# Patient Record
Sex: FEMALE | Race: WHITE | NOT HISPANIC OR LATINO | Employment: FULL TIME | URBAN - METROPOLITAN AREA
[De-identification: names, ages, dates, MRNs, and addresses within clinical notes are randomized per-mention and may not be internally consistent; named-entity substitution may affect disease eponyms.]

---

## 2017-01-07 ENCOUNTER — TELEPHONE (OUTPATIENT)
Dept: NURSING | Facility: CLINIC | Age: 62
End: 2017-01-07

## 2017-01-07 ENCOUNTER — OFFICE VISIT (OUTPATIENT)
Dept: URGENT CARE | Facility: URGENT CARE | Age: 62
End: 2017-01-07
Payer: COMMERCIAL

## 2017-01-07 VITALS
WEIGHT: 120 LBS | TEMPERATURE: 97.6 F | SYSTOLIC BLOOD PRESSURE: 120 MMHG | RESPIRATION RATE: 18 BRPM | HEIGHT: 61 IN | HEART RATE: 76 BPM | DIASTOLIC BLOOD PRESSURE: 78 MMHG | BODY MASS INDEX: 22.66 KG/M2

## 2017-01-07 DIAGNOSIS — H01.003 BLEPHARITIS OF BOTH EYES, UNSPECIFIED EYELID, UNSPECIFIED TYPE: Primary | ICD-10-CM

## 2017-01-07 DIAGNOSIS — H01.006 BLEPHARITIS OF BOTH EYES, UNSPECIFIED EYELID, UNSPECIFIED TYPE: Primary | ICD-10-CM

## 2017-01-07 PROCEDURE — 99213 OFFICE O/P EST LOW 20 MIN: CPT | Performed by: FAMILY MEDICINE

## 2017-01-07 RX ORDER — TOBRAMYCIN AND DEXAMETHASONE 3; 1 MG/ML; MG/ML
SUSPENSION/ DROPS OPHTHALMIC
Qty: 1 BOTTLE | Refills: 0 | Status: SHIPPED | OUTPATIENT
Start: 2017-01-07 | End: 2017-01-11 | Stop reason: ALTCHOICE

## 2017-01-07 NOTE — NURSING NOTE
"Chief Complaint   Patient presents with     Urgent Care     Eye Problem       Initial /78 mmHg  Pulse 76  Temp(Src) 97.6  F (36.4  C) (Oral)  Resp 18  Ht 5' 1\" (1.549 m)  Wt 120 lb (54.432 kg)  BMI 22.69 kg/m2  LMP 06/03/2010 Estimated body mass index is 22.69 kg/(m^2) as calculated from the following:    Height as of this encounter: 5' 1\" (1.549 m).    Weight as of this encounter: 120 lb (54.432 kg).  BP completed using cuff size: regular    Magda Allen  CMA      "

## 2017-01-07 NOTE — PROGRESS NOTES
"  SUBJECTIVE:                                                    Edwina Zhou is a 61 year old female who presents to clinic today for the following health issues:      Eye(s) Problem      Duration: one month on and off today has been the worst    Description:  Location: both, right is more bothersome, eyelids are itchy  Pain: YES- to the touch  Redness: YES  Discharge: YES    Accompanying signs and symptoms:     History (Trauma, foreign body exposure,): None    Precipitating or alleviating factors (contact use): None    Therapies tried and outcome: None     OBJECTIVE:  /78 mmHg  Pulse 76  Temp(Src) 97.6  F (36.4  C) (Oral)  Resp 18  Ht 5' 1\" (1.549 m)  Wt 120 lb (54.432 kg)  BMI 22.69 kg/m2  LMP 06/03/2010  Exam; bbilateral upper eyelid swelling.  Right upper lid has eczematous appearance.   Conjunctivae are normal. Remainder of eye exam was also normal    Remainder of ENT exam was within normal limits; heart and lungs were clear  Gen.; no apparent distress    ASSESSMENT:  1. Blepharitis  2. Dermatitis    PLAN:  1. tobramycin-dexamethasone (TOBRADEX) 0.3-0.1 % ophthalmic susp      "

## 2017-01-07 NOTE — TELEPHONE ENCOUNTER
"Call Type: Triage Call    Presenting Problem: \"I woke up and my eyelids are funky\".  Caller is  reporting that both eye lids are swollen and red.  Triage Note:  Guideline Title: Eye: Infection or Irritation  Recommended Disposition: See Provider within 24 hours  Original Inclination: Wanted to speak with a nurse  Override Disposition:  Intended Action: Go to Urgent Care Center  Physician Contacted: No  Generalized swelling of eyelid(s) and unresponsive to self care ?  YES  Eye injury, possible foreign body, OR chemical splash ? NO  Known herpes zoster or undiagnosed blister-like rash on or near eye or on tip of  nose ? NO  Sudden or severe eye pain ? NO  Sudden decrease in visual acuity ? NO  New onset of eye redness, irritation/foreign body sensation or gritty feeling with  yellow/green drainage ? NO  New onset of eye redness, irritation/foreign body sensation or gritty feeling with  watery or sticky mucus drainage ? NO  Worsening redness, swelling AND tenderness of tissue around eyes associated with  restricted or painful eye movements, bulging eye, or decreased vision related to  swelling ? NO  Pressure/pain above or below eyes, near ears over sinuses with associated  tenderness, swelling or redness of soft tissue. ? NO  New onset of severe sensitivity to light or glare (photophobia) associated with  eye pain, reduced vision, tearing, or discharge. ? NO  Physician Instructions:  Care Advice: Another adult should drive if vision is impaired.  Avoid touching or rubbing the eyes  wash hands frequently and do not share towels, washcloths or other personal  care items with others.  Apply a warm compress to the eye(s) four times a day for 10 to 15 minutes.  Gently brush closed eyelid(s) with a cotton ball and diluted baby shampoo  and water (3-5 drops of shampoo in 4 oz. of water) three or four times a  day.  Don't use eye makeup or wear contact lenses until there have been no  symptoms for at least 24 hours.  Discard " mascara, eye liner, and eye shadow as they can be a source of  infection.  To avoid eye infections, discard these eye makeups every 3 to 4  months. Do not share eye cosmetics.

## 2017-01-11 ENCOUNTER — TELEPHONE (OUTPATIENT)
Dept: FAMILY MEDICINE | Facility: CLINIC | Age: 62
End: 2017-01-11

## 2017-01-11 ENCOUNTER — OFFICE VISIT (OUTPATIENT)
Dept: FAMILY MEDICINE | Facility: CLINIC | Age: 62
End: 2017-01-11
Payer: COMMERCIAL

## 2017-01-11 VITALS
WEIGHT: 125 LBS | DIASTOLIC BLOOD PRESSURE: 64 MMHG | BODY MASS INDEX: 23.6 KG/M2 | OXYGEN SATURATION: 99 % | HEART RATE: 81 BPM | TEMPERATURE: 98.3 F | SYSTOLIC BLOOD PRESSURE: 104 MMHG | HEIGHT: 61 IN

## 2017-01-11 DIAGNOSIS — H01.001 BLEPHARITIS OF RIGHT UPPER EYELID, UNSPECIFIED TYPE: Primary | ICD-10-CM

## 2017-01-11 DIAGNOSIS — H01.9 DERMATITIS OF EYELID OF RIGHT EYE: ICD-10-CM

## 2017-01-11 PROCEDURE — 99213 OFFICE O/P EST LOW 20 MIN: CPT | Performed by: FAMILY MEDICINE

## 2017-01-11 RX ORDER — HYDROCORTISONE 2.5 %
CREAM (GRAM) TOPICAL
Qty: 20 G | Refills: 3 | Status: SHIPPED | OUTPATIENT
Start: 2017-01-11 | End: 2021-06-11

## 2017-01-11 NOTE — MR AVS SNAPSHOT
After Visit Summary   1/11/2017    Edwina Zhou    MRN: [id number]           Patient Information     Date Of Birth          1955        Visit Information        Provider Department      1/11/2017 2:45 PM Phuong Story MD [facility]        Today's Diagnoses     Blepharitis of right upper eyelid, unspecified type    -  1       Care Instructions    Stop the tobradex drops.  Use the hydrocortisone cream very scantly across the affected lid - up to twice daily.   Please, call or return to clinic or go to the ER immediately if signs or symptoms worsen or fail to improve as anticipated.     Ok for slight smear vaseline during the day , if needed across the eyelids to help with moisture and healing of that skin split.     Ok for cold compresses.  Oral claritin 10mg daily for the itching.  Try not to rub.                 Thank you for choosing [facility]  for your Health Care. It was a pleasure seeing you at your visit today. Please contact us with any questions or concerns you may have.                   Phuong Story MD                                  To reach your [facility] care team after hours call:   [phone]    Our clinic hours are:     Monday- 7:30 am - 7:00 pm                             Tuesday through Friday- 7:30 am - 5:00 pm                                        Saturday- 8:00 am - 12:00 pm                  Phone:  [phone]    Our pharmacy hours are:     Monday  8:00 am to 7:00 pm      Tuesday through Friday 8:00am to 6:00pm                        Saturday - 9:00 am to 1:00 pm      Sunday : Closed.              Phone:  [phone]      There is also information available at our web site:  [email or web address]    If your provider ordered any lab tests and you do not receive the results within 10 business days, please call the clinic.    If you need a medication refill please contact your pharmacy.  Please allow 2  "business days for your refill to be completed.    Our clinic offers telephone visits and e visits.  Please ask one of your team members to explain more.      Use NovaSyst (secure email communication and access to your chart) to send your primary care provider a message or make an appointment. Ask someone on your Team how to sign up for MyChart.                     Follow-ups after your visit        Who to contact     If you have questions or need follow up information about today's clinic visit or your schedule please contact [facility] directly at [phone].  Normal or non-critical lab and imaging results will be communicated to you by MyChart, letter or phone within 4 business days after the clinic has received the results. If you do not hear from us within 7 days, please contact the clinic through FatTailhart or phone. If you have a critical or abnormal lab result, we will notify you by phone as soon as possible.  Submit refill requests through Socialbomb or call your pharmacy and they will forward the refill request to us. Please allow 3 business days for your refill to be completed.          Additional Information About Your Visit        FatTailhart Information     NovaSyst gives you secure access to your electronic health record. If you see a primary care provider, you can also send messages to your care team and make appointments. If you have questions, please call your primary care clinic.  If you do not have a primary care provider, please call [phone] and they will assist you.        Care EveryWhere ID     This is your Care EveryWhere ID. This could be used by other organizations to access your [place] medical records  [id number]        Your Vitals Were     Pulse Temperature Height BMI (Body Mass Index) Pulse Oximetry Last Period    81 98.3  F (36.8  C) (Oral) 5' 1\" (1.549 m) 23.63 kg/m2 99% 06/03/2010       Blood Pressure from Last 3 Encounters:   01/11/17 104/64   01/07/17 120/78 "   06/13/16 102/62    Weight from Last 3 Encounters:   01/11/17 125 lb (56.7 kg)   01/07/17 120 lb (54.432 kg)   06/13/16 120 lb 2 oz (54.488 kg)              Today, you had the following     No orders found for display         Today's Medication Changes          These changes are accurate as of: 1/11/17  3:21 PM.  If you have any questions, ask your nurse or doctor.               Start taking these medicines.        Dose/Directions    hydrocortisone 2.5 % cream   Used for:  Blepharitis of right upper eyelid, unspecified type   Started by:  Phuong Story MD        Apply very small amount to eyelids up to twice daily   Quantity:  20 g   Refills:  3            Where to get your medicines      These medications were sent to [place] [address] [place] [address] [address]    Hours:  Tech issues with their phone system Phone:  [phone]    - hydrocortisone 2.5 % cream             Primary Care Provider Office Phone # Fax #    Phuong Story [phone]       [facility] [address] [place] [id number]        Thank you!     Thank you for choosing [facility]  for your care. Our goal is always to provide you with excellent care. Hearing back from our patients is one way we can continue to improve our services. Please take a few minutes to complete the written survey that you may receive in the mail after your visit with us. Thank you!             Your Updated Medication List - Protect others around you: Learn how to safely use, store and throw away your medicines at www.disposemymeds.org.          This list is accurate as of: 1/11/17  3:21 PM.  Always use your most recent med list.                   Brand Name Dispense Instructions for use    calcium carb 1250 mg (500 mg [place])/vitamin D 200 units 500-200 MG-UNIT per tablet    OSCAL with D    100 tablet    Take 1 tablet by mouth 2 times daily  (with meals).       hydrocortisone 2.5 % cream     20 g    Apply very small amount to eyelids up to twice daily       loratadine 10 MG tablet    CLARITIN    3 MONTHS    ONE DAILY       multivitamin per tablet     100 tablet    Take 1 tablet by mouth daily.       omega 3 1000 MG Caps     90 Cap    once daily       tobramycin-dexamethasone 0.3-0.1 % ophthalmic susp    TOBRADEX    1 Bottle    1 drop in each eye tid x 7 days       vitamin B complex with vitamin C Tabs tablet    STRESS TAB    100 tablet    Take 1 tablet by mouth daily.

## 2017-01-11 NOTE — NURSING NOTE
"Chief Complaint   Patient presents with     Eye Problem     intermittent x 1 month      Initial /64 mmHg  Pulse 81  Temp(Src) 98.3  F (36.8  C) (Oral)  Ht 5' 1\" (1.549 m)  Wt 125 lb (56.7 kg)  BMI 23.63 kg/m2  SpO2 99%  LMP 06/03/2010 Estimated body mass index is 23.63 kg/(m^2) as calculated from the following:    Height as of this encounter: 5' 1\" (1.549 m).    Weight as of this encounter: 125 lb (56.7 kg).  BP completed using cuff size regular right Arm  Rylee Auuth CMA    "
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
## 2017-01-11 NOTE — PATIENT INSTRUCTIONS
Stop the tobradex drops.  Use the hydrocortisone cream very scantly across the affected lid - up to twice daily.   Please, call or return to clinic or go to the ER immediately if signs or symptoms worsen or fail to improve as anticipated.     Ok for slight smear vaseline during the day , if needed across the eyelids to help with moisture and healing of that skin split.     Ok for cold compresses.  Oral claritin 10mg daily for the itching.  Try not to rub.                 Thank you for choosing [facility]  for your Health Care. It was a pleasure seeing you at your visit today. Please contact us with any questions or concerns you may have.                   Phuong Story MD                                  To reach your [facility] care team after hours call:   [phone]    Our clinic hours are:     Monday- 7:30 am - 7:00 pm                             Tuesday through Friday- 7:30 am - 5:00 pm                                        Saturday- 8:00 am - 12:00 pm                  Phone:  [phone]    Our pharmacy hours are:     Monday  8:00 am to 7:00 pm      Tuesday through Friday 8:00am to 6:00pm                        Saturday - 9:00 am to 1:00 pm      Sunday : Closed.              Phone:  [phone]      There is also information available at our web site:  [email or web address]    If your provider ordered any lab tests and you do not receive the results within 10 business days, please call the clinic.    If you need a medication refill please contact your pharmacy.  Please allow 2 business days for your refill to be completed.    Our clinic offers telephone visits and e visits.  Please ask one of your team members to explain more.      Use Magic Wheel[employer] (secure email communication and access to your chart) to send your primary care provider a message or make an appointment. Ask someone on your Team how to sign up for [employer].

## 2017-01-11 NOTE — TELEPHONE ENCOUNTER
[facility] note 1/7/17    Eye(s) Problem        Duration: one month on and off today has been the worst    Description:  Location: both, right is more bothersome, eyelids are itchy  Pain: YES- to the touch  Redness: YES  Discharge: YES    Accompanying signs and symptoms:      History (Trauma, foreign body exposure,): None    Precipitating or alleviating factors (contact use): None    Therapies tried and outcome: None               Now there is a bump or dermatitis on eye lid on the rt.       Appt made for today.    The patient indicates understanding of these issues and agrees with the plan.    Gris Santos RN    [facility]

## 2017-07-15 ENCOUNTER — HEALTH MAINTENANCE LETTER (OUTPATIENT)
Age: 62
End: 2017-07-15

## 2017-09-18 ENCOUNTER — OFFICE VISIT (OUTPATIENT)
Dept: FAMILY MEDICINE | Facility: CLINIC | Age: 62
End: 2017-09-18
Payer: COMMERCIAL

## 2017-09-18 VITALS
TEMPERATURE: 97.9 F | BODY MASS INDEX: 22.96 KG/M2 | WEIGHT: 121.6 LBS | HEART RATE: 57 BPM | SYSTOLIC BLOOD PRESSURE: 102 MMHG | DIASTOLIC BLOOD PRESSURE: 70 MMHG | HEIGHT: 61 IN | OXYGEN SATURATION: 100 %

## 2017-09-18 DIAGNOSIS — Z12.31 VISIT FOR SCREENING MAMMOGRAM: ICD-10-CM

## 2017-09-18 DIAGNOSIS — Z12.4 SCREENING FOR MALIGNANT NEOPLASM OF CERVIX: ICD-10-CM

## 2017-09-18 DIAGNOSIS — Z12.11 SCREEN FOR COLON CANCER: ICD-10-CM

## 2017-09-18 DIAGNOSIS — Z23 NEED FOR PROPHYLACTIC VACCINATION AND INOCULATION AGAINST INFLUENZA: ICD-10-CM

## 2017-09-18 DIAGNOSIS — K64.4 EXTERNAL HEMORRHOIDS: ICD-10-CM

## 2017-09-18 DIAGNOSIS — Z00.01 ENCOUNTER FOR ROUTINE ADULT MEDICAL EXAM WITH ABNORMAL FINDINGS: Primary | ICD-10-CM

## 2017-09-18 DIAGNOSIS — M75.81 RIGHT ROTATOR CUFF TENDONITIS: ICD-10-CM

## 2017-09-18 DIAGNOSIS — E78.5 HYPERLIPIDEMIA WITH TARGET LDL LESS THAN 130: ICD-10-CM

## 2017-09-18 DIAGNOSIS — E55.9 VITAMIN D DEFICIENCY: ICD-10-CM

## 2017-09-18 DIAGNOSIS — K90.41 GLUTEN INTOLERANCE: ICD-10-CM

## 2017-09-18 DIAGNOSIS — Z78.0 POSTMENOPAUSAL: ICD-10-CM

## 2017-09-18 PROBLEM — Z86.39 HISTORY OF OBESITY: Status: ACTIVE | Noted: 2017-09-18

## 2017-09-18 LAB
ALBUMIN SERPL-MCNC: 3.7 G/DL (ref 3.4–5)
ALP SERPL-CCNC: 50 U/L (ref 40–150)
ALT SERPL W P-5'-P-CCNC: 45 U/L (ref 0–50)
ANION GAP SERPL CALCULATED.3IONS-SCNC: 7 MMOL/L (ref 3–14)
AST SERPL W P-5'-P-CCNC: 23 U/L (ref 0–45)
BILIRUB SERPL-MCNC: 0.4 MG/DL (ref 0.2–1.3)
BUN SERPL-MCNC: 16 MG/DL (ref 7–30)
CALCIUM SERPL-MCNC: 9 MG/DL (ref 8.5–10.1)
CHLORIDE SERPL-SCNC: 107 MMOL/L (ref 94–109)
CHOLEST SERPL-MCNC: 241 MG/DL
CO2 SERPL-SCNC: 27 MMOL/L (ref 20–32)
CREAT SERPL-MCNC: 0.67 MG/DL (ref 0.52–1.04)
ERYTHROCYTE [DISTWIDTH] IN BLOOD BY AUTOMATED COUNT: 12.1 % (ref 10–15)
GFR SERPL CREATININE-BSD FRML MDRD: 90 ML/MIN/1.7M2
GLUCOSE SERPL-MCNC: 90 MG/DL (ref 70–99)
HCT VFR BLD AUTO: 40.3 % (ref 35–47)
HDLC SERPL-MCNC: 54 MG/DL
HGB BLD-MCNC: 13.7 G/DL (ref 11.7–15.7)
LDLC SERPL CALC-MCNC: 169 MG/DL
MCH RBC QN AUTO: 31.9 PG (ref 26.5–33)
MCHC RBC AUTO-ENTMCNC: 34 G/DL (ref 31.5–36.5)
MCV RBC AUTO: 94 FL (ref 78–100)
NONHDLC SERPL-MCNC: 187 MG/DL
PLATELET # BLD AUTO: 276 10E9/L (ref 150–450)
POTASSIUM SERPL-SCNC: 4.3 MMOL/L (ref 3.4–5.3)
PROT SERPL-MCNC: 7.3 G/DL (ref 6.8–8.8)
RBC # BLD AUTO: 4.29 10E12/L (ref 3.8–5.2)
SODIUM SERPL-SCNC: 141 MMOL/L (ref 133–144)
TRIGL SERPL-MCNC: 88 MG/DL
TSH SERPL DL<=0.005 MIU/L-ACNC: 2.9 MU/L (ref 0.4–4)
WBC # BLD AUTO: 6.3 10E9/L (ref 4–11)

## 2017-09-18 PROCEDURE — 82306 VITAMIN D 25 HYDROXY: CPT | Mod: 59 | Performed by: FAMILY MEDICINE

## 2017-09-18 PROCEDURE — 87624 HPV HI-RISK TYP POOLED RSLT: CPT | Performed by: FAMILY MEDICINE

## 2017-09-18 PROCEDURE — 90686 IIV4 VACC NO PRSV 0.5 ML IM: CPT | Performed by: FAMILY MEDICINE

## 2017-09-18 PROCEDURE — 80061 LIPID PANEL: CPT | Performed by: FAMILY MEDICINE

## 2017-09-18 PROCEDURE — 80053 COMPREHEN METABOLIC PANEL: CPT | Performed by: FAMILY MEDICINE

## 2017-09-18 PROCEDURE — 82306 VITAMIN D 25 HYDROXY: CPT | Performed by: FAMILY MEDICINE

## 2017-09-18 PROCEDURE — 90471 IMMUNIZATION ADMIN: CPT | Performed by: FAMILY MEDICINE

## 2017-09-18 PROCEDURE — 36415 COLL VENOUS BLD VENIPUNCTURE: CPT | Performed by: FAMILY MEDICINE

## 2017-09-18 PROCEDURE — G0145 SCR C/V CYTO,THINLAYER,RESCR: HCPCS | Performed by: FAMILY MEDICINE

## 2017-09-18 PROCEDURE — 99396 PREV VISIT EST AGE 40-64: CPT | Mod: 25 | Performed by: FAMILY MEDICINE

## 2017-09-18 PROCEDURE — 85027 COMPLETE CBC AUTOMATED: CPT | Performed by: FAMILY MEDICINE

## 2017-09-18 PROCEDURE — 99213 OFFICE O/P EST LOW 20 MIN: CPT | Mod: 25 | Performed by: FAMILY MEDICINE

## 2017-09-18 PROCEDURE — 84443 ASSAY THYROID STIM HORMONE: CPT | Performed by: FAMILY MEDICINE

## 2017-09-18 RX ORDER — ACETAMINOPHEN 160 MG
TABLET,DISINTEGRATING ORAL
Refills: 0 | COMMUNITY
Start: 2017-09-18 | End: 2021-06-11

## 2017-09-18 ASSESSMENT — PATIENT HEALTH QUESTIONNAIRE - PHQ9
5. POOR APPETITE OR OVEREATING: NOT AT ALL
SUM OF ALL RESPONSES TO PHQ QUESTIONS 1-9: 0

## 2017-09-18 ASSESSMENT — ANXIETY QUESTIONNAIRES
7. FEELING AFRAID AS IF SOMETHING AWFUL MIGHT HAPPEN: NOT AT ALL
6. BECOMING EASILY ANNOYED OR IRRITABLE: NOT AT ALL
2. NOT BEING ABLE TO STOP OR CONTROL WORRYING: NOT AT ALL
3. WORRYING TOO MUCH ABOUT DIFFERENT THINGS: NOT AT ALL
5. BEING SO RESTLESS THAT IT IS HARD TO SIT STILL: NOT AT ALL
1. FEELING NERVOUS, ANXIOUS, OR ON EDGE: NOT AT ALL
GAD7 TOTAL SCORE: 0

## 2017-09-18 NOTE — PATIENT INSTRUCTIONS
DAILY FIBER THERAPY MIX: to help stools pass more easily. - gluten-free.     citrucel 1/2 cup, 1 cup of oat bran,  and 1 cup of flax seed meal - ground - = mix it together in a container or zip-loc bag and take 1 teaspoon in 1 cup of warm water daily, , follow with 1 -8 oz of water.      Preventive Health Recommendations  Female Ages 50 - 64    Yearly exam: See your health care provider every year in order to  o Review health changes.   o Discuss preventive care.    o Review your medicines if your doctor has prescribed any.      Get a Pap test every three years (unless you have an abnormal result and your provider advises testing more often).    If you get Pap tests with HPV test, you only need to test every 5 years, unless you have an abnormal result.     You do not need a Pap test if your uterus was removed (hysterectomy) and you have not had cancer.    You should be tested each year for STDs (sexually transmitted diseases) if you're at risk.     Have a mammogram every 1 to 2 years.    Have a colonoscopy at age 50, or have a yearly FIT test (stool test). These exams screen for colon cancer.      Have a cholesterol test every 5 years, or more often if advised.    Have a diabetes test (fasting glucose) every three years. If you are at risk for diabetes, you should have this test more often.     If you are at risk for osteoporosis (brittle bone disease), think about having a bone density scan (DEXA).    Shots: Get a flu shot each year. Get a tetanus shot every 10 years.    Nutrition:     Eat at least 5 servings of fruits and vegetables each day.    Eat whole-grain bread, whole-wheat pasta and brown rice instead of white grains and rice.    Talk to your provider about Calcium and Vitamin D.     Lifestyle    Exercise at least 150 minutes a week (30 minutes a day, 5 days a week). This will help you control your weight and prevent disease.    Limit alcohol to one drink per day.    No smoking.     Wear sunscreen to  prevent skin cancer.     See your dentist every six months for an exam and cleaning.    See your eye doctor every 1 to 2 years.                                       Rotator Cuff Injury   What is a rotator cuff injury?   A rotator cuff injury is a strain or tear in the group of tendons and muscles that hold your shoulder joint together and help move your shoulder.   How does it occur?   A rotator cuff injury may result from:     using your arm to break a fall     falling onto your arm     lifting a heavy object     use of your shoulder in sports with a repetitive overhead movement, such as swimming, baseball (mainly pitchers), football, and tennis, which gradually strains the tendon     manual labor such as painting, plastering, raking leaves, or housework   What are the symptoms?   The symptoms of a torn rotator cuff are:     arm and shoulder pain     shoulder weakness     shoulder tenderness     loss of shoulder movement, especially overhead   How is it diagnosed?   Your healthcare provider will examine you and check your shoulder for pain, tenderness, and loss of motion as you move your arm in all directions. Your provider will ask if your shoulder pain began suddenly or gradually. You may have an X-ray to make sure there are not any fractures or bone spurs.   Based on these results, you may have other tests or procedures right away or later, such as:     magnetic resonance imaging (MRI), which creates images of your shoulder and surrounding structures with sound waves     an arthrogram, which is an X-ray or MRI that is taken after a special dye has been injected into your shoulder joint to outline its soft structures     arthroscopy, a surgical procedure in which a small instrument is inserted into your shoulder joint so your provider can look directly at your rotator cuff   What is the treatment?   A tendon in your shoulder can be inflamed, partially torn, or completely torn. What is done about it depends on  how torn it is and how much it hurts.   If your tear is a minor one, it can be left to heal by itself if it does not interfere with your everyday activities. Your treatment plan should include:     proper sitting posture, in which your head and shoulders are balanced     rest for your shoulder, which means avoiding strenuous activity or any overhead motion that causes pain     ice packs at least once a day, and preferably 2 or 3 times a day     doing the exercises your healthcare provider gives you     anti-inflammatory drugs. Adults aged 65 years and older should not take non-steroidal anti-inflammatory medicine for more than 7 days without their healthcare provider's approval.     physical therapy to strengthen your shoulder as it heals   If you have a bad tear, you may need to have it repaired by arthroscopy. Arthroscopy can be used to perform surgery on a joint as well as to see inside the joint. The rough edges of a torn tendon can be trimmed and left to heal. Larger tears can be stitched back together. After surgery, your treatment plan will include physical therapy to strengthen your shoulder as it heals.   How long will the effects last?   Full recovery depends on what is torn and how it is treated.   When can I return to my normal activities?   Everyone recovers from an injury at a different rate. Return to your activities will be determined by how soon your shoulder recovers, not by how many days or weeks it has been since your injury has occurred. In general, the longer you have symptoms before you start treatment, the longer it will take to get better. The goal of rehabilitation is to return you to your normal activities as soon as is safely possible. If you return too soon you may worsen your injury.   You may safely return to your normal activities when:     Your injured shoulder has full range of motion without pain.     Your injured shoulder has regained normal strength compared to the uninjured  shoulder.   What can be done to help prevent this from recurring?   The best way to prevent a recurrence is to strengthen your shoulder muscles and keep them in peak condition with shoulder exercises.           Rotator Cuff Strain Rehabilitation Exercises                  You may do all of these exercises right away.   Isometric shoulder external rotation:  a doorway with your elbow bent 90 degrees and the back of the wrist on your injured side pressed against the door frame. Try to press your hand outward into the door frame. Hold for 5 seconds. Do 3 sets of 10.   Isometric shoulder internal rotation:  a doorway with your elbow bent 90 degrees and the front of the wrist on your injured side pressed against the door frame. Try to press your palm into the door frame. Hold for 5 seconds. Do 3 sets of 10.   Wand exercise: Flexion: Stand upright and hold a stick in both hands, palms down. Stretch your arms by lifting them over your head, keeping your arms straight. Hold for 5 seconds and return to the starting position. Repeat 10 times.   Wand exercise: Extension: Stand upright and hold a stick in both hands behind your back. Move the stick away from your back. Hold the end position for 5 seconds. Relax and return to the starting position. Repeat 10 times.   Wand exercise: External rotation: Lie on your back and hold a stick in both hands, palms up. Your upper arms should be resting on the floor with your elbows at your sides and bent 90 degrees. Use your uninjured arm to push your injured arm out away from your body. Keep the elbow of your injured arm at your side while it is being pushed. Hold the stretch for 5 seconds. Repeat 10 times.   Wand exercise: Shoulder abduction and adduction: Stand and hold a stick with both hands, palms facing away from your body. Rest the stick against the front of your thighs. Use your uninjured arm to push your injured arm out to the side and up as high as possible. Keep  your arms straight. Hold for 5 seconds. Repeat 10 times.   Resisted shoulder external rotation: Stand sideways next to a door with your injured arm farther from the door. Tie a knot in the end of the tubing and shut the knot in the door at waist level. Hold the other end of the tubing with the hand of your injured arm. Rest the hand of your injured arm across your stomach. Keeping your elbow in at your side, rotate your arm outward and away from your waist. Make sure you keep your elbow bent 90 degrees and your forearm parallel to the floor. Repeat 10 times. Build up to 3 sets of 10.   Resisted shoulder internal rotation: Stand sideways next to a door with your injured arm closest to the door. Tie a knot in the end of the tubing and shut the knot in the door at waist level. Hold the other end of the tubing with the hand of your injured arm. Bend the elbow of your injured arm 90 degrees. Keeping your elbow in at your side, rotate your forearm across your body and then back to the starting position. Make sure you keep your forearm parallel to the floor. Do 3 sets of 10.   Scaption: Stand with your arms at your sides and with your elbows straight. Slowly raise your arms to eye level. As you raise your arms, spread them apart so that they are only slightly in front of your body (at about a 30-degree angle to the front of your body). Point your thumbs toward the ceiling. Hold for 2 seconds and lower your arms slowly. Do 3 sets of 10. Progress to holding a soup can or light weight when you are doing the exercise and increase the weight as the exercise gets easier.   Side-lying external rotation: Lie on your uninjured side with your injured arm at your side and your elbow bent 90 degrees. Keeping your elbow against your side, raise your forearm toward the ceiling and hold for 2 seconds. Slowly lower your arm. Do 3 sets of 10. You can start doing this exercise holding a soup can or light weight and gradually increase the  "weight as long as there is no pain.   Horizontal abduction: Lie on your stomach on a table or the edge of a bed with the arm on your injured side hanging down over the edge. Raise your arm out to the side, with your thumb pointed toward the ceiling, until your arm is parallel to the floor. Hold for 2 seconds and then lower it slowly. Start this exercise with no weight. As you get stronger, add a light weight or hold a soup can. Do 3 sets of 10.   Push-up with a plus: Begin on the floor on your hands and knees. Keep your arms a shoulder width apart and lift your feet off the floor. Arch your back as high as possible and round your shoulders (this is the \"plus\" part or the exercise). Bend your elbows and lower your body to the floor. Return to the starting position and arch your back again. Do 3 sets of 10.   Published by [employer].  This content is reviewed periodically and is subject to change as new health information becomes available. The information is intended to inform and educate and is not a replacement for medical evaluation, advice, diagnosis or treatment by a healthcare professional.   Written by Consuelo Craft, MS, PT, and Madelin Valdez, PT, [facility], [facility], for [employer].   ? 2010 [facility] and/or its affiliates. All Rights Reserved.   Copyright   [employer] 2011  Adult Health Advisor                         Thank you for choosing [facility]  for your Health Care. It was a pleasure seeing you at your visit today. Please contact us with any questions or concerns you may have.                   Phuong Story MD                                  To reach your [facility] care team after hours call:   [phone]    Our clinic hours are:     Monday- 7:30 am - 7:00 pm                             Tuesday through Friday- 7:30 am - 5:00 pm                                        Saturday- 8:00 am - 12:00 pm                  Phone:  [phone]    Our " pharmacy hours are:     Monday  8:00 am to 7:00 pm      Tuesday through Friday 8:00am to 6:00pm                        Saturday - 9:00 am to 1:00 pm      Sunday : Closed.              Phone:  [phone]      There is also information available at our web site:  [email or web address]    If your provider ordered any lab tests and you do not receive the results within 10 business days, please call the clinic.    If you need a medication refill please contact your pharmacy.  Please allow 2 business days for your refill to be completed.    Our clinic offers telephone visits and e visits.  Please ask one of your team members to explain more.      Use [employer] (secure email communication and access to your chart) to send your primary care provider a message or make an appointment. Ask someone on your Team how to sign up for [employer].

## 2017-09-18 NOTE — NURSING NOTE
"Chief Complaint   Patient presents with     Physical       Initial /70 (BP Location: Left arm, Patient Position: Chair, Cuff Size: Adult Regular)  Pulse 57  Temp 97.9  F (36.6  C) (Oral)  Ht 5' 1.25\" (1.556 m)  Wt 121 lb 9.6 oz (55.2 kg)  LMP 06/03/2010  SpO2 100%  BMI 22.79 kg/m2 Estimated body mass index is 22.79 kg/(m^2) as calculated from the following:    Height as of this encounter: 5' 1.25\" (1.556 m).    Weight as of this encounter: 121 lb 9.6 oz (55.2 kg).  Medication Reconciliation: complete   Mayra Alvarez CMA  "

## 2017-09-18 NOTE — NURSING NOTE
Injectable Influenza Immunization Documentation    1.  Are you sick today? (Fever of 100.5 or higher on the day of the clinic)   No    2.  Have you ever had Guillain-[place] Syndrome within 6 weeks of an influenza vaccionation?  No    3. Do you have a life-threatening allergy to eggs?  No    4. Do you have a life-threatening allergy to a component of the vaccine? May include antibiotics, gelatin or latex.  No     5. Have you ever had a reaction to a dose of flu vaccine that needed immediate medical attention?  No     Form completed by Mayra Alvarez CMA
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
## 2017-09-18 NOTE — MR AVS SNAPSHOT
After Visit Summary   9/18/2017    Edwina Zhou    MRN: [id number]           Patient Information     Date Of Birth          1955        Visit Information        Provider Department      9/18/2017 9:00 AM Phuong Story MD [facility]        Today's Diagnoses     Encounter for routine adult medical exam with abnormal findings    -  1    Right rotator cuff tendonitis        External hemorrhoids        Vitamin D deficiency- mild         Postmenopausal        Hyperlipidemia with target LDL less than 130        Screen for colon cancer        Visit for screening mammogram        Screening for malignant neoplasm of cervix        Need for prophylactic vaccination and inoculation against influenza        Gluten intolerance- went gluten-free 3/2014           Care Instructions       DAILY FIBER THERAPY MIX: to help stools pass more easily. - gluten-free.     citrucel 1/2 cup, 1 cup of oat bran,  and 1 cup of flax seed meal - ground - = mix it together in a container or zip-loc bag and take 1 teaspoon in 1 cup of warm water daily, , follow with 1 -8 oz of water.      Preventive Health Recommendations  Female Ages 50 - 64    Yearly exam: See your health care provider every year in order to  o Review health changes.   o Discuss preventive care.    o Review your medicines if your doctor has prescribed any.      Get a Pap test every three years (unless you have an abnormal result and your provider advises testing more often).    If you get Pap tests with HPV test, you only need to test every 5 years, unless you have an abnormal result.     You do not need a Pap test if your uterus was removed (hysterectomy) and you have not had cancer.    You should be tested each year for STDs (sexually transmitted diseases) if you're at risk.     Have a mammogram every 1 to 2 years.    Have a colonoscopy at age 50, or have a yearly FIT test (stool test). These exams screen for colon cancer.      Have a  cholesterol test every 5 years, or more often if advised.    Have a diabetes test (fasting glucose) every three years. If you are at risk for diabetes, you should have this test more often.     If you are at risk for osteoporosis (brittle bone disease), think about having a bone density scan (DEXA).    Shots: Get a flu shot each year. Get a tetanus shot every 10 years.    Nutrition:     Eat at least 5 servings of fruits and vegetables each day.    Eat whole-grain bread, whole-wheat pasta and brown rice instead of white grains and rice.    Talk to your provider about Calcium and Vitamin D.     Lifestyle    Exercise at least 150 minutes a week (30 minutes a day, 5 days a week). This will help you control your weight and prevent disease.    Limit alcohol to one drink per day.    No smoking.     Wear sunscreen to prevent skin cancer.     See your dentist every six months for an exam and cleaning.    See your eye doctor every 1 to 2 years.                                       Rotator Cuff Injury   What is a rotator cuff injury?   A rotator cuff injury is a strain or tear in the group of tendons and muscles that hold your shoulder joint together and help move your shoulder.   How does it occur?   A rotator cuff injury may result from:     using your arm to break a fall     falling onto your arm     lifting a heavy object     use of your shoulder in sports with a repetitive overhead movement, such as swimming, baseball (mainly pitchers), football, and tennis, which gradually strains the tendon     manual labor such as painting, plastering, raking leaves, or housework   What are the symptoms?   The symptoms of a torn rotator cuff are:     arm and shoulder pain     shoulder weakness     shoulder tenderness     loss of shoulder movement, especially overhead   How is it diagnosed?   Your healthcare provider will examine you and check your shoulder for pain, tenderness, and loss of motion as you move your arm in all  directions. Your provider will ask if your shoulder pain began suddenly or gradually. You may have an X-ray to make sure there are not any fractures or bone spurs.   Based on these results, you may have other tests or procedures right away or later, such as:     magnetic resonance imaging (MRI), which creates images of your shoulder and surrounding structures with sound waves     an arthrogram, which is an X-ray or MRI that is taken after a special dye has been injected into your shoulder joint to outline its soft structures     arthroscopy, a surgical procedure in which a small instrument is inserted into your shoulder joint so your provider can look directly at your rotator cuff   What is the treatment?   A tendon in your shoulder can be inflamed, partially torn, or completely torn. What is done about it depends on how torn it is and how much it hurts.   If your tear is a minor one, it can be left to heal by itself if it does not interfere with your everyday activities. Your treatment plan should include:     proper sitting posture, in which your head and shoulders are balanced     rest for your shoulder, which means avoiding strenuous activity or any overhead motion that causes pain     ice packs at least once a day, and preferably 2 or 3 times a day     doing the exercises your healthcare provider gives you     anti-inflammatory drugs. Adults aged 65 years and older should not take non-steroidal anti-inflammatory medicine for more than 7 days without their healthcare provider's approval.     physical therapy to strengthen your shoulder as it heals   If you have a bad tear, you may need to have it repaired by arthroscopy. Arthroscopy can be used to perform surgery on a joint as well as to see inside the joint. The rough edges of a torn tendon can be trimmed and left to heal. Larger tears can be stitched back together. After surgery, your treatment plan will include physical therapy to strengthen your shoulder as  it heals.   How long will the effects last?   Full recovery depends on what is torn and how it is treated.   When can I return to my normal activities?   Everyone recovers from an injury at a different rate. Return to your activities will be determined by how soon your shoulder recovers, not by how many days or weeks it has been since your injury has occurred. In general, the longer you have symptoms before you start treatment, the longer it will take to get better. The goal of rehabilitation is to return you to your normal activities as soon as is safely possible. If you return too soon you may worsen your injury.   You may safely return to your normal activities when:     Your injured shoulder has full range of motion without pain.     Your injured shoulder has regained normal strength compared to the uninjured shoulder.   What can be done to help prevent this from recurring?   The best way to prevent a recurrence is to strengthen your shoulder muscles and keep them in peak condition with shoulder exercises.           Rotator Cuff Strain Rehabilitation Exercises                  You may do all of these exercises right away.   Isometric shoulder external rotation:  a doorway with your elbow bent 90 degrees and the back of the wrist on your injured side pressed against the door frame. Try to press your hand outward into the door frame. Hold for 5 seconds. Do 3 sets of 10.   Isometric shoulder internal rotation:  a doorway with your elbow bent 90 degrees and the front of the wrist on your injured side pressed against the door frame. Try to press your palm into the door frame. Hold for 5 seconds. Do 3 sets of 10.   Wand exercise: Flexion: Stand upright and hold a stick in both hands, palms down. Stretch your arms by lifting them over your head, keeping your arms straight. Hold for 5 seconds and return to the starting position. Repeat 10 times.   Wand exercise: Extension: Stand upright and hold a stick  in both hands behind your back. Move the stick away from your back. Hold the end position for 5 seconds. Relax and return to the starting position. Repeat 10 times.   Wand exercise: External rotation: Lie on your back and hold a stick in both hands, palms up. Your upper arms should be resting on the floor with your elbows at your sides and bent 90 degrees. Use your uninjured arm to push your injured arm out away from your body. Keep the elbow of your injured arm at your side while it is being pushed. Hold the stretch for 5 seconds. Repeat 10 times.   Wand exercise: Shoulder abduction and adduction: Stand and hold a stick with both hands, palms facing away from your body. Rest the stick against the front of your thighs. Use your uninjured arm to push your injured arm out to the side and up as high as possible. Keep your arms straight. Hold for 5 seconds. Repeat 10 times.   Resisted shoulder external rotation: Stand sideways next to a door with your injured arm farther from the door. Tie a knot in the end of the tubing and shut the knot in the door at waist level. Hold the other end of the tubing with the hand of your injured arm. Rest the hand of your injured arm across your stomach. Keeping your elbow in at your side, rotate your arm outward and away from your waist. Make sure you keep your elbow bent 90 degrees and your forearm parallel to the floor. Repeat 10 times. Build up to 3 sets of 10.   Resisted shoulder internal rotation: Stand sideways next to a door with your injured arm closest to the door. Tie a knot in the end of the tubing and shut the knot in the door at waist level. Hold the other end of the tubing with the hand of your injured arm. Bend the elbow of your injured arm 90 degrees. Keeping your elbow in at your side, rotate your forearm across your body and then back to the starting position. Make sure you keep your forearm parallel to the floor. Do 3 sets of 10.   Scaption: Stand with your arms at  "your sides and with your elbows straight. Slowly raise your arms to eye level. As you raise your arms, spread them apart so that they are only slightly in front of your body (at about a 30-degree angle to the front of your body). Point your thumbs toward the ceiling. Hold for 2 seconds and lower your arms slowly. Do 3 sets of 10. Progress to holding a soup can or light weight when you are doing the exercise and increase the weight as the exercise gets easier.   Side-lying external rotation: Lie on your uninjured side with your injured arm at your side and your elbow bent 90 degrees. Keeping your elbow against your side, raise your forearm toward the ceiling and hold for 2 seconds. Slowly lower your arm. Do 3 sets of 10. You can start doing this exercise holding a soup can or light weight and gradually increase the weight as long as there is no pain.   Horizontal abduction: Lie on your stomach on a table or the edge of a bed with the arm on your injured side hanging down over the edge. Raise your arm out to the side, with your thumb pointed toward the ceiling, until your arm is parallel to the floor. Hold for 2 seconds and then lower it slowly. Start this exercise with no weight. As you get stronger, add a light weight or hold a soup can. Do 3 sets of 10.   Push-up with a plus: Begin on the floor on your hands and knees. Keep your arms a shoulder width apart and lift your feet off the floor. Arch your back as high as possible and round your shoulders (this is the \"plus\" part or the exercise). Bend your elbows and lower your body to the floor. Return to the starting position and arch your back again. Do 3 sets of 10.   Published by [employer].  This content is reviewed periodically and is subject to change as new health information becomes available. The information is intended to inform and educate and is not a replacement for medical evaluation, advice, diagnosis or treatment by a healthcare professional.   Written " by Consuelo Craft, MS, PT, and Madelin Valdez, PT, [facility], OCS, for [facility].   ? 2010 [facility] and/or its affiliates. All Rights Reserved.   Copyright   [employer] 2011  Adult Health Advisor                         Thank you for choosing [facility]  for your Health Care. It was a pleasure seeing you at your visit today. Please contact us with any questions or concerns you may have.                   Phuong Story MD                                  To reach your [facility] care team after hours call:   [phone]    Our clinic hours are:     Monday- 7:30 am - 7:00 pm                             Tuesday through Friday- 7:30 am - 5:00 pm                                        Saturday- 8:00 am - 12:00 pm                  Phone:  [phone]    Our pharmacy hours are:     Monday  8:00 am to 7:00 pm      Tuesday through Friday 8:00am to 6:00pm                        Saturday - 9:00 am to 1:00 pm      Sunday : Closed.              Phone:  [phone]      There is also information available at our web site:  [email or web address]    If your provider ordered any lab tests and you do not receive the results within 10 business days, please call the clinic.    If you need a medication refill please contact your pharmacy.  Please allow 2 business days for your refill to be completed.    Our clinic offers telephone visits and e visits.  Please ask one of your team members to explain more.      Use [employer] (secure email communication and access to your chart) to send your primary care provider a message or make an appointment. Ask someone on your Team how to sign up for [employer].                         Follow-ups after your visit        Additional Services     COLORECTAL SURGERY REFERRAL       Your provider has referred you to: FMANDREA: [facility] [phone]   [email or web address]  [facility]: Colon and  Rectal [facility] [phone]   [email or web address]    Referral Reason(s): Hemorrhoids  Special Concerns: None  This referral is: Elective (week +)  It is OK to leave a message on patient's voicemail.    Please be aware that coverage of these services is subject to the terms and limitations of your health insurance plan.  Call member services at your health plan with any benefit or coverage questions.      Please bring the following with you to your appointment:    (1) Any X-Rays, CTs or MRIs which have been performed.  Contact the facility where they were done to arrange for  prior to your scheduled appointment.    (2) List of current medications  (3) This referral request   (4) Any documents/labs given to you for this referral            GASTROENTEROLOGY ADULT REF PROCEDURE ONLY       Last Lab Result: Creatinine (mg/dL)       Date                     Value                 06/13/2016               0.68             ----------  Body mass index is 22.79 kg/(m^2).      Patient will be contacted to schedule procedure.     Please be aware that coverage of these services is subject to the terms and limitations of your health insurance plan.  Call member services at your health plan with any benefit or coverage questions.  Any procedures must be performed at a [place] facility OR coordinated by your clinic's referral office.    Please bring the following with you to your appointment:    (1) Any X-Rays, CTs or MRIs which have been performed.  Contact the facility where they were done to arrange for  prior to your scheduled appointment.    (2) List of current medications   (3) This referral request   (4) Any documents/labs given to you for this referral                  Your next 10 appointments already scheduled     Oct 03, 2017  3:00 PM CDT   MAMMOGRAM with RVMA1   [facility] ([facility]    [address]  [facility] [id number] [phone]           Do not wear any body powder, lotions, deodorant or perfume the day of the exam. Bring a list of all medications, especially hormones.  If your last mammogram was not done at [place], please bring your mammogram films. We will need the name of your MD/PA to send a copy of your report.              Future tests that were ordered for you today     Open Future Orders        Priority Expected Expires Ordered    MA SCREENING DIGITAL BILAT - Future  (s+30) Routine  9/18/2018 9/18/2017    GASTROENTEROLOGY ADULT REF PROCEDURE ONLY Routine 12/12/2017 9/18/2018 9/18/2017            Who to contact     If you have questions or need follow up information about today's clinic visit or your schedule please contact [facility] directly at [phone].  Normal or non-critical lab and imaging results will be communicated to you by MyChart, letter or phone within 4 business days after the clinic has received the results. If you do not hear from us within 7 days, please contact the clinic through [employer] or phone. If you have a critical or abnormal lab result, we will notify you by phone as soon as possible.  Submit refill requests through [employer] or call your pharmacy and they will forward the refill request to us. Please allow 3 business days for your refill to be completed.          Additional Information About Your Visit        MyChart Information     [employer] gives you secure access to your electronic health record. If you see a primary care provider, you can also send messages to your care team and make appointments. If you have questions, please call your primary care clinic.  If you do not have a primary care provider, please call [phone] and they will assist you.        Care EveryWhere ID     This is your Care EveryWhere ID. This could be used by other organizations to access your [place] medical records  [id number]        Your  "Vitals Were     Pulse Temperature Height Last Period Pulse Oximetry BMI (Body Mass Index)    57 97.9  F (36.6  C) (Oral) 5' 1.25\" (1.556 m) 06/03/2010 100% 22.79 kg/m2       Blood Pressure from Last 3 Encounters:   09/18/17 102/70   01/11/17 104/64   01/07/17 120/78    Weight from Last 3 Encounters:   09/18/17 121 lb 9.6 oz (55.2 kg)   01/11/17 125 lb (56.7 kg)   01/07/17 120 lb (54.4 kg)              We Performed the Following          ADMIN VACCINE, FIRST [93243]     25 Hydroxyvitamin D2 and D3     CBC with platelets     COLORECTAL SURGERY REFERRAL     Comprehensive metabolic panel     HC FLU VAC PRESRV FREE QUAD SPLIT VIR 3+YRS IM  [11583]     HPV High Risk Types DNA Cervical     LIPID REFLEX TO DIRECT LDL PANEL     OFFICE/OUTPT VISIT,EST,LEVL III     Pap imaged thin layer screen with HPV - recommended age 30 - 65 years (select HPV order below)     TSH with free T4 reflex     Vitamin D Deficiency          Today's Medication Changes          These changes are accurate as of: 9/18/17 10:44 AM.  If you have any questions, ask your nurse or doctor.               Start taking these medicines.        Dose/Directions    vitamin D3 2000 UNITS Caps   Used for:  Vitamin D deficiency   Started by:  Phuong Story MD        1 capsule daily - takes only intermittently   Refills:  0         These medicines have changed or have updated prescriptions.        Dose/Directions    hydrocortisone 2.5 % cream   This may have changed:    - when to take this  - reasons to take this  - additional instructions   Used for:  Blepharitis of right upper eyelid, unspecified type        Apply very small amount to eyelids up to twice daily   Quantity:  20 g   Refills:  3         Stop taking these medicines if you haven't already. Please contact your care team if you have questions.     vitamin B complex with vitamin C Tabs tablet   Commonly known as:  STRESS TAB   Stopped by:  Phuong Story MD                Where to get your " medicines      Some of these will need a paper prescription and others can be bought over the counter.  Ask your nurse if you have questions.     You don't need a prescription for these medications     vitamin D3 2000 UNITS Caps                Primary Care Provider Office Phone # Fax #    Phuong Story [phone] [address] [facility] [address]        Equal Access to Services     FILI MCKEON : Hadii aad ku hadasho Soomaali, waaxda luqadaha, qaybta kaalmada adeegyada, waxay idiin hayaan adeeg khdavidsh la'sanjeevn . So [facility] [phone].    ATENCIÓN: Si habla español, tiene a ruiz disposición servicios gratuitos de asistencia lingüística. Llame al [phone].    We comply with applicable federal civil rights laws and [place] laws. We do not discriminate on the basis of race, color, national origin, age, disability sex, sexual orientation or gender identity.            Thank you!     Thank you for choosing [facility]  for your care. Our goal is always to provide you with excellent care. Hearing back from our patients is one way we can continue to improve our services. Please take a few minutes to complete the written survey that you may receive in the mail after your visit with us. Thank you!             Your Updated Medication List - Protect others around you: Learn how to safely use, store and throw away your medicines at www.disposemymeds.org.          This list is accurate as of: 9/18/17 10:44 AM.  Always use your most recent med list.                   Brand Name Dispense Instructions for use Diagnosis    calcium carb 1250 mg (500 mg [place])/vitamin D 200 units 500-200 MG-UNIT per tablet    OSCAL with D    100 tablet    Take 1 tablet by mouth 2 times daily (with meals).    Routine general medical examination at a health care facility       hydrocortisone 2.5 % cream     20 g    Apply very small amount to eyelids up to twice daily    Blepharitis of right  upper eyelid, unspecified type       loratadine 10 MG tablet    CLARITIN    3 MONTHS    ONE DAILY        multivitamin per tablet     100 tablet    Take 1 tablet by mouth daily.    Routine general medical examination at a health care facility       omega 3 1000 MG Caps     90 Cap    once daily    Urticaria, Hives       vitamin D3 2000 UNITS Caps      1 capsule daily - takes only intermittently    Vitamin D deficiency

## 2017-09-19 LAB
DEPRECATED CALCIDIOL+CALCIFEROL SERPL-MC: 71 UG/L (ref 20–75)
DEPRECATED CALCIDIOL+CALCIFEROL SERPL-MC: <75 UG/L (ref 20–75)
VITAMIN D2 SERPL-MCNC: <5 UG/L
VITAMIN D3 SERPL-MCNC: 70 UG/L

## 2017-09-19 ASSESSMENT — ANXIETY QUESTIONNAIRES: GAD7 TOTAL SCORE: 0

## 2017-09-21 LAB
COPATH REPORT: NORMAL
PAP: NORMAL

## 2017-09-22 LAB
FINAL DIAGNOSIS: NORMAL
HPV HR 12 DNA CVX QL NAA+PROBE: NEGATIVE
HPV16 DNA SPEC QL NAA+PROBE: NEGATIVE
HPV18 DNA SPEC QL NAA+PROBE: NEGATIVE
SPECIMEN DESCRIPTION: NORMAL

## 2017-10-03 DIAGNOSIS — Z12.31 VISIT FOR SCREENING MAMMOGRAM: ICD-10-CM

## 2017-10-03 PROCEDURE — G0202 SCR MAMMO BI INCL CAD: HCPCS | Mod: TC

## 2017-10-30 ENCOUNTER — OFFICE VISIT (OUTPATIENT)
Dept: OBGYN | Facility: CLINIC | Age: 62
End: 2017-10-30
Payer: COMMERCIAL

## 2017-10-30 ENCOUNTER — TELEPHONE (OUTPATIENT)
Dept: FAMILY MEDICINE | Facility: CLINIC | Age: 62
End: 2017-10-30

## 2017-10-30 VITALS
HEART RATE: 60 BPM | DIASTOLIC BLOOD PRESSURE: 72 MMHG | SYSTOLIC BLOOD PRESSURE: 120 MMHG | BODY MASS INDEX: 25.11 KG/M2 | WEIGHT: 134 LBS

## 2017-10-30 DIAGNOSIS — N81.11 MIDLINE CYSTOCELE: Primary | ICD-10-CM

## 2017-10-30 PROCEDURE — 99203 OFFICE O/P NEW LOW 30 MIN: CPT | Performed by: OBSTETRICS & GYNECOLOGY

## 2017-10-30 NOTE — NURSING NOTE
"Chief Complaint   Patient presents with     Uterine Prolapse     possible uterine prolapse - noticed this last night       Initial /72 (BP Location: Left arm, Patient Position: Chair, Cuff Size: Adult Regular)  Pulse 60  Wt 134 lb (60.8 kg)  LMP 06/03/2010  BMI 25.11 kg/m2 Estimated body mass index is 25.11 kg/(m^2) as calculated from the following:    Height as of 9/18/17: 5' 1.25\" (1.556 m).    Weight as of this encounter: 134 lb (60.8 kg).  Medication Reconciliation: complete     Nurse assisted visit.  Karena Peoples MA.      "

## 2017-10-30 NOTE — MR AVS SNAPSHOT
After Visit Summary   10/30/2017    Edwina Zhou    MRN: [id number]           Patient Information     Date Of Birth          1955        Visit Information        Provider Department      10/30/2017 9:30 AM Anthony Elmore MD [facility]        Today's Diagnoses     Midline cystocele    -  1       Follow-ups after your visit        Who to contact     If you have questions or need follow up information about today's clinic visit or your schedule please contact [facility] directly at [phone].  Normal or non-critical lab and imaging results will be communicated to you by MyChart, letter or phone within 4 business days after the clinic has received the results. If you do not hear from us within 7 days, please contact the clinic through [employer] or phone. If you have a critical or abnormal lab result, we will notify you by phone as soon as possible.  Submit refill requests through [employer] or call your pharmacy and they will forward the refill request to us. Please allow 3 business days for your refill to be completed.          Additional Information About Your Visit        MyChart Information     [employer] gives you secure access to your electronic health record. If you see a primary care provider, you can also send messages to your care team and make appointments. If you have questions, please call your primary care clinic.  If you do not have a primary care provider, please call [phone] and they will assist you.        Care EveryWhere ID     This is your Care EveryWhere ID. This could be used by other organizations to access your [place] medical records  [id number]        Your Vitals Were     Pulse Last Period BMI (Body Mass Index)             60 06/03/2010 25.11 kg/m2          Blood Pressure from Last 3 Encounters:   10/30/17 120/72   09/18/17 102/70   01/11/17 104/64    Weight from Last 3 Encounters:   10/30/17 134 lb (60.8 kg)   09/18/17 121 lb 9.6 oz  (55.2 kg)   01/11/17 125 lb (56.7 kg)              Today, you had the following     No orders found for display         Today's Medication Changes          These changes are accurate as of: 10/30/17 11:12 AM.  If you have any questions, ask your nurse or doctor.               These medicines have changed or have updated prescriptions.        Dose/Directions    hydrocortisone 2.5 % cream   This may have changed:    - when to take this  - reasons to take this  - additional instructions   Used for:  Blepharitis of right upper eyelid, unspecified type        Apply very small amount to eyelids up to twice daily   Quantity:  20 g   Refills:  3                Primary Care Provider Office Phone # Fax #    Phuong Story [phone]       [address] [place] [id number]        Equal Access to Services     FILI MCKEON : Endy matias Sobib, wajim luqadaha, qaybta kaalmada jade, papito jaramillo . So [facility] [phone].    ATENCIÓN: Si habla español, tiene a ruiz disposición servicios gratuitos de asistencia lingüística. Llame al [phone].    We comply with applicable federal civil rights laws and [place] laws. We do not discriminate on the basis of race, color, national origin, age, disability, sex, sexual orientation, or gender identity.            Thank you!     Thank you for choosing [facility]  for your care. Our goal is always to provide you with excellent care. Hearing back from our patients is one way we can continue to improve our services. Please take a few minutes to complete the written survey that you may receive in the mail after your visit with us. Thank you!             Your Updated Medication List - Protect others around you: Learn how to safely use, store and throw away your medicines at www.disposemymeds.org.          This list is accurate as of: 10/30/17 11:12 AM.  Always use your most recent med list.                    Brand Name Dispense Instructions for use Diagnosis    calcium carb 1250 mg (500 mg [place])/vitamin D 200 units 500-200 MG-UNIT per tablet    OSCAL with D    100 tablet    Take 1 tablet by mouth 2 times daily (with meals).    Routine general medical examination at a health care facility       hydrocortisone 2.5 % cream     20 g    Apply very small amount to eyelids up to twice daily    Blepharitis of right upper eyelid, unspecified type       loratadine 10 MG tablet    CLARITIN    3 MONTHS    ONE DAILY        multivitamin per tablet     100 tablet    Take 1 tablet by mouth daily.    Routine general medical examination at a health care facility       omega 3 1000 MG Caps     90 Cap    once daily    Urticaria, Hives       vitamin D3 2000 UNITS Caps      1 capsule daily - takes only intermittently    Vitamin D deficiency

## 2017-10-30 NOTE — TELEPHONE ENCOUNTER
I called the patient and per JV advised her to call GYN.  Number for [facility] given  Hallie Rogers RN- Triage [employer]

## 2017-10-30 NOTE — PROGRESS NOTES
"  SUBJECTIVE:  Edwina Zhou is an 62 year old  P:2 postmenopausal woman who presents for evaluation of 'something protruding from vagina'    Noted this last PM      -able to push back in vagina  Has had two children; both born vaginally  Not on HRT or using vaginal estrogen  Has not had pelvic surgery      Past Medical History:   Diagnosis Date     Allergic rhinitis, cause unspecified      Arthritis      Dysphagia     had esophagram  - tertiary contractions of esoph with some retention, EGD showed gastritis, duodenitis and esophagitis ,pt didn't start a PPI       Fatigue      H/O YISEL I     s/p culpo with bx x 2 and cryotherapy - ok for paps q3yrs starting      Helicobacter pylori (H. pylori) infection in      Hyperlipidemia LDL goal < 130 2005    lipitor \"attacked my muscles\" (Hx fibromyalgia); [id number] failed to lower lipids enough&=myalgias also , lovastatin 20mg = myalgias      Lyme disease 2010     Myalgia and myositis, unspecified     fibromyalgia - doing an otc human growth  hormone      OBESITY NOS- hx of      lost 30+ pounds since  breast reduction      Obesity- worsening fibromyalgia, back pain, pcos  2009     Polycystic ovaries      Postmenopausal since age 52      Shingles        Family History   Problem Relation Age of Onset     Adopted: Yes     Family History Negative Other      pt is adopted - no knowledge of family hx      Unknown/Adopted Mother      Unknown/Adopted Father        Past Surgical History:   Procedure Laterality Date     COSMETIC SURGERY       SURGICAL HISTORY OF -       COLPO WITH BX X2 + CRYO     SURGICAL HISTORY OF -       WISDOM TEETH-SUBSEQ MINDY FXR=ORIF     SURGICAL HISTORY OF -       breast reduction - [facility]      TONSILLECTOMY  at age 32       Current Outpatient Prescriptions   Medication     Cholecalciferol (VITAMIN D3) 2000 UNITS CAPS     calcium carb 1250 mg, 500 mg [place],/vitamin D 200 units (OSCAL WITH D) " 500-200 MG-UNIT per tablet     Multiple Vitamin (MULTIVITAMIN) per tablet     OMEGA 3 1000 MG PO CAPS     LORATADINE 10 MG OR TABS     hydrocortisone 2.5 % cream     No current facility-administered medications for this visit.      Allergies   Allergen Reactions     Atorvastatin      Muscle cramps with lipitor      Cephalexin      Started acyclovir and keflex together.  Unsure if drug reaction or viral exanthem.        Social History   Substance Use Topics     Smoking status: Never Smoker     Smokeless tobacco: Never Used     Alcohol use Yes      Comment: 2-3 glasses of wine weekly       ROS:  C: NEGATIVE for fever, chills  E: NEGATIVE for vision changes   R: NEGATIVE for significant cough or SOB  CV: NEGATIVE for chest pain, palpitations   GI: NEGATIVE for nausea, abdominal pain, heartburn, or change in bowel habits  : loss of urine with cough or sneeze  M: NEGATIVE for significant arthralgias or myalgia  N: NEGATIVE for weakness, dizziness or paresthesias or headache  NOSE; NEGATIVE  BREASTS: NEGATIVE    OBJECTIVE:  Exam:  GENERAL APPEARANCE: healthy, alert and no distress  ABDOMEN:  soft, nontender, no HSM or masses and bowel sounds normal    Pelvic Exam:  Vulva: No external lesions, normal hair distribution, no adenopathy  Vagina: atrophic     Large defect anterior compartment (cystocele)     Minimal posterior wall defect     Uterine descensus  Uterus: Normal size, anteverted, non-tender, mobile   Ovaries: No mass, non-tender, mobile  Rectal verge; negative    ASSESSMENT/PLAN:  POP; cystocele and uterine descensus  Stress urinary loss      30 minutes spent more than 50 % in face to face counseling re: POP, interventions (Kegel exercise/estrogen) pessary      -and surgery    Health Maintenance   Topic Date Due     FIT Q1 YR  09/17/2015     COLONOSCOPY Q10 YR  12/11/2017     WELLNESS VISIT Q1 YR  09/18/2018     MAMMO Q1 YR  10/03/2018     ADVANCE DIRECTIVE PLANNING Q5 YRS  07/30/2019     PAP Q3 YR  09/18/2020      LIPID MONITORING Q5 YEARS  09/18/2022     TETANUS Q10 YR  03/27/2023     INFLUENZA VACCINE (SYSTEM ASSIGNED)  Completed     HEPATITIS C SCREENING  Completed

## 2017-10-30 NOTE — TELEPHONE ENCOUNTER
Reason for Call:  Same Day Appointment, Requested Provider:  Phuong Story MD    PCP: Phuong Story    Reason for visit: The patient wants to get in to see Dr. Story today for a prolapse in the vaginal area. She says she can see it protruding out.    Duration of symptoms: She just noticed it yesterday.    Have you been treated for this in the past? No    Can we leave a detailed message on this number? YES    Phone number patient can be reached at: Cell number on file:    Telephone Information:   Mobile [phone]     Best Time: Anytime    Call taken on 10/30/2017 at 7:43 AM by Merry Johnston

## 2017-11-07 ENCOUNTER — TELEPHONE (OUTPATIENT)
Dept: OBGYN | Facility: CLINIC | Age: 62
End: 2017-11-07

## 2017-11-07 NOTE — TELEPHONE ENCOUNTER
Patient was scheduled with Ayala in [place] 11/17/17 at 930AM. Sent to triage for questions regarding prolapsed uterus.

## 2017-11-07 NOTE — TELEPHONE ENCOUNTER
Patient saw Dr. Elmore regarding prolapsed bladder and per patient Dr. Elmore recommended that patient scheduled a consult with Dr. Cai about surgery. There are no appointments available this month and patient would like to see Dr. Cai this month. Please contact patient regarding schedule.    Outreach ,  Malissa Neff

## 2017-11-17 ENCOUNTER — OFFICE VISIT (OUTPATIENT)
Dept: OBGYN | Facility: CLINIC | Age: 62
End: 2017-11-17
Payer: COMMERCIAL

## 2017-11-17 VITALS — WEIGHT: 133 LBS | BODY MASS INDEX: 24.93 KG/M2 | SYSTOLIC BLOOD PRESSURE: 118 MMHG | DIASTOLIC BLOOD PRESSURE: 72 MMHG

## 2017-11-17 DIAGNOSIS — N81.11 CYSTOCELE, MIDLINE: Primary | ICD-10-CM

## 2017-11-17 DIAGNOSIS — N39.3 FEMALE STRESS INCONTINENCE: ICD-10-CM

## 2017-11-17 DIAGNOSIS — N81.4 UTERINE PROLAPSE: ICD-10-CM

## 2017-11-17 PROCEDURE — 99215 OFFICE O/P EST HI 40 MIN: CPT | Performed by: OBSTETRICS & GYNECOLOGY

## 2017-11-17 NOTE — NURSING NOTE
"Chief Complaint   Patient presents with     Consult       Initial /72  Wt 133 lb (60.3 kg)  LMP 2010  BMI 24.93 kg/m2 Estimated body mass index is 24.93 kg/(m^2) as calculated from the following:    Height as of 17: 5' 1.25\" (1.556 m).    Weight as of this encounter: 133 lb (60.3 kg).  BP completed using cuff size: regular        The following HM Due: NONE      The following patient reported/Care Every where data was sent to:  P ABSTRACT QUALITY INITIATIVES [[id number]]       Shraddha Coburn [facility]                "

## 2017-11-17 NOTE — PROGRESS NOTES
"The patient is a 62 year old White female , the largest 7 lbs. 9 oz., Postmenopausal at age 52 for contraception, Not on HRT whom I am asked to see by Dr. Elmore for evaluation of Pelvic organ prolapse with urinary incontinence.The patient states that in October 2017 while in the shower she noticed something bulging from the vagina presented to Dr. Elmore  for evaluation And a \" large cystocele and uterine descensus\" was noted.  The patient says that for the last couple of years she's had leakage of urine with laughing lifting coughing and straining but does require her to wear constant protection Pregnancy history includes Vaginal delivery ×3 her first child was smaller than her second but the patient pushed hard for 2 hours and states it was a difficult delivery, She denies instrumentation, No known history of bladder or bowel injury. Patient complains of urinary leakage with the above activities. The patient also notices urgency frequency with occasional leakage The patient states that she is very active and wants to remain active and this is become an interference in her lifestyle. The patient also states that she has trouble swallowing has had an extensive GI workup. It is felt the etiology may be related to fibromyalgia. The patient also notices significant reflux. At present she uses an H2 blocker on an interm. We had a lengthy discussion regarding reflux today    Asthma: Denies  Smoking: Never  Recurrent UTIs: Denies  Hematuria: Denies  Diabetes: Denies  Related medication usage:   Current Outpatient Prescriptions   Medication     Cholecalciferol (VITAMIN D3) 2000 UNITS CAPS     hydrocortisone 2.5 % cream     calcium carb 1250 mg, 500 mg [place],/vitamin D 200 units (OSCAL WITH D) 500-200 MG-UNIT per tablet     Multiple Vitamin (MULTIVITAMIN) per tablet     OMEGA 3 1000 MG PO CAPS     LORATADINE 10 MG OR TABS     No current facility-administered medications for this visit.        Incontinence of " "stool or flatus: States she can wait gas and has rectal urgency without any incontinence of stool  Symptoms of pelvic relaxation/prolapse: First noted October 30, 2017  Voiding or defacatory dysfunction: Does feel that she has incomplete emptying. She notices frequency and oftentimes will wake more urine when standing after voiding  Previous evaluation: No  Kegel exercises: Did not help  Nocturia: 3-4 times per night  Fluids: 2 cans of either diet Mountain Dew or diet Dr. Pepper daily, denies coffee. Occasionally has caffeinated tea, no alcohol, will have 8-10 water bottles daily  Urgency: Yes with leakage    Past Medical History:   Diagnosis Date     Allergic rhinitis, cause unspecified      Arthritis      Dysphagia     had esophagram 12/07 - tertiary contractions of esoph with some retention, EGD showed gastritis, duodenitis and esophagitis ,pt didn't start a PPI       Fatigue      H/O YISEL I     s/p culpo with bx x 2 and cryotherapy - ok for paps q3yrs starting 2012     Helicobacter pylori (H. pylori) infection in 2009     Hyperlipidemia LDL goal < 130 2/4/2005    lipitor \"attacked my muscles\" (Hx fibromyalgia); [id number] failed to lower lipids enough&=myalgias also , lovastatin 20mg = myalgias      Lyme disease 8/20/2010     Myalgia and myositis, unspecified     fibromyalgia - doing an otc human growth  hormone      OBESITY NOS- hx of      lost 30+ pounds since 2002 breast reduction      Obesity- worsening fibromyalgia, back pain, pcos  12/1/2009     Polycystic ovaries      Postmenopausal since age 52      Shingles        Social History     Social History     Marital status:      Spouse name: Mauro Healy)      Number of children: 2     Years of education: 18     Occupational History     adult ministry -[employer]     Social History Main Topics     Smoking status: Never Smoker     Smokeless tobacco: Never Used     Alcohol use Yes      " Comment: 2-3 glasses of wine weekly     Drug use: No     Sexual activity: Yes     Partners: Male     Birth control/ protection: Post-menopausal      Comment:  to Niraj Zhou      Other Topics Concern      Service No     Blood Transfusions No     Caffeine Concern Yes     diet soda and tea occasionally      Exercise Yes     doing [place] Weight loss - horse back riding, [place] track, hiking      Seat Belt Yes     always      Self-Exams Yes     SBE encouraged monthly      Parent/Sibling W/ Cabg, Mi Or Angioplasty Before 65f 55m? No     Social History Narrative    ** Merged History Encounter **         calcium - nothing right now, increase 1000-1200mg po daily     flex sig/colonoscopy - pt is adopted - do screening     sun precautions - discussed     mammogram - yearly     Td booster - can't recall - will give today 2/4/05    pneumovax - at age 62     DEXA - when postmenopausal     stool hemoccults - every year after age 40    ASA- consider 81mg asa daily     mulvitamin - encouraged    taking herbals - human growth hormone sl spray - for fibromyalgia, bifida bacteria.     Menses: getting more irregular the last 2 years.                Constitutional: healthy, alert and no distress  Head: Normocephalic. No masses, lesions, tenderness or abnormalities  Neck: Neck supple. No adenopathy. Thyroid symmetric, normal size,, Carotids without bruits.  ENT: NEGATIVE for ear, mouth and throat problems    Cardiovascular: negative, PMI normal. No lifts, heaves, or thrills. RRR. No murmurs, clicks gallops or rub  Respiratory: negative, Percussion normal. Good diaphragmatic excursion. Lungs clear  Gastrointestinal: Abdomen soft, non-tender. BS normal. No masses, organomegaly  Genitourinary: Normal external genitalia without lesions and Greater than 30  of UV angle hypermobility. Small amount of leakage with cough and strain. Grade 2 cystocele, no paravaginal defect noted. Grade 1 uterine descensus. Cystocele improved with  restoration of apical compartment support. Speculum multiparous appearing cervix no lesions, bimanual exam the uterus is parous firm mobile, adnexa without masses or enlargement, rectovaginal exam normal anal wink, normal sphincter tone, positive hemorrhoidal tissue, Minimal rectocele  Musculoskeletalextremities normal- no gross deformities noted, gait normal and normal muscle tone  Integument: no suspicious lesions or rashes  Neurologic: Gait normal. Reflexes normal and symmetric. Sensation grossly WNL.  Psychiatric: mentation appears normal and affect normal/bright  Hematologic/Lymphatic/Immunologic: Normal cervical lymph nodes    (N81.11) Cystocele, midline  (primary encounter diagnosis)  Comment: Risks, benefits, and alternative modes of therapy discussed at length. Pathophysiology of the disease process reviewed, all of the patients questions answered and informed consent obtained.  At this point the patient declines a wait-and-see conservative approach and a pessary does not continue to appear to be a good option. She is leaning more towards surgical intervention. We discussed robotic supracervical hysterectomy with BSO and sacrocolpopexy. I discussed doing a urodynamic study prior to any surgical intervention. I gave the patient extensive patient education information on these topics as well as a detailed outline of our discussion today  Plan: Patient will Urodynamic study  With appropriate therapy to follow    (N39.3) Female stress incontinence  Comment: as above  Plan: As above    (N81.4) Uterine prolapse  Comment: Above  Plan: As above    60 minutes greater than 50% in counseling

## 2017-11-17 NOTE — PATIENT INSTRUCTIONS
You can reach your [place] Care Team any time of the day by calling [phone]. This number will put you in touch with the 24 hour nurse line if the clinic is closed.    To contact your OB/GYN Surgery Scheduler please call [phone]. This is a direct number for your care team between 8 a.m. and 4 p.m. Monday through Friday.    [facility] is open for your convenience: [phone]  Monday through Friday 8 a.m. to 8:30 p.m.  Saturday 9 a.m. to 6 p.m.  Sunday Noon to 6 p.m.    They are closed on all major holidays.

## 2017-11-17 NOTE — MR AVS SNAPSHOT
After Visit Summary   11/17/2017    Edwina Zhou    MRN: [id number]           Patient Information     Date Of Birth          1955        Visit Information        Provider Department      11/17/2017 9:30 AM North Cai MD [facility]        Today's Diagnoses     Cystocele, midline    -  1    Female stress incontinence        Uterine prolapse          Care Instructions    You can reach your [place] Care Team any time of the day by calling [phone]. This number will put you in touch with the 24 hour nurse line if the clinic is closed.    To contact your OB/GYN Surgery Scheduler please call [phone]. This is a direct number for your care team between 8 a.m. and 4 p.m. Monday through Friday.    [facility] is open for your convenience: [phone]  Monday through Friday 8 a.m. to 8:30 p.m.  Saturday 9 a.m. to 6 p.m.  Sunday Noon to 6 p.m.    They are closed on all major holidays.            Follow-ups after your visit        Your next 10 appointments already scheduled     Dec 07, 2017  9:45 AM CST   [place] Urodynamic Procedure with North Cai MD, [facility] PROCEDURE OB/GYN   [facility] ([facility]    [address] [place] [address] [phone]           Please arrive at the appointment with a full bladder.              Who to contact     If you have questions or need follow up information about today's clinic visit or your schedule please contact [facility][1] directly at [phone].  Normal or non-critical lab and imaging results will be communicated to you by MyChart, letter or phone within 4 business days after the clinic has received the results. If you do not hear from us within 7 days, please contact the clinic through MyChart or phone. If you have a critical or abnormal lab result, we will notify you by phone as soon as possible.  Submit refill requests through  [employer] or call your pharmacy and they will forward the refill request to us. Please allow 3 business days for your refill to be completed.          Additional Information About Your Visit        [employer] Information     [employer] gives you secure access to your electronic health record. If you see a primary care provider, you can also send messages to your care team and make appointments. If you have questions, please call your primary care clinic.  If you do not have a primary care provider, please call [phone] and they will assist you.        Care EveryWhere ID     This is your Care EveryWhere ID. This could be used by other organizations to access your [place] medical records  [id number]        Your Vitals Were     Last Period BMI (Body Mass Index)                06/03/2010 24.93 kg/m2           Blood Pressure from Last 3 Encounters:   11/17/17 118/72   10/30/17 120/72   09/18/17 102/70    Weight from Last 3 Encounters:   11/17/17 133 lb (60.3 kg)   10/30/17 134 lb (60.8 kg)   09/18/17 121 lb 9.6 oz (55.2 kg)              Today, you had the following     No orders found for display         Today's Medication Changes          These changes are accurate as of: 11/17/17 12:06 PM.  If you have any questions, ask your nurse or doctor.               These medicines have changed or have updated prescriptions.        Dose/Directions    hydrocortisone 2.5 % cream   This may have changed:    - when to take this  - reasons to take this  - additional instructions   Used for:  Blepharitis of right upper eyelid, unspecified type        Apply very small amount to eyelids up to twice daily   Quantity:  20 g   Refills:  3                Primary Care Provider Office Phone # Fax #    Phuong Story [phone] [facility] [address]        Equal Access to Services     FILI MCKEON : therese Dixon qaybta kaalmada adeegyada, waxay idiin hayaan  joel astorgadavidsmita jang'aan ah. So [facility] [phone].    ATENCIÓN: Si bertha bermudez, tiene a ruiz disposición servicios gratuitos de asistencia lingüística. Rebecca cano [phone].    We comply with applicable federal civil rights laws and [place] laws. We do not discriminate on the basis of race, color, national origin, age, disability, sex, sexual orientation, or gender identity.            Thank you!     Thank you for choosing [facility]  for your care. Our goal is always to provide you with excellent care. Hearing back from our patients is one way we can continue to improve our services. Please take a few minutes to complete the written survey that you may receive in the mail after your visit with us. Thank you!             Your Updated Medication List - Protect others around you: Learn how to safely use, store and throw away your medicines at www.disposemymeds.org.          This list is accurate as of: 11/17/17 12:06 PM.  Always use your most recent med list.                   Brand Name Dispense Instructions for use Diagnosis    Calcium carb-Vitamin D 500 mg [place]-200 units 500-200 MG-UNIT per tablet    OSCAL with D;Oyster Shell Calcium    100 tablet    Take 1 tablet by mouth 2 times daily (with meals).    Routine general medical examination at a health care facility       hydrocortisone 2.5 % cream     20 g    Apply very small amount to eyelids up to twice daily    Blepharitis of right upper eyelid, unspecified type       loratadine 10 MG tablet    CLARITIN    3 MONTHS    ONE DAILY        multivitamin per tablet     100 tablet    Take 1 tablet by mouth daily.    Routine general medical examination at a health care facility       omega 3 1000 MG Caps     90 Cap    once daily    Urticaria, Hives       vitamin D3 2000 UNITS Caps      1 capsule daily - takes only intermittently    Vitamin D deficiency

## 2017-12-04 ENCOUNTER — TELEPHONE (OUTPATIENT)
Dept: OBGYN | Facility: CLINIC | Age: 62
End: 2017-12-04

## 2017-12-04 NOTE — TELEPHONE ENCOUNTER
Pt is calling because she is concerned about her urodynamics appt being cx for this Thursday.     Pt does not want to wait until 1/2018. She was hoping to have surgery in January.     Please advise.       SKY Remy RN

## 2017-12-07 NOTE — TELEPHONE ENCOUNTER
----- Message from North Cai MD sent at 12/7/2017  2:43 PM CST -----  Please schedule pt in 12/17  Gokul Coburn can help with this  Thanks  North Cai M.D.

## 2017-12-11 NOTE — TELEPHONE ENCOUNTER
----- Message from North Cai MD sent at 12/11/2017  4:25 PM CST -----  My note was to schedule the patient in December 2017 not December 17, 2017.  Gokul Coburn can help schedule this.Please work the patient into my schedule this month  Thank you

## 2018-01-04 ENCOUNTER — TELEPHONE (OUTPATIENT)
Dept: OBGYN | Facility: CLINIC | Age: 63
End: 2018-01-04

## 2018-01-04 ENCOUNTER — ALLIED HEALTH/NURSE VISIT (OUTPATIENT)
Dept: OBGYN | Facility: CLINIC | Age: 63
End: 2018-01-04
Payer: COMMERCIAL

## 2018-01-04 VITALS — BODY MASS INDEX: 24.93 KG/M2 | SYSTOLIC BLOOD PRESSURE: 122 MMHG | DIASTOLIC BLOOD PRESSURE: 68 MMHG | WEIGHT: 133 LBS

## 2018-01-04 DIAGNOSIS — N81.11 CYSTOCELE, MIDLINE: ICD-10-CM

## 2018-01-04 DIAGNOSIS — N39.46 MIXED INCONTINENCE URGE AND STRESS (MALE)(FEMALE): Primary | ICD-10-CM

## 2018-01-04 DIAGNOSIS — N81.4 CYSTOCELE WITH UTERINE DESCENSUS: ICD-10-CM

## 2018-01-04 LAB
ALBUMIN UR-MCNC: NEGATIVE MG/DL
APPEARANCE UR: CLEAR
BILIRUB UR QL STRIP: NEGATIVE
COLOR UR AUTO: YELLOW
GLUCOSE UR STRIP-MCNC: NEGATIVE MG/DL
HGB UR QL STRIP: NEGATIVE
KETONES UR STRIP-MCNC: NEGATIVE MG/DL
LEUKOCYTE ESTERASE UR QL STRIP: NEGATIVE
NITRATE UR QL: NEGATIVE
PH UR STRIP: 5.5 PH (ref 5–7)
SOURCE: NORMAL
SP GR UR STRIP: <=1.005 (ref 1–1.03)
UROBILINOGEN UR STRIP-ACNC: 0.2 EU/DL (ref 0.2–1)

## 2018-01-04 PROCEDURE — 51741 ELECTRO-UROFLOWMETRY FIRST: CPT | Performed by: OBSTETRICS & GYNECOLOGY

## 2018-01-04 PROCEDURE — 81003 URINALYSIS AUTO W/O SCOPE: CPT | Performed by: OBSTETRICS & GYNECOLOGY

## 2018-01-04 PROCEDURE — 51784 ANAL/URINARY MUSCLE STUDY: CPT | Performed by: OBSTETRICS & GYNECOLOGY

## 2018-01-04 PROCEDURE — 51797 INTRAABDOMINAL PRESSURE TEST: CPT | Performed by: OBSTETRICS & GYNECOLOGY

## 2018-01-04 PROCEDURE — 51729 CYSTOMETROGRAM W/VP&UP: CPT | Performed by: OBSTETRICS & GYNECOLOGY

## 2018-01-04 RX ORDER — CIPROFLOXACIN 250 MG/1
250 TABLET, FILM COATED ORAL 2 TIMES DAILY
Qty: 6 TABLET | Refills: 0 | Status: SHIPPED | OUTPATIENT
Start: 2018-01-04 | End: 2018-02-06

## 2018-01-04 NOTE — PATIENT INSTRUCTIONS
You can reach your [place] Care Team any time of the day by calling [phone]. This number will put you in touch with the 24 hour nurse line if the clinic is closed.    To contact your OB/GYN Surgery Scheduler please call [phone]. This is a direct number for your care team between 8 a.m. and 4 p.m. Monday through Friday.    [facility] is open for your convenience: [phone]  Monday through Friday 8 a.m. to 8:30 p.m.  Saturday 9 a.m. to 6 p.m.  Sunday Noon to 6 p.m.    They are closed on all major holidays.

## 2018-01-04 NOTE — PROGRESS NOTES
"The patient is a 62 year old White female , the largest 7 lbs. 9 oz., Postmenopausal at age 52 for contraception, Not on HRT whom I am asked to see by Dr. Elmore for evaluation of Pelvic organ prolapse with urinary incontinence.The patient states that in October 2017 while in the shower she noticed something bulging from the vagina presented to Dr. Elmore  for evaluation And a \" large cystocele and uterine descensus\" was noted.  The patient says that for the last couple of years she's had leakage of urine with laughing lifting coughing and straining but does require her to wear constant protection Pregnancy history includes Vaginal delivery ×3 her first child was smaller than her second but the patient pushed hard for 2 hours and states it was a difficult delivery, She denies instrumentation, No known history of bladder or bowel injury. Patient complains of urinary leakage with the above activities. The patient also notices urgency frequency with occasional leakage The patient states that she is very active and wants to remain active and this is become an interference in her lifestyle. The patient also states that she has trouble swallowing has had an extensive GI workup. It is felt the etiology may be related to fibromyalgia. The patient also notices significant reflux. At present she uses an H2 blocker on an interm. We had a lengthy discussion regarding reflux today     Asthma: Denies  Smoking: Never  Recurrent UTIs: Denies  Hematuria: Denies  Diabetes: Denies  Related medication usage:       Current Outpatient Prescriptions   Medication     Cholecalciferol (VITAMIN D3) 2000 UNITS CAPS     hydrocortisone 2.5 % cream     calcium carb 1250 mg, 500 mg [place],/vitamin D 200 units (OSCAL WITH D) 500-200 MG-UNIT per tablet     Multiple Vitamin (MULTIVITAMIN) per tablet     OMEGA 3 1000 MG PO CAPS     LORATADINE 10 MG OR TABS      No current facility-administered medications for this visit.          Incontinence " of stool or flatus: States she can wait gas and has rectal urgency without any incontinence of stool  Symptoms of pelvic relaxation/prolapse: First noted October 30, 2017  Voiding or defacatory dysfunction: Does feel that she has incomplete emptying. She notices frequency and oftentimes will wake more urine when standing after voiding  Previous evaluation: No  Kegel exercises: Did not help  Nocturia: 3-4 times per night  Fluids: 2 cans of either diet Mountain Dew or diet Dr. Pepper daily, denies coffee. Occasionally has caffeinated tea, no alcohol, will have 8-10 water bottles daily  Urgency: Yes with leakage   ROS: 10 point ROS neg other than the symptoms noted above in the HPI.  Past Surgical History:   Procedure Laterality Date     COSMETIC SURGERY       SURGICAL HISTORY OF -   1990's    COLPO WITH BX X2 + CRYO     SURGICAL HISTORY OF -       WISDOM TEETH-SUBSEQ MINDY FXR=ORIF     SURGICAL HISTORY OF -   2002    breast reduction - [facility]      TONSILLECTOMY  at age 32       /68  Wt 133 lb (60.3 kg)  LMP 06/03/2010  BMI 24.93 kg/m2  Constitutional: healthy, alert and no distress  Genitourinary: Normal external genitalia without lesions and Greater than 30  of UV angle hypermobility. Small amount of leakage with cough and strain. Grade 2 cystocele, no paravaginal defect noted. Grade 1 uterine descensus. Cystocele improved with restoration of apical compartment support. Speculum multiparous appearing cervix no lesions, bimanual exam the uterus is parous firm mobile, adnexa without masses or enlargement, rectovaginal exam normal anal wink, normal sphincter tone, positive hemorrhoidal tissue, Minimal rectocele    Urodynamic Summary:    Uroflow:        Voided Volume-543 cc.  Max Flow Rate-21.5 cc/sec.  Avg. Flow Rate-10.22 cc/sec.  Post Void Residual-70 cc.        1st sensation-43 cc.  1st desire-74 cc  Strong  Desire-340 cc  Bladder capacity-685 cc, this was evaluated the patient came into the  office with prior to the uroflow study      Pdet-less than 10.  No evidence of detrussor instability.  Normal bladder compliance    Max UCP-70.  VLPP-greater than 100.  No evidence of intrinsic sphincter deficiency.  There was incontinence of urine with cough and valsalva that has resolved with correction of this defect at 325 cc.    Assess:      Somewhat generous bladder capacity      Otherwise normal uroflow.      I did not find any evidence of voiding dysfunction or obstruction with adequate emptying.       There is evidence of pelvic floor relaxation with a symptomatic hypermobile urethrovesical angle with urinary stress incontinence that resolves with correction of the defect.  There is also a grade 2-3 midline cystocele with mild uterine descent that resolves with correction of the apical compartment defect.  There is a grade 1 distal rectocele the patient also experiences.  Urinary urgency with occasional urge incontinence.  We discussed the Retopubic tension free vaginal tape sling.  We reviewed the risks, benefits, and alternative modes of treatment, the success rates, issues of retention, as well as pre and post op cares and restrictions.  All of her questions have been answered and informed consent has been obtained.  The findings of pelvic floor relaxation including Cystocele, Uterine Descent and Rectocele were discussed at length as were the risks, benefits, and alternative modes of treatment.  We discussed a conservative wait-and-see approach, the use of a pessary, conventional repairs, the use of biologics, and mesh augmentation, as well as robotic supracervical hysterectomy, BSO with sacral colpopexy, the success rates, issues of erosion, the FDA bulletin on the use of mesh for pelvic floor disorders, post op pain, dysparunea, voiding or defacatory dysfunction as well as injury to other organs, bleeding or infection as well as pre-and postop instructions the patient has given us a tremendous amount  of clot and would like to proceed with surgical therapy at her earliest convenience.   I think the patient has a good understanding. She has been given patient education information, all of her questions have been answered and informed consent obtained..  She will use a preop bowel prep with magnesium citrate.  She will see Dr. Granda  for preop H&P for detailed written outline as well as patient education information was given to the patient today          Plan:     Recommend robotic supracervical hysterectomy, BSO, sacral colpopexy retropubic TVT sling with the advantage fit device and cystoscopy         post urodynamic testing care sheet and an Rx for antibiotic prophylaxis      given           ASSESSMENT:/PLAN:  (N39.46) Mixed incontinence urge and stress  (female)  (primary encounter diagnosis)  Comment: Urinalysis and culture sent today  Plan: *UA reflex to Microscopic and Culture ([place]         and [facility] (except [place] and         [place]), ciprofloxacin (CIPRO) 250 MG tablet        Antibiotic prophylaxis given per protocol      North Cai M.D.    (Chart documentation was completed, in part, with [employer] voice-recognition software. Even though reviewed, some grammatical, spelling, and word errors may remain

## 2018-01-04 NOTE — NURSING NOTE
"Chief Complaint   Patient presents with     Urodynamic Study       Initial /68  Wt 133 lb (60.3 kg)  LMP 2010  BMI 24.93 kg/m2 Estimated body mass index is 24.93 kg/(m^2) as calculated from the following:    Height as of 17: 5' 1.25\" (1.556 m).    Weight as of this encounter: 133 lb (60.3 kg).  BP completed using cuff size: regular        The following HM Due: NONE      Shraddha Coburn, [facility]            "

## 2018-01-04 NOTE — Clinical Note
Surgeon:MICHAEL PATE Assist:  Yes Location: [facility] Date/time preference:  Patient convenience  Surgery: Robotic supracervical hysterectomy, BSO, sacral colpopexy, retropubic TVT sling with advantage fit and cystoscopy Length of Surgery: 4-1/2 hours Diagnosis: Symptomatic pelvic floor relaxation and urinary stress incontinence urge incontinence Anesthesia type:  GENERAL  Special instructions / equipment: Per protocol Am admit or same day: SAME DAY Bowel prep: Yes Pre op: PCP Office visit with surgeon prior to surgery: Yes oh

## 2018-01-04 NOTE — TELEPHONE ENCOUNTER
Surgeon:MICHAEL PATE   Assist:  Yes   Location: [facility]   Date/time preference:  Patient convenience     Surgery: Robotic supracervical hysterectomy, BSO, sacral colpopexy, retropubic TVT sling with advantage fit and cystoscopy   Length of Surgery: 4-1/2 hours   Diagnosis: Symptomatic pelvic floor relaxation and urinary stress incontinence urge incontinence   Anesthesia type:  GENERAL     Special instructions / equipment: Per protocol   Am admit or same day: SAME DAY   Bowel prep: Yes   Pre op: PCP   Office visit with surgeon prior to surgery: Yes oh

## 2018-01-04 NOTE — Clinical Note
North Cai M.D. FACOG [address] [address] [phone] phone [phone] fax    [facility],        Thank you for the opportunity to see your patient. Please see my office visit notes for your review.  As always, I appreciate the opportunity to participate in your patient's care.   Sincerely yours,  North Cai M.D.

## 2018-01-05 NOTE — TELEPHONE ENCOUNTER
Surgery:  ROBOTIC SUPRACERVICAL HYSTERECTOMY, BSO, SACRAL COLPOPEXY, RETROPUBIC TVT SLING WITH ADVANTAGE FIT AND CYSTOSCOPY  Date:  2/13/2018  Time:  7:45 AM  Hospital:  [facility]    Patient advised of the following:  The hospital will contact you 24-48 hours prior to surgery to discuss any pre op instructions.  Contact your insurance company to see if a prior authorization or second opinion is needed.  Please schedule a pre op appointment with your primary physician within 7 days of surgery.  Please schedule an office visit with Dr. Cai 1-2 weeks prior to surgery.   No aspirin or Ibuprofen 10 days prior to surgery.  Please make arrangements to have someone drive you home from the hospital.  Please follow enclosed bowel prep instructions the day before surgery.     Surgery specific and hospital information mailed to patient.    Information was placed on the surgery calendar in [place].

## 2018-02-06 ENCOUNTER — OFFICE VISIT (OUTPATIENT)
Dept: OBGYN | Facility: CLINIC | Age: 63
End: 2018-02-06
Payer: COMMERCIAL

## 2018-02-06 VITALS
BODY MASS INDEX: 25.76 KG/M2 | HEIGHT: 62 IN | SYSTOLIC BLOOD PRESSURE: 120 MMHG | WEIGHT: 140 LBS | DIASTOLIC BLOOD PRESSURE: 60 MMHG | HEART RATE: 64 BPM

## 2018-02-06 DIAGNOSIS — N81.4 CYSTOCELE WITH UTERINE DESCENSUS: Primary | ICD-10-CM

## 2018-02-06 PROCEDURE — 99213 OFFICE O/P EST LOW 20 MIN: CPT | Performed by: OBSTETRICS & GYNECOLOGY

## 2018-02-06 RX ORDER — CALCIUM CARBONATE 500(1250)
2 TABLET ORAL DAILY
COMMUNITY
End: 2018-12-05

## 2018-02-06 NOTE — MR AVS SNAPSHOT
After Visit Summary   2/6/2018    Edwina Zhou    MRN: [id number]           Patient Information     Date Of Birth          1955        Visit Information        Provider Department      2/6/2018 4:30 PM North Cai MD [facility]        Today's Diagnoses     Cystocele with uterine descensus    -  1      Care Instructions    You can reach your [place] Care Team any time of the day by calling [phone]. This number will put you in touch with the 24 hour nurse line if the clinic is closed.    To contact your OB/GYN Station Coordinator/Surgery Scheduler please call [phone]. This is a direct number for your care team between 8 a.m. and 4 p.m. Monday through Friday.    [facility] is open for your convenience:  Monday through Friday 8 a.m. to 6 p.m.  Closed weekends and all major holidays.            Follow-ups after your visit        Your next 10 appointments already scheduled     Feb 08, 2018 10:15 AM CST   [facility] physical with Phuong Story [facility]   [facility]    [address] [place] [address]   [phone]            Feb 13, 2018   Procedure with North Cai MD   [facility] (--)    [address]  [facility] [address]   [phone]            Feb 27, 2018  1:30 PM CST   SHORT with North Cai MD   [facility] ([facility])    [address]  [facility] [id number] [phone]              Who to contact     If you have questions or need follow up information about today's clinic visit or your schedule please contact [facility] directly at [phone].  Normal or non-critical lab and imaging results will be communicated to you by MyChart, letter or phone within 4 business days after the clinic has received the results. If you do not hear from us within 7 days, please contact the clinic  "through [employer] or phone. If you have a critical or abnormal lab result, we will notify you by phone as soon as possible.  Submit refill requests through [employer] or call your pharmacy and they will forward the refill request to us. Please allow 3 business days for your refill to be completed.          Additional Information About Your Visit        [employer] Information     [employer] gives you secure access to your electronic health record. If you see a primary care provider, you can also send messages to your care team and make appointments. If you have questions, please call your primary care clinic.  If you do not have a primary care provider, please call [phone] and they will assist you.        Care EveryWhere ID     This is your Care EveryWhere ID. This could be used by other organizations to access your [place] medical records  [id number]        Your Vitals Were     Pulse Height Last Period Breastfeeding? BMI (Body Mass Index)       64 5' 2\" (1.575 m) 06/03/2010 No 25.61 kg/m2        Blood Pressure from Last 3 Encounters:   02/06/18 120/60   01/04/18 122/68   11/17/17 118/72    Weight from Last 3 Encounters:   02/06/18 140 lb (63.5 kg)   01/04/18 133 lb (60.3 kg)   02/06/18 140 lb (63.5 kg)              Today, you had the following     No orders found for display         Today's Medication Changes          These changes are accurate as of 2/6/18 11:59 PM.  If you have any questions, ask your nurse or doctor.               These medicines have changed or have updated prescriptions.        Dose/Directions    hydrocortisone 2.5 % cream   This may have changed:    - when to take this  - reasons to take this  - additional instructions   Used for:  Blepharitis of right upper eyelid, unspecified type        Apply very small amount to eyelids up to twice daily   Quantity:  20 g   Refills:  3                Primary Care Provider Office Phone # Fax #    Phuong Story [phone] [facility] [address]        Equal Access to Services     FILI MCKEON : Hadii aad ku hadsunirina Madden, wajim colin, qaregis galeanomajanice hansen, papito bueno. So [facility] [phone].    ATENCIÓN: Si habla español, tiene a ruiz disposición servicios gratuitos de asistencia lingüística. [facility] [phone].    We comply with applicable federal civil rights laws and [place] laws. We do not discriminate on the basis of race, color, national origin, age, disability, sex, sexual orientation, or gender identity.            Thank you!     Thank you for choosing [facility]  for your care. Our goal is always to provide you with excellent care. Hearing back from our patients is one way we can continue to improve our services. Please take a few minutes to complete the written survey that you may receive in the mail after your visit with us. Thank you!             Your Updated Medication List - Protect others around you: Learn how to safely use, store and throw away your medicines at www.disposemymeds.org.          This list is accurate as of 2/6/18 11:59 PM.  Always use your most recent med list.                   Brand Name Dispense Instructions for use Diagnosis    calcium carbonate 1250 MG tablet    OS-PACO 500 mg [place]. [place]     Take 2 tablets by mouth daily        hydrocortisone 2.5 % cream     20 g    Apply very small amount to eyelids up to twice daily    Blepharitis of right upper eyelid, unspecified type       loratadine 10 MG tablet    CLARITIN    3 MONTHS    ONE DAILY        melatonin 1 MG/ML Liqd liquid      Take 3 mg by mouth nightly as needed for sleep        multivitamin per tablet     100 tablet    Take 1 tablet by mouth daily.    Routine general medical examination at a health care facility       omega 3 1000 MG Caps     90 Cap    once daily    Urticaria, Hives       RANITIDINE HCL PO      Take 150 mg by mouth At Bedtime        vitamin D3 2000 UNITS  Caps      1 capsule daily - takes only intermittently    Vitamin D deficiency

## 2018-02-06 NOTE — LETTER
[address] [address]  Tel. [phone]  Fax [phone]      To Whom It May Concern    Re: Edwina Zhou is scheduled for surgery on Tues 2/13/18.  She will require an 6-8 week PRISCA post op .  Please let me know if there are any questions    Sincerely    North Cai M.D.

## 2018-02-06 NOTE — NURSING NOTE
"Chief Complaint   Patient presents with     RECHECK     Discuss surgery scheduled for 2/13/2018.       Initial /60  Pulse 64  Ht 5' 2\" (1.575 m)  Wt 140 lb (63.5 kg)  LMP 06/03/2010  Breastfeeding? No  BMI 25.61 kg/m2 Estimated body mass index is 25.61 kg/(m^2) as calculated from the following:    Height as of this encounter: 5' 2\" (1.575 m).    Weight as of this encounter: 140 lb (63.5 kg).  Medication Reconciliation: complete   Negra Lind LPN      "

## 2018-02-08 NOTE — PROGRESS NOTES
"The patient is a 62 year old White female , the largest 7 lbs. 9 oz., Postmenopausal at age 52 for contraception, Not on HRT whom I am asked to see by Dr. Elmore for evaluation of Pelvic organ prolapse with urinary incontinence.The patient states that in October 2017 while in the shower she noticed something bulging from the vagina presented to Dr. Elmore  for evaluation And a \" large cystocele and uterine descensus\" was noted.  The patient says that for the last couple of years she's had leakage of urine with laughing lifting coughing and straining but does require her to wear constant protection Pregnancy history includes Vaginal delivery ×3 her first child was smaller than her second but the patient pushed hard for 2 hours and states it was a difficult delivery, She denies instrumentation, No known history of bladder or bowel injury. Patient complains of urinary leakage with the above activities. The patient also notices urgency frequency with occasional leakage The patient states that she is very active and wants to remain active and this is become an interference in her lifestyle  The patient underwent a urodynamic study in January 4, 2018 with the following findings    Somewhat generous bladder capacity      Otherwise normal uroflow.      I did not find any evidence of voiding dysfunction or obstruction with adequate emptying.       There is evidence of pelvic floor relaxation with a symptomatic hypermobile urethrovesical angle with urinary stress incontinence that resolves with correction of the defect.  There is also a grade 2-3 midline cystocele with mild uterine descent that resolves with correction of the apical compartment defect.  There is a grade 1 distal rectocele the patient also experiences.  Urinary urgency with occasional urge incontinence.  We discussed the Retopubic tension free vaginal tape sling.  We reviewed the risks, benefits, and alternative modes of treatment, the success " rates, issues of retention, as well as pre and post op cares and restrictions.  All of her questions have been answered and informed consent has been obtained.  The findings of pelvic floor relaxation including Cystocele, Uterine Descent and Rectocele were discussed at length as were the risks, benefits, and alternative modes of treatment.  We discussed a conservative wait-and-see approach, the use of a pessary, conventional repairs, the use of biologics, and mesh augmentation, as well as robotic supracervical hysterectomy, BSO with sacral colpopexy, the success rates, issues of erosion, the FDA bulletin on the use of mesh for pelvic floor disorders, post op pain, dysparunea, voiding or defacatory dysfunction as well as injury to other organs, bleeding or infection as well as pre-and postop instructions the patient has given us a tremendous amount of clot and would like to proceed with surgical therapy we also discussed the issue of morcellation.  Versus larger laparotomy incision and she will let us know if her final decision preoperatively prior to surgery  The patient presents today for her final preop visit.  I again reviewed the findings with her again discussed risks benefits and alternatives success rates pre-and postop instructions and cares.  I have answered all of her questions and informed consent has been obtained     (N81.4) Cystocele with uterine descensus  (primary encounter diagnosis)  Comment: As above  Plan: Proceed with robotic LSH BSO sacral colpopexy retropubic TVT sling with cystoscopy.  Patient to let us know her final decision regarding morcellation.  Detailed written outline of our discussion given today.  Preop bowel prep as per instructions    North Cai M.D.  15 min spent w > 50% in counseling

## 2018-02-08 NOTE — PATIENT INSTRUCTIONS
You can reach your [facility] Care Team any time of the day by calling [phone]. This number will put you in touch with the 24 hour nurse line if the clinic is closed.    To contact your OB/GYN Station Coordinator/Surgery Scheduler please call [phone]. This is a direct number for your care team between 8 a.m. and 4 p.m. Monday through Friday.    [facility] is open for your convenience:  Monday through Friday 8 a.m. to 6 p.m.  Closed weekends and all major holidays.

## 2018-02-10 ENCOUNTER — OFFICE VISIT (OUTPATIENT)
Dept: FAMILY MEDICINE | Facility: CLINIC | Age: 63
End: 2018-02-10
Payer: COMMERCIAL

## 2018-02-10 VITALS
SYSTOLIC BLOOD PRESSURE: 110 MMHG | WEIGHT: 139 LBS | DIASTOLIC BLOOD PRESSURE: 66 MMHG | BODY MASS INDEX: 25.42 KG/M2 | TEMPERATURE: 98.3 F | OXYGEN SATURATION: 99 % | HEART RATE: 84 BPM

## 2018-02-10 DIAGNOSIS — Z01.818 PREOP GENERAL PHYSICAL EXAM: Primary | ICD-10-CM

## 2018-02-10 DIAGNOSIS — N81.4 CYSTOCELE WITH UTERINE DESCENSUS: ICD-10-CM

## 2018-02-10 LAB
ERYTHROCYTE [DISTWIDTH] IN BLOOD BY AUTOMATED COUNT: 11.8 % (ref 10–15)
HCT VFR BLD AUTO: 40.1 % (ref 35–47)
HGB BLD-MCNC: 13.4 G/DL (ref 11.7–15.7)
MCH RBC QN AUTO: 30.9 PG (ref 26.5–33)
MCHC RBC AUTO-ENTMCNC: 33.4 G/DL (ref 31.5–36.5)
MCV RBC AUTO: 93 FL (ref 78–100)
PLATELET # BLD AUTO: 275 10E9/L (ref 150–450)
RBC # BLD AUTO: 4.33 10E12/L (ref 3.8–5.2)
WBC # BLD AUTO: 5.4 10E9/L (ref 4–11)

## 2018-02-10 PROCEDURE — 85027 COMPLETE CBC AUTOMATED: CPT | Performed by: PHYSICIAN ASSISTANT

## 2018-02-10 PROCEDURE — 93000 ELECTROCARDIOGRAM COMPLETE: CPT | Performed by: PHYSICIAN ASSISTANT

## 2018-02-10 PROCEDURE — 99214 OFFICE O/P EST MOD 30 MIN: CPT | Performed by: PHYSICIAN ASSISTANT

## 2018-02-10 PROCEDURE — 36415 COLL VENOUS BLD VENIPUNCTURE: CPT | Performed by: PHYSICIAN ASSISTANT

## 2018-02-10 NOTE — NURSING NOTE
"Chief Complaint   Patient presents with     Pre-Op Exam       Initial /66  Pulse 84  Temp 98.3  F (36.8  C) (Oral)  Wt 139 lb (63 kg)  LMP 06/03/2010  SpO2 99%  BMI 25.42 kg/m2 Estimated body mass index is 25.42 kg/(m^2) as calculated from the following:    Height as of 2/6/18: 5' 2\" (1.575 m).    Weight as of this encounter: 139 lb (63 kg).  Medication Reconciliation: complete     Esthela Balderas MA      "
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
## 2018-02-10 NOTE — MR AVS SNAPSHOT
After Visit Summary   2/10/2018    Edwina Zhou    MRN: [id number]           Patient Information     Date Of Birth          1955        Visit Information        Provider Department      2/10/2018 8:00 AM Lorna Carvalho PA-C [facility]        Today's Diagnoses     Preop general physical exam    -  1      Care Instructions      Before Your Surgery      Call your surgeon if there is any change in your health. This includes signs of a cold or flu (such as a sore throat, runny nose, cough, rash or fever).    Do not smoke, drink alcohol or take over the counter medicine (unless your surgeon or primary care doctor tells you to) for the 24 hours before and after surgery.    If you take prescribed drugs: Follow your doctor s orders about which medicines to take and which to stop until after surgery.    Eating and drinking prior to surgery: follow the instructions from your surgeon    Take a shower or bath the night before surgery. Use the soap your surgeon gave you to gently clean your skin. If you do not have soap from your surgeon, use your regular soap. Do not shave or scrub the surgery site.  Wear clean pajamas and have clean sheets on your bed.           Follow-ups after your visit        Your next 10 appointments already scheduled     Feb 13, 2018   Procedure with North Cai MD   [facility] (--)    [address]  [facility] [address]   [phone]            Feb 27, 2018  1:30 PM CST   SHORT with North Cai MD   [facility]    [address]  [facility] [address]   [phone]              Who to contact     If you have questions or need follow up information about today's clinic visit or your schedule please contact [facility] directly at [phone].  Normal or non-critical lab and imaging results will be communicated to you by MyChart, letter or phone within 4  business days after the clinic has received the results. If you do not hear from us within 7 days, please contact the clinic through [employer] or phone. If you have a critical or abnormal lab result, we will notify you by phone as soon as possible.  Submit refill requests through [employer] or call your pharmacy and they will forward the refill request to us. Please allow 3 business days for your refill to be completed.          Additional Information About Your Visit        [employer] Information     [employer] gives you secure access to your electronic health record. If you see a primary care provider, you can also send messages to your care team and make appointments. If you have questions, please call your primary care clinic.  If you do not have a primary care provider, please call [phone] and they will assist you.        Care EveryWhere ID     This is your Care EveryWhere ID. This could be used by other organizations to access your [facility] medical records  [id number]        Your Vitals Were     Pulse Temperature Last Period Pulse Oximetry BMI (Body Mass Index)       84 98.3  F (36.8  C) (Oral) 06/03/2010 99% 25.42 kg/m2        Blood Pressure from Last 3 Encounters:   02/10/18 110/66   02/06/18 120/60   01/04/18 122/68    Weight from Last 3 Encounters:   02/10/18 139 lb (63 kg)   02/06/18 140 lb (63.5 kg)   01/04/18 133 lb (60.3 kg)              We Performed the Following     CBC with platelets     EKG 12-lead complete w/read - Clinics          Today's Medication Changes          These changes are accurate as of 2/10/18  8:34 AM.  If you have any questions, ask your nurse or doctor.               These medicines have changed or have updated prescriptions.        Dose/Directions    hydrocortisone 2.5 % cream   This may have changed:    - when to take this  - reasons to take this  - additional instructions   Used for:  Blepharitis of right upper eyelid, unspecified type        Apply very small amount to eyelids up  to twice daily   Quantity:  20 g   Refills:  3                Primary Care Provider Office Phone # Fax #    Phuong Story [phone]       [address] [place] [address]        Equal Access to Services     FILI MCKEON : Endy apodaca reyesirina Soomaali, waaxda luqadaha, qaybta kaalmada adeegyada, papito magui jesseamado horowitzdwayne simmssmita bueno. So [facility] [phone].    ATENCIÓN: Si habla español, tiene a ruiz disposición servicios gratuitos de asistencia lingüística. Llame al [phone].    We comply with applicable federal civil rights laws and [place] laws. We do not discriminate on the basis of race, color, national origin, age, disability, sex, sexual orientation, or gender identity.            Thank you!     Thank you for choosing [facility]  for your care. Our goal is always to provide you with excellent care. Hearing back from our patients is one way we can continue to improve our services. Please take a few minutes to complete the written survey that you may receive in the mail after your visit with us. Thank you!             Your Updated Medication List - Protect others around you: Learn how to safely use, store and throw away your medicines at www.disposemymeds.org.          This list is accurate as of 2/10/18  8:34 AM.  Always use your most recent med list.                   Brand Name Dispense Instructions for use Diagnosis    calcium carbonate 1250 MG tablet    OS-PACO 500 mg [place]. [place]     Take 2 tablets by mouth daily        hydrocortisone 2.5 % cream     20 g    Apply very small amount to eyelids up to twice daily    Blepharitis of right upper eyelid, unspecified type       loratadine 10 MG tablet    CLARITIN    3 MONTHS    ONE DAILY        melatonin 1 MG/ML Liqd liquid      Take 3 mg by mouth nightly as needed for sleep        multivitamin per tablet     100 tablet    Take 1 tablet by mouth daily.    Routine general medical examination at a [facility]  facility       omega 3 1000 MG Caps     90 Cap    once daily    Urticaria, Hives       RANITIDINE HCL PO      Take 150 mg by mouth At Bedtime        vitamin D3 2000 UNITS Caps      1 capsule daily - takes only intermittently    Vitamin D deficiency

## 2018-02-12 NOTE — PROGRESS NOTES
Dear Edwina,    Your hemoglobin, white blood cell count, and platelet count were all normal.    If you have further questions about the interpretation of your labs, labtestsonline.org is a good website to check out for further information.    Please contact the clinic if you have additional questions.  Thank you.    Sincerely,    Lorna Carvalho PA-C
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
## 2018-02-13 ENCOUNTER — NURSE TRIAGE (OUTPATIENT)
Dept: NURSING | Facility: CLINIC | Age: 63
End: 2018-02-13

## 2018-02-13 ENCOUNTER — ANESTHESIA (OUTPATIENT)
Dept: SURGERY | Facility: CLINIC | Age: 63
End: 2018-02-13
Payer: COMMERCIAL

## 2018-02-13 ENCOUNTER — ANESTHESIA EVENT (OUTPATIENT)
Dept: SURGERY | Facility: CLINIC | Age: 63
End: 2018-02-13
Payer: COMMERCIAL

## 2018-02-13 ENCOUNTER — HOSPITAL ENCOUNTER (OUTPATIENT)
Facility: CLINIC | Age: 63
Setting detail: OBSERVATION
Discharge: HOME | End: 2018-02-14
Attending: OBSTETRICS & GYNECOLOGY | Admitting: OBSTETRICS & GYNECOLOGY
Payer: COMMERCIAL

## 2018-02-13 DIAGNOSIS — Z98.890 POST-OPERATIVE STATE: Primary | ICD-10-CM

## 2018-02-13 PROBLEM — Z48.89 OBSERVATION AFTER SURGERY: Status: ACTIVE | Noted: 2018-02-13

## 2018-02-13 PROCEDURE — 25000128 H RX IP 250 OP 636: Performed by: NURSE ANESTHETIST, CERTIFIED REGISTERED

## 2018-02-13 PROCEDURE — 40000936 ZZH STATISTIC OUTPATIENT (NON-OBS) NIGHT

## 2018-02-13 PROCEDURE — C1781 MESH (IMPLANTABLE): HCPCS | Performed by: OBSTETRICS & GYNECOLOGY

## 2018-02-13 PROCEDURE — 25000125 ZZHC RX 250: Performed by: OBSTETRICS & GYNECOLOGY

## 2018-02-13 PROCEDURE — 25000566 ZZH SEVOFLURANE, EA 15 MIN: Performed by: OBSTETRICS & GYNECOLOGY

## 2018-02-13 PROCEDURE — 71000013 ZZH RECOVERY PHASE 1 LEVEL 1 EA ADDTL HR: Performed by: OBSTETRICS & GYNECOLOGY

## 2018-02-13 PROCEDURE — G0378 HOSPITAL OBSERVATION PER HR: HCPCS

## 2018-02-13 PROCEDURE — C1769 GUIDE WIRE: HCPCS | Performed by: OBSTETRICS & GYNECOLOGY

## 2018-02-13 PROCEDURE — 58542 LSH W/T/O UT 250 G OR LESS: CPT | Mod: 51 | Performed by: OBSTETRICS & GYNECOLOGY

## 2018-02-13 PROCEDURE — 37000009 ZZH ANESTHESIA TECHNICAL FEE, EACH ADDTL 15 MIN: Performed by: OBSTETRICS & GYNECOLOGY

## 2018-02-13 PROCEDURE — 40000935 ZZH STATISTIC OUTPATIENT (NON-OBS) EVE

## 2018-02-13 PROCEDURE — 40000306 ZZH STATISTIC PRE PROC ASSESS II: Performed by: OBSTETRICS & GYNECOLOGY

## 2018-02-13 PROCEDURE — 25000125 ZZHC RX 250: Performed by: NURSE ANESTHETIST, CERTIFIED REGISTERED

## 2018-02-13 PROCEDURE — 25800025 ZZH RX 258: Performed by: OBSTETRICS & GYNECOLOGY

## 2018-02-13 PROCEDURE — 57425 LAPAROSCOPY SURG COLPOPEXY: CPT | Performed by: OBSTETRICS & GYNECOLOGY

## 2018-02-13 PROCEDURE — 88307 TISSUE EXAM BY PATHOLOGIST: CPT | Mod: 26 | Performed by: OBSTETRICS & GYNECOLOGY

## 2018-02-13 PROCEDURE — 25000132 ZZH RX MED GY IP 250 OP 250 PS 637: Performed by: OBSTETRICS & GYNECOLOGY

## 2018-02-13 PROCEDURE — 25000128 H RX IP 250 OP 636: Performed by: OBSTETRICS & GYNECOLOGY

## 2018-02-13 PROCEDURE — 37000008 ZZH ANESTHESIA TECHNICAL FEE, 1ST 30 MIN: Performed by: OBSTETRICS & GYNECOLOGY

## 2018-02-13 PROCEDURE — 25000128 H RX IP 250 OP 636: Performed by: ANESTHESIOLOGY

## 2018-02-13 PROCEDURE — 52005 CYSTO W/URTRL CATHJ: CPT | Performed by: UROLOGY

## 2018-02-13 PROCEDURE — 27210794 ZZH OR GENERAL SUPPLY STERILE: Performed by: OBSTETRICS & GYNECOLOGY

## 2018-02-13 PROCEDURE — 27210995 ZZH RX 272: Performed by: UROLOGY

## 2018-02-13 PROCEDURE — 88307 TISSUE EXAM BY PATHOLOGIST: CPT | Performed by: OBSTETRICS & GYNECOLOGY

## 2018-02-13 PROCEDURE — S2900 ROBOTIC SURGICAL SYSTEM: HCPCS | Performed by: OBSTETRICS & GYNECOLOGY

## 2018-02-13 PROCEDURE — 71000012 ZZH RECOVERY PHASE 1 LEVEL 1 FIRST HR: Performed by: OBSTETRICS & GYNECOLOGY

## 2018-02-13 PROCEDURE — 36000087 ZZH SURGERY LEVEL 8 EA 15 ADDTL MIN: Performed by: OBSTETRICS & GYNECOLOGY

## 2018-02-13 PROCEDURE — 25000125 ZZHC RX 250: Performed by: ANESTHESIOLOGY

## 2018-02-13 PROCEDURE — 36000085 ZZH SURGERY LEVEL 8 1ST 30 MIN: Performed by: OBSTETRICS & GYNECOLOGY

## 2018-02-13 PROCEDURE — 27211024 ZZHC OR SUPPLY OTHER OPNP: Performed by: OBSTETRICS & GYNECOLOGY

## 2018-02-13 RX ORDER — KETOROLAC TROMETHAMINE 30 MG/ML
30 INJECTION, SOLUTION INTRAMUSCULAR; INTRAVENOUS EVERY 6 HOURS
Status: DISCONTINUED | OUTPATIENT
Start: 2018-02-13 | End: 2018-02-14 | Stop reason: HOSPADM

## 2018-02-13 RX ORDER — OXYCODONE HYDROCHLORIDE 5 MG/1
5-10 TABLET ORAL EVERY 4 HOURS PRN
Status: DISCONTINUED | OUTPATIENT
Start: 2018-02-13 | End: 2018-02-14 | Stop reason: HOSPADM

## 2018-02-13 RX ORDER — LABETALOL HYDROCHLORIDE 5 MG/ML
10 INJECTION, SOLUTION INTRAVENOUS
Status: DISCONTINUED | OUTPATIENT
Start: 2018-02-13 | End: 2018-02-13 | Stop reason: HOSPADM

## 2018-02-13 RX ORDER — FUROSEMIDE 10 MG/ML
INJECTION INTRAMUSCULAR; INTRAVENOUS PRN
Status: DISCONTINUED | OUTPATIENT
Start: 2018-02-13 | End: 2018-02-13

## 2018-02-13 RX ORDER — NALOXONE HYDROCHLORIDE 0.4 MG/ML
.1-.4 INJECTION, SOLUTION INTRAMUSCULAR; INTRAVENOUS; SUBCUTANEOUS
Status: DISCONTINUED | OUTPATIENT
Start: 2018-02-13 | End: 2018-02-14 | Stop reason: HOSPADM

## 2018-02-13 RX ORDER — PROPOFOL 10 MG/ML
INJECTION, EMULSION INTRAVENOUS PRN
Status: DISCONTINUED | OUTPATIENT
Start: 2018-02-13 | End: 2018-02-13

## 2018-02-13 RX ORDER — LIDOCAINE 40 MG/G
CREAM TOPICAL
Status: DISCONTINUED | OUTPATIENT
Start: 2018-02-13 | End: 2018-02-13 | Stop reason: HOSPADM

## 2018-02-13 RX ORDER — IBUPROFEN 600 MG/1
600 TABLET, FILM COATED ORAL EVERY 6 HOURS PRN
Status: DISCONTINUED | OUTPATIENT
Start: 2018-02-14 | End: 2018-02-14 | Stop reason: HOSPADM

## 2018-02-13 RX ORDER — PROPOFOL 10 MG/ML
INJECTION, EMULSION INTRAVENOUS CONTINUOUS PRN
Status: DISCONTINUED | OUTPATIENT
Start: 2018-02-13 | End: 2018-02-13

## 2018-02-13 RX ORDER — BUPIVACAINE HYDROCHLORIDE AND EPINEPHRINE 2.5; 5 MG/ML; UG/ML
INJECTION, SOLUTION EPIDURAL; INFILTRATION; INTRACAUDAL; PERINEURAL PRN
Status: DISCONTINUED | OUTPATIENT
Start: 2018-02-13 | End: 2018-02-13 | Stop reason: HOSPADM

## 2018-02-13 RX ORDER — LIDOCAINE 40 MG/G
CREAM TOPICAL
Status: DISCONTINUED | OUTPATIENT
Start: 2018-02-13 | End: 2018-02-14 | Stop reason: HOSPADM

## 2018-02-13 RX ORDER — DIMENHYDRINATE 50 MG/ML
25 INJECTION, SOLUTION INTRAMUSCULAR; INTRAVENOUS
Status: DISCONTINUED | OUTPATIENT
Start: 2018-02-13 | End: 2018-02-13 | Stop reason: HOSPADM

## 2018-02-13 RX ORDER — BUPIVACAINE HYDROCHLORIDE 2.5 MG/ML
INJECTION, SOLUTION INFILTRATION; PERINEURAL PRN
Status: DISCONTINUED | OUTPATIENT
Start: 2018-02-13 | End: 2018-02-13 | Stop reason: HOSPADM

## 2018-02-13 RX ORDER — NALOXONE HYDROCHLORIDE 0.4 MG/ML
.1-.4 INJECTION, SOLUTION INTRAMUSCULAR; INTRAVENOUS; SUBCUTANEOUS
Status: ACTIVE | OUTPATIENT
Start: 2018-02-13 | End: 2018-02-14

## 2018-02-13 RX ORDER — SODIUM CHLORIDE, SODIUM LACTATE, POTASSIUM CHLORIDE, CALCIUM CHLORIDE 600; 310; 30; 20 MG/100ML; MG/100ML; MG/100ML; MG/100ML
INJECTION, SOLUTION INTRAVENOUS CONTINUOUS
Status: DISCONTINUED | OUTPATIENT
Start: 2018-02-13 | End: 2018-02-13 | Stop reason: HOSPADM

## 2018-02-13 RX ORDER — IBUPROFEN 600 MG/1
600 TABLET, FILM COATED ORAL EVERY 6 HOURS PRN
Qty: 30 TABLET | Refills: 0 | COMMUNITY
Start: 2018-02-13 | End: 2021-12-17

## 2018-02-13 RX ORDER — CLINDAMYCIN PHOSPHATE 900 MG/50ML
900 INJECTION, SOLUTION INTRAVENOUS
Status: COMPLETED | OUTPATIENT
Start: 2018-02-13 | End: 2018-02-13

## 2018-02-13 RX ORDER — ONDANSETRON 2 MG/ML
INJECTION INTRAMUSCULAR; INTRAVENOUS PRN
Status: DISCONTINUED | OUTPATIENT
Start: 2018-02-13 | End: 2018-02-13

## 2018-02-13 RX ORDER — ONDANSETRON 4 MG/1
4 TABLET, ORALLY DISINTEGRATING ORAL EVERY 6 HOURS PRN
Status: DISCONTINUED | OUTPATIENT
Start: 2018-02-13 | End: 2018-02-14 | Stop reason: HOSPADM

## 2018-02-13 RX ORDER — CLINDAMYCIN PHOSPHATE 900 MG/50ML
900 INJECTION, SOLUTION INTRAVENOUS SEE ADMIN INSTRUCTIONS
Status: DISCONTINUED | OUTPATIENT
Start: 2018-02-13 | End: 2018-02-13 | Stop reason: HOSPADM

## 2018-02-13 RX ORDER — LIDOCAINE HYDROCHLORIDE 10 MG/ML
INJECTION, SOLUTION INFILTRATION; PERINEURAL PRN
Status: DISCONTINUED | OUTPATIENT
Start: 2018-02-13 | End: 2018-02-13

## 2018-02-13 RX ORDER — EPHEDRINE SULFATE 50 MG/ML
INJECTION, SOLUTION INTRAMUSCULAR; INTRAVENOUS; SUBCUTANEOUS PRN
Status: DISCONTINUED | OUTPATIENT
Start: 2018-02-13 | End: 2018-02-13

## 2018-02-13 RX ORDER — DIAZEPAM 10 MG/2ML
2.5 INJECTION, SOLUTION INTRAMUSCULAR; INTRAVENOUS
Status: DISCONTINUED | OUTPATIENT
Start: 2018-02-13 | End: 2018-02-13 | Stop reason: HOSPADM

## 2018-02-13 RX ORDER — MEPERIDINE HYDROCHLORIDE 25 MG/ML
12.5 INJECTION INTRAMUSCULAR; INTRAVENOUS; SUBCUTANEOUS EVERY 5 MIN PRN
Status: DISCONTINUED | OUTPATIENT
Start: 2018-02-13 | End: 2018-02-13 | Stop reason: HOSPADM

## 2018-02-13 RX ORDER — HYDROMORPHONE HYDROCHLORIDE 1 MG/ML
0.2 INJECTION, SOLUTION INTRAMUSCULAR; INTRAVENOUS; SUBCUTANEOUS
Status: DISCONTINUED | OUTPATIENT
Start: 2018-02-13 | End: 2018-02-14 | Stop reason: HOSPADM

## 2018-02-13 RX ORDER — ACETAMINOPHEN 325 MG/1
650 TABLET ORAL EVERY 6 HOURS PRN
Status: DISCONTINUED | OUTPATIENT
Start: 2018-02-13 | End: 2018-02-14 | Stop reason: HOSPADM

## 2018-02-13 RX ORDER — ACETAMINOPHEN 325 MG/1
650 TABLET ORAL EVERY 4 HOURS PRN
Qty: 100 TABLET | Refills: 0 | COMMUNITY
Start: 2018-02-13 | End: 2018-03-26 | Stop reason: ALTCHOICE

## 2018-02-13 RX ORDER — ALBUTEROL SULFATE 0.83 MG/ML
2.5 SOLUTION RESPIRATORY (INHALATION) EVERY 4 HOURS PRN
Status: DISCONTINUED | OUTPATIENT
Start: 2018-02-13 | End: 2018-02-13 | Stop reason: HOSPADM

## 2018-02-13 RX ORDER — ONDANSETRON 2 MG/ML
4 INJECTION INTRAMUSCULAR; INTRAVENOUS EVERY 30 MIN PRN
Status: DISCONTINUED | OUTPATIENT
Start: 2018-02-13 | End: 2018-02-13 | Stop reason: HOSPADM

## 2018-02-13 RX ORDER — KETOROLAC TROMETHAMINE 30 MG/ML
INJECTION, SOLUTION INTRAMUSCULAR; INTRAVENOUS PRN
Status: DISCONTINUED | OUTPATIENT
Start: 2018-02-13 | End: 2018-02-13

## 2018-02-13 RX ORDER — DEXAMETHASONE SODIUM PHOSPHATE 4 MG/ML
INJECTION, SOLUTION INTRA-ARTICULAR; INTRALESIONAL; INTRAMUSCULAR; INTRAVENOUS; SOFT TISSUE PRN
Status: DISCONTINUED | OUTPATIENT
Start: 2018-02-13 | End: 2018-02-13

## 2018-02-13 RX ORDER — OXYCODONE HYDROCHLORIDE 5 MG/1
5-10 TABLET ORAL
Qty: 18 TABLET | Refills: 0 | Status: SHIPPED | OUTPATIENT
Start: 2018-02-13 | End: 2018-02-27

## 2018-02-13 RX ORDER — ONDANSETRON 2 MG/ML
4 INJECTION INTRAMUSCULAR; INTRAVENOUS EVERY 6 HOURS PRN
Status: DISCONTINUED | OUTPATIENT
Start: 2018-02-13 | End: 2018-02-14 | Stop reason: HOSPADM

## 2018-02-13 RX ORDER — ONDANSETRON 4 MG/1
4 TABLET, ORALLY DISINTEGRATING ORAL EVERY 30 MIN PRN
Status: DISCONTINUED | OUTPATIENT
Start: 2018-02-13 | End: 2018-02-13 | Stop reason: HOSPADM

## 2018-02-13 RX ORDER — SCOLOPAMINE TRANSDERMAL SYSTEM 1 MG/1
1 PATCH, EXTENDED RELEASE TRANSDERMAL
Status: DISCONTINUED | OUTPATIENT
Start: 2018-02-13 | End: 2018-02-13 | Stop reason: HOSPADM

## 2018-02-13 RX ORDER — BUPIVACAINE HYDROCHLORIDE AND EPINEPHRINE 5; 5 MG/ML; UG/ML
INJECTION, SOLUTION PERINEURAL PRN
Status: DISCONTINUED | OUTPATIENT
Start: 2018-02-13 | End: 2018-02-13 | Stop reason: HOSPADM

## 2018-02-13 RX ORDER — FENTANYL CITRATE 50 UG/ML
INJECTION, SOLUTION INTRAMUSCULAR; INTRAVENOUS PRN
Status: DISCONTINUED | OUTPATIENT
Start: 2018-02-13 | End: 2018-02-13

## 2018-02-13 RX ORDER — ACETAMINOPHEN 10 MG/ML
1000 INJECTION, SOLUTION INTRAVENOUS
Status: DISCONTINUED | OUTPATIENT
Start: 2018-02-13 | End: 2018-02-13 | Stop reason: HOSPADM

## 2018-02-13 RX ORDER — GLYCOPYRROLATE 0.2 MG/ML
INJECTION, SOLUTION INTRAMUSCULAR; INTRAVENOUS PRN
Status: DISCONTINUED | OUTPATIENT
Start: 2018-02-13 | End: 2018-02-13

## 2018-02-13 RX ORDER — NEOSTIGMINE METHYLSULFATE 1 MG/ML
VIAL (ML) INJECTION PRN
Status: DISCONTINUED | OUTPATIENT
Start: 2018-02-13 | End: 2018-02-13

## 2018-02-13 RX ORDER — SODIUM CHLORIDE, SODIUM LACTATE, POTASSIUM CHLORIDE, CALCIUM CHLORIDE 600; 310; 30; 20 MG/100ML; MG/100ML; MG/100ML; MG/100ML
INJECTION, SOLUTION INTRAVENOUS CONTINUOUS
Status: DISCONTINUED | OUTPATIENT
Start: 2018-02-13 | End: 2018-02-13

## 2018-02-13 RX ORDER — PHENAZOPYRIDINE HYDROCHLORIDE 200 MG/1
200 TABLET, FILM COATED ORAL ONCE
Status: COMPLETED | OUTPATIENT
Start: 2018-02-13 | End: 2018-02-13

## 2018-02-13 RX ORDER — FENTANYL CITRATE 50 UG/ML
25-50 INJECTION, SOLUTION INTRAMUSCULAR; INTRAVENOUS
Status: DISCONTINUED | OUTPATIENT
Start: 2018-02-13 | End: 2018-02-13 | Stop reason: HOSPADM

## 2018-02-13 RX ADMIN — OXYCODONE HYDROCHLORIDE 5 MG: 5 TABLET ORAL at 19:19

## 2018-02-13 RX ADMIN — ROCURONIUM BROMIDE 50 MG: 10 INJECTION INTRAVENOUS at 07:47

## 2018-02-13 RX ADMIN — SCOLOPAMINE TRANSDERMAL SYSTEM 1 PATCH: 1 PATCH, EXTENDED RELEASE TRANSDERMAL at 07:01

## 2018-02-13 RX ADMIN — LIDOCAINE HYDROCHLORIDE 30 MG: 10 INJECTION, SOLUTION INFILTRATION; PERINEURAL at 07:47

## 2018-02-13 RX ADMIN — SODIUM CHLORIDE, POTASSIUM CHLORIDE, SODIUM LACTATE AND CALCIUM CHLORIDE: 600; 310; 30; 20 INJECTION, SOLUTION INTRAVENOUS at 13:23

## 2018-02-13 RX ADMIN — ROCURONIUM BROMIDE 10 MG: 10 INJECTION INTRAVENOUS at 08:35

## 2018-02-13 RX ADMIN — MIDAZOLAM 2 MG: 1 INJECTION INTRAMUSCULAR; INTRAVENOUS at 07:42

## 2018-02-13 RX ADMIN — SODIUM CHLORIDE, POTASSIUM CHLORIDE, SODIUM LACTATE AND CALCIUM CHLORIDE: 600; 310; 30; 20 INJECTION, SOLUTION INTRAVENOUS at 11:27

## 2018-02-13 RX ADMIN — Medication 5 MG: at 08:18

## 2018-02-13 RX ADMIN — ROCURONIUM BROMIDE 10 MG: 10 INJECTION INTRAVENOUS at 11:26

## 2018-02-13 RX ADMIN — ROCURONIUM BROMIDE 10 MG: 10 INJECTION INTRAVENOUS at 09:45

## 2018-02-13 RX ADMIN — FUROSEMIDE 5 MG: 10 INJECTION, SOLUTION INTRAVENOUS at 12:22

## 2018-02-13 RX ADMIN — ROCURONIUM BROMIDE 10 MG: 10 INJECTION INTRAVENOUS at 11:02

## 2018-02-13 RX ADMIN — PHENAZOPYRIDINE HYDROCHLORIDE 200 MG: 200 TABLET ORAL at 07:02

## 2018-02-13 RX ADMIN — ONDANSETRON 4 MG: 2 INJECTION INTRAMUSCULAR; INTRAVENOUS at 07:47

## 2018-02-13 RX ADMIN — FENTANYL CITRATE 100 MCG: 50 INJECTION, SOLUTION INTRAMUSCULAR; INTRAVENOUS at 07:47

## 2018-02-13 RX ADMIN — GLYCOPYRROLATE 0.4 MG: 0.2 INJECTION, SOLUTION INTRAMUSCULAR; INTRAVENOUS at 12:29

## 2018-02-13 RX ADMIN — KETOROLAC TROMETHAMINE 30 MG: 30 INJECTION, SOLUTION INTRAMUSCULAR at 07:47

## 2018-02-13 RX ADMIN — FENTANYL CITRATE 50 MCG: 50 INJECTION, SOLUTION INTRAMUSCULAR; INTRAVENOUS at 11:03

## 2018-02-13 RX ADMIN — SODIUM CHLORIDE, POTASSIUM CHLORIDE, SODIUM LACTATE AND CALCIUM CHLORIDE: 600; 310; 30; 20 INJECTION, SOLUTION INTRAVENOUS at 09:00

## 2018-02-13 RX ADMIN — HYDROMORPHONE HYDROCHLORIDE 1 MG: 1 INJECTION, SOLUTION INTRAMUSCULAR; INTRAVENOUS; SUBCUTANEOUS at 07:47

## 2018-02-13 RX ADMIN — PROPOFOL 80 MCG/KG/MIN: 10 INJECTION, EMULSION INTRAVENOUS at 07:47

## 2018-02-13 RX ADMIN — SODIUM CHLORIDE, POTASSIUM CHLORIDE, SODIUM LACTATE AND CALCIUM CHLORIDE: 600; 310; 30; 20 INJECTION, SOLUTION INTRAVENOUS at 07:41

## 2018-02-13 RX ADMIN — PHENYLEPHRINE HYDROCHLORIDE 100 MCG: 10 INJECTION, SOLUTION INTRAMUSCULAR; INTRAVENOUS; SUBCUTANEOUS at 08:04

## 2018-02-13 RX ADMIN — Medication 7.5 MG: at 07:55

## 2018-02-13 RX ADMIN — Medication 2.5 MG: at 12:29

## 2018-02-13 RX ADMIN — KETOROLAC TROMETHAMINE 30 MG: 30 INJECTION, SOLUTION INTRAMUSCULAR at 22:37

## 2018-02-13 RX ADMIN — SODIUM CHLORIDE, POTASSIUM CHLORIDE, SODIUM LACTATE AND CALCIUM CHLORIDE: 600; 310; 30; 20 INJECTION, SOLUTION INTRAVENOUS at 16:38

## 2018-02-13 RX ADMIN — PHENYLEPHRINE HYDROCHLORIDE 50 MCG: 10 INJECTION, SOLUTION INTRAMUSCULAR; INTRAVENOUS; SUBCUTANEOUS at 11:28

## 2018-02-13 RX ADMIN — GLYCOPYRROLATE 0.2 MG: 0.2 INJECTION, SOLUTION INTRAMUSCULAR; INTRAVENOUS at 07:47

## 2018-02-13 RX ADMIN — GENTAMICIN SULFATE 120 MG: 40 INJECTION, SOLUTION INTRAMUSCULAR; INTRAVENOUS at 07:56

## 2018-02-13 RX ADMIN — CLINDAMYCIN PHOSPHATE 900 MG: 18 INJECTION, SOLUTION INTRAVENOUS at 07:41

## 2018-02-13 RX ADMIN — KETOROLAC TROMETHAMINE 30 MG: 30 INJECTION, SOLUTION INTRAMUSCULAR at 16:38

## 2018-02-13 RX ADMIN — DEXAMETHASONE SODIUM PHOSPHATE 8 MG: 4 INJECTION, SOLUTION INTRA-ARTICULAR; INTRALESIONAL; INTRAMUSCULAR; INTRAVENOUS; SOFT TISSUE at 07:47

## 2018-02-13 RX ADMIN — FENTANYL CITRATE 50 MCG: 50 INJECTION, SOLUTION INTRAMUSCULAR; INTRAVENOUS at 11:09

## 2018-02-13 RX ADMIN — PROPOFOL 200 MG: 10 INJECTION, EMULSION INTRAVENOUS at 07:47

## 2018-02-13 NOTE — ANESTHESIA POSTPROCEDURE EVALUATION
Patient: Edwina Zhou    Procedure(s):  Cystoscopy, bilateral ureteral stent placement,  Robotic supracervical hysterectomy, bilateral salpingo-oophorectomy and sacrocolpopexy,  cystoscopy, removal of ureteral stents - Wound Class: II-Clean Contaminated  cystoscopy with bilateral ureteral stent placement - Wound Class: II-Clean Contaminated   CYSTOSCOPY - Wound Class: II-Clean Contaminated    Diagnosis:synptomatic pelvic floor relaxation and urgency  Diagnosis Additional Information: Dr. Cai and Flavio   symptomatic cystocele, rectocele enterocele, uterine desensus, mixed urinary incontinence       Anesthesia Type:  General, ETT    Note:  Anesthesia Post Evaluation    Patient location during evaluation: PACU  Patient participation: Able to fully participate in evaluation  Level of consciousness: awake  Pain management: adequate  Airway patency: patent  Cardiovascular status: acceptable  Respiratory status: acceptable  Hydration status: acceptable  PONV: none             Last vitals:  Vitals:    02/13/18 0629 02/13/18 1245   BP: 111/63    Resp: 16    Temp: 97.8  F (36.6  C) 98.2  F (36.8  C)   SpO2: 100%          Electronically Signed By: Ross Carlos MD  February 13, 2018  12:59 PM

## 2018-02-13 NOTE — ANESTHESIA CARE TRANSFER NOTE
Patient: Edwina Zhou    Procedure(s):  Cystoscopy, bilateral ureteral stent placement,  Robotic supracervical hysterectomy, bilateral salpingo-oophorectomy and sacrocolpopexy,  cystoscopy, removal of ureteral stents - Wound Class: II-Clean Contaminated  cystoscopy with bilateral ureteral stent placement - Wound Class: II-Clean Contaminated   CYSTOSCOPY - Wound Class: II-Clean Contaminated    Diagnosis: synptomatic pelvic floor relaxation and urgency  Diagnosis Additional Information: No value filed.    Anesthesia Type:   General, ETT     Note:  Airway :Face Mask  Patient transferred to:PACU  Comments: Patient suctioned, extubated awake. Adequate spontaneous respirations.      Vitals: (Last set prior to Anesthesia Care Transfer)    CRNA VITALS  2/13/2018 1212 - 2/13/2018 1250      2/13/2018             Pulse: 66    SpO2: 100 %                Electronically Signed By: SARAH Huynh CRNA  February 13, 2018  12:50 PM

## 2018-02-13 NOTE — DISCHARGE INSTRUCTIONS
GENERAL ANESTHESIA OR SEDATION ADULT DISCHARGE INSTRUCTIONS   SPECIAL PRECAUTIONS FOR 24 HOURS AFTER SURGERY    IT IS NOT UNUSUAL TO FEEL LIGHT-HEADED OR FAINT, UP TO 24 HOURS AFTER SURGERY OR WHILE TAKING PAIN MEDICATION.  IF YOU HAVE THESE SYMPTOMS; SIT FOR A FEW MINUTES BEFORE STANDING AND HAVE SOMEONE ASSIST YOU WHEN YOU GET UP TO WALK OR USE THE BATHROOM.    YOU SHOULD REST AND RELAX FOR THE NEXT 24 HOURS AND YOU MUST MAKE ARRANGEMENTS TO HAVE SOMEONE STAY WITH YOU FOR AT LEAST 24 HOURS AFTER YOUR DISCHARGE.  AVOID HAZARDOUS AND STRENUOUS ACTIVITIES.  DO NOT MAKE IMPORTANT DECISIONS FOR 24 HOURS.    DO NOT DRIVE ANY VEHICLE OR OPERATE MECHANICAL EQUIPMENT FOR 24 HOURS FOLLOWING THE END OF YOUR SURGERY.  EVEN THOUGH YOU MAY FEEL NORMAL, YOUR REACTIONS MAY BE AFFECTED BY THE MEDICATION YOU HAVE RECEIVED.    DO NOT DRINK ALCOHOLIC BEVERAGES FOR 24 HOURS FOLLOWING YOUR SURGERY.    DRINK CLEAR LIQUIDS (APPLE JUICE, GINGER ALE, 7-UP, BROTH, ETC.).  PROGRESS TO YOUR REGULAR DIET AS YOU FEEL ABLE.    YOU MAY HAVE A DRY MOUTH, A SORE THROAT, MUSCLES ACHES OR TROUBLE SLEEPING.  THESE SHOULD GO AWAY AFTER 24 HOURS.    CALL YOUR DOCTOR FOR ANY OF THE FOLLOWING:  SIGNS OF INFECTION (FEVER, GROWING TENDERNESS AT THE SURGERY SITE, A LARGE AMOUNT OF DRAINAGE OR BLEEDING, SEVERE PAIN, FOUL-SMELLING DRAINAGE, REDNESS OR SWELLING.    IT HAS BEEN OVER 8 TO 10 HOURS SINCE SURGERY AND YOU ARE STILL NOT ABLE TO URINATE (PASS WATER).     You received Toradol, an IV form of ibuprofen (Motrin) at 7:45am.  Do not take any ibuprofen products until after 1:45pm if okayed by your surgeon.  Maximum acetaminophen (Tylenol) dose from all sources should not exceed 4 grams (4000 mg) per day.    CYSTOSCOPY DISCHARGE INSTRUCTIONS    YOU MAY GO BACK TO YOUR NORMAL DIET AND ACTIVITY, UNLESS YOUR DOCTOR TELLS YOU NOT TO.    FOR THE NEXT TWO DAYS, YOU MAY NOTICE:    SOME BLOOD IN YOUR URINE.  SOME BURNING WHEN YOU URINATE (USE THE TOILET).  AN URGE  TO URINATE MORE OFTEN.  BLADDER SPASMS.    THESE ARE NORMAL AFTER THE PROCEDURE.  THEY SHOULD GO AWAY AFTER A DAY OR TWO.  TO RELIEVE THESE PROBLEMS:     DRINK 6 TO 8 LARGE GLASSES OF WATER EACH DAY (INCLUDES DRINKS AT MEALS).  THIS WILL HELP CLEAR THE URINE.    TAKE WARM BATHS TO RELIEVE PAIN AND BLADDER SPASMS.  DO NOT ADD ANYTHING TO THE BATH WATER.    YOUR DOCTOR MAY PRESCRIBE PAIN MEDICINE.  YOU MAY ALSO TAKE TYLENOL (ACETAMINOPHEN) FOR PAIN.    CALL YOUR SURGEON IF YOU HAVE:    A FEVER OVER 100 DEGREES FOR MORE THAN A DAY.  CHECK YOUR TEMPERATURE UNDER YOUR TONGUE.    CHILLS.    FAILURE TO URINATE (NO URINE COMES OUT WHEN YOU TRY TO USE THE TOILET).  TRY SOAKING IN A BATHTUB FULL OF WARM WATER.  IF STILL NO URINE, CALL YOUR DOCTOR.    A LOT OF BLOOD IN THE URINE, OR BLOOD CLOTS LARGER THAN A NICKEL.      PAIN IN THE BACK OR BELLY AREA (ABDOMEN).    PAIN OR SPASMS THAT ARE NOT RELIEVED BY WARM TUB BATHS AND PAIN MEDICINE.      SEVERE PAIN, BURNING OR OTHER PROBLEMS WHILE PASSING URINE.    PAIN THAT GETS WORSE AFTER TWO DAYS.        LAPAROSCOPIC HYSTERECTOMY DISCHARGE INSTRUCTIONS    PLEASE RETURN TO THE CLINIC IN:  ____2 WEEKS  ____4 WEEKS  ____6 WEEKS  MAKE THIS APPOINTMENT AFTER YOU GET HOME IF IT HAS NOT ALREADY BEEN SCHEDULED.     YOU HAVE HAD A HYSTERECTOMY (SURGERY TO REMOVE YOUR UTERUS).  YOU CANNOT HAVE CHILDREN AFTER THIS SURGERY.  YOU WILL NO LONGER HAVE MONTHLY PERIODS, UNLESS YOU STILL HAVE YOUR CERVIX (CALLED SUBTOTAL HYSTERECTOMY).  IN THIS CASE, YOU MAY HAVE LIGHT MONTHLY BLEEDING.    DIET  YOU MAY FEEL LESS HUNGRY AT FIRST.  TRY TO EAT A WELL-BALANCED DIET WITH LOTS OF PROTEINS, FRUITS, VEGETABLES AND WHOLE GRAINS.  AVOID SPICY AND GREASY FOODS.    DRINK PLENTY OF FLUIDS--AT LEAST 8 TALL GLASSES A DAY.  WATER IS BEST.  TRY TO LIMIT CAFFEINE (FOUND IN COFFEE, TEA AND COLA DRINKS).  THIS HELPS PREVENT CONSTIPATION (HARD STOOLS THAT ARE DIFFICULT TO PASS).    IF CONSTIPATION IS A PROBLEM, YOU MAY  TAKE ONE OF THESE MEDICINES:  DOCUSATE (COLACE), DOCUSATE WITH CASANTHRANOL (MIRYAM-COLACE), PSYLLIUM (METAMUCIL) OR MILK OF MAGNESIA.  YOU CAN BUY THESE AT THE DRUG STORE.  FOLLOW THE INSTRUCTIONS LISTED ON THE LABEL.    ACTIVITY  YOU WILL NEED PLENTY OF REST AT FIRST.  SLOWLY GO BACK TO YOUR NORMAL ACTIVITIES.  IT WILL HELP TO TAKE SEVERAL SHORT WALKS DURING THE DAY.  IT IS OKAY TO CLIMB STAIRS, BUT USE THE HANDRAIL IN CASE YOU GET DIZZY.    DO NOT DRIVE WHILE USING STRONG PAIN MEDICINE (NARCOTICS).  AFTER THAT, DO NOT DRIVE UNTIL YOU CAN STOMP ON THE BRAKES WITHOUT PAIN.    DO NOT USE TAMPONS, DOUCHE OR HAVE SEX (INTERCOURSE) UNTIL YOU SEE YOUR DOCTOR AGAIN.    DON'T LIFT OR PUSH ANYTHING OVER 20 POUNDS UNTIL YOUR DOCTOR SAYS IT'S SAFE.  AVOID HEAVY OR STRENUOUS EXERCISE.    YOUR DOCTOR WILL TELL YOU WHEN YOU CAN SAFELY RETURN TO WORK.    PAIN CONTROL  YOU MAY HAVE PAIN AROUND YOUR INCISIONS (SURGERY WOUNDS).  THIS SHOULD IMPROVE OVER THE NEXT WEEK.  USE YOUR PAIN MEDICINE AS DIRECTED.  IF YOU HAVE INCREASED PAIN, PLEASE CALL YOUR CLINIC.  IT IS NORMAL TO FEEL A LITTLE SORE AFTER MILD EXERCISE.      FOR THE NEXT TWO DAYS, YOU MAY HAVE SOME PAIN IN YOUR SHOULDERS AND UPPER CHEST.  THIS IS FROM THE AIR PUMPED INTO YOUR BODY DURING THE SURGERY.  TO RELIEVE THIS TYPE OF PAIN, YOU MAY TAKE TYLENOL (ACETAMINOPHEN) OR ADVIL (IBUPROFEN).  FOLLOW THE INSTRUCTIONS LISTED ON THE LABEL.  DRINK A FULL GLASS OF WATER WITH EACH DOSE.  YOU CAN ALSO TRY LYING FLAT OR RAISING YOUR HIPS ABOVE SHOULDER LEVEL.    BATHING  YOU MAY SHOWER OR BATHE AT ANY TIME.  IF YOU TAKE A BATH, DON'T ADD ANYTHING TO THE BATH WATER.    CARING FOR YOUR STITCHES  YOUR BODY WILL ABSORB YOUR STITCHES OVER TIME.  KEEP THEM CLEAN AND DRY.  WEAR LOOSE, COMFORTABLE CLOTHES.    NORMAL SYMPTOMS  YOU MAY NOTICE A SMALL AMOUNT OF BLEEDING FROM YOUR VAGINA.  THIS COULD LAST UP TO A WEEK.  YOU MAY ALSO HAVE BROWN FLUID LEAKING FROM THE VAGINA.  THIS COULD LAST FOR A  FEW WEEKS.  WEAR PADS AS NEEDED.    YOU MAY HAVE BRUISING ON YOUR BELLY.  YOU MIGHT ALSO HAVE A PUFFY FEELING IN YOUR BELLY.    YOU MAY SEE A LITTLE BLOOD OR FLUID OOZING FROM THE INCISION.    HORMONES  IF WE REMOVED YOUR OVARIES, YOU MAY NEED HORMONE MEDICINE (HT, OR HORMONE THERAPY).  DISCUSS THIS WITH YOUR DOCTOR.  IF WE DID NOT REMOVE YOUR OVARIES, YOU SHOULD REACH MENOPAUSE AT THE NORMAL TIME (IN GENERAL, BETWEEN AGES 40 AND 55).    CALL YOUR DOCTOR IF YOU HAVE:  SEVERE CHILLS AND FEVER .4 DEGREES FAHRENHEIT OR HIGHER, TAKEN UNDER THE TONGUE.   BRIGHT RED BLOOD OR LARGE CLOTS COMING OUT OF THE VAGINA--ENOUGH TO SOAK ONE PAD IN AN HOUR.  INCREASED PAIN, WARMTH, SWELLING, REDNESS, BLEEDING OR FLUID LEAKING FROM THE SURGERY SITE.  URINE OR VAGINAL FLUID THAT SMELLS BAD.  YOU CANNOT URINATE (USE THE TOILET), IT BURNS WHEN YOU URINATE OR YOU NEED TO GO MORE OFTEN.  SEVERE NAUSEA (FEELING SICK TO YOUR STOMACH) OR VOMITING (THROWING UP).  INCREASED PAIN THAT YOU CANNOT CONTROL WITH MEDICINE.

## 2018-02-13 NOTE — OP NOTE
DATE OF SERVICE: 2/13/2018  PREOPERATIVE DIAGNOSIS: Cystocele and rectocele  POSTOPERATIVE DIAGNOSIS: Same    PROCEDURES PERFORMED:   1. Cystourethroscopy  2. Placement of open-ended bilateral ureteral stents    STAFF SURGEON: Stephen Muniz MD  ANESTHESIA: General  ESTIMATED BLOOD LOSS: <1 cc  DRAINS/TUBES: Bilateral 6 Fr open-ended ureteral catheters; 16 Fr Veronica catheter  COMPLICATIONS: None.   SPECIMEN: None  SIGNIFICANT FINDINGS: Prolapse noted. Stents placed bilaterally without difficulty.    BRIEF OPERATIVE INDICATIONS: Edwina Zhou is a 62 year old female with prolapse and is undergoing robotic sacrocolpopexy with Dr. Cai. I was asked to assist with placement of ureteral stents for intraoperative assistance with ureteral identification.  After a discussion of all risks, benefits, and alternatives, the patient elected to proceed with ureteral stent placement.      DESCRIPTION OF PROCEDURE:  After informed consent was verified, the patient was transported to the operating room, placed supine on the table. After adequate anesthesia was induced, she was placed in dorsal lithotomy and prepped and draped in the usual sterile fashion. A timeout was taken to confirm correct patient, procedure and laterality. Pre-operative IV antibiotics were administered.    A 22 Finnish rigid cystoscope was inserted per a well-lubricated urethra. The anterior urethra was unremarkable. The ureteral orifices were mildly displaced secondary to prolapse. Bilateral orifices were intubated with a sensor wire, and 6Fr ureteral catheters were advanced into the proximal ureters. Scope was removed and stents were secured to a 16Fr Veronica catheter with silk ties. 10cc in Veronica balloon. The bladder was drained.   The patient tolerated the procedure well.  No apparent complications. Dr. Cai then began his portion of the procedure.      PLAN: Stents and catheter to be removed at the discretion of Dr. Cai and his team.     Stephen Muniz  MD  Urology  [facility]  Clinic Phone [phone]

## 2018-02-13 NOTE — IP AVS SNAPSHOT
[facility] Observation Department    [address]    [facility] [address]    Phone:  [phone]                                       After Visit Summary   2/13/2018    Edwina Zhou    MRN: [id number]           After Visit Summary Signature Page     I have received my discharge instructions, and my questions have been answered. I have discussed any challenges I see with this plan with the nurse or doctor.    ..........................................................................................................................................  Patient/Patient Representative Signature      ..........................................................................................................................................  Patient Representative Print Name and Relationship to Patient    ..................................................               ................................................  Date                                            Time    ..........................................................................................................................................  Reviewed by Signature/Title    ...................................................              ..............................................  Date                                                            Time

## 2018-02-13 NOTE — PLAN OF CARE
Problem: Patient Care Overview  Goal: Plan of Care/Patient Progress Review  ROOM # 233    Living Situation (if not independent, order SW consult): independently with spouse in home  Facility name:  : [facility], [phone]    Activity level at baseline: independent  Activity level on admit:  SBA/assist 1      Patient registered to observation; given Patient Bill of Rights; given the opportunity to ask questions about observation status and their plan of care.  Patient has been oriented to the observation room, bathroom and call light is in place.    Discussed discharge goals and expectations with patient/family.

## 2018-02-13 NOTE — ANESTHESIA PREPROCEDURE EVALUATION
Anesthesia Evaluation     . Pt has had prior anesthetic. Type: General    No history of anesthetic complications          ROS/MED HX    ENT/Pulmonary:  - neg pulmonary ROS     Neurologic:  - neg neurologic ROS     Cardiovascular:  - neg cardiovascular ROS       METS/Exercise Tolerance:     Hematologic:  - neg hematologic  ROS       Musculoskeletal:  - neg musculoskeletal ROS       GI/Hepatic: Comment: Hx of eosinophilic esophagitis    (+) GERD Asymptomatic on medication,       Renal/Genitourinary:  - ROS Renal section negative       Endo:     (+) Other Endocrine Disorder PCOS.      Psychiatric:  - neg psychiatric ROS       Infectious Disease:  - neg infectious disease ROS       Malignancy:      - no malignancy   Other:    - neg other ROS                 Physical Exam  Normal systems: cardiovascular, pulmonary and dental    Airway   Mallampati: II  TM distance: >3 FB  Neck ROM: full    Dental     Cardiovascular   Rhythm and rate: regular and normal      Pulmonary    breath sounds clear to auscultation    Other findings: Lab Test        02/10/18     09/18/17     06/13/16                       0835          1044          1410          WBC          5.4          6.3          7.2           HGB          13.4         13.7         13.6          MCV          93           94           92            PLT          275          276          254            Lab Test        09/18/17     06/13/16     08/24/15                       1044          1410          1031          NA           141          140          140           POTASSIUM    4.3          4.2          4.5           CHLORIDE     107          104          106           CO2          27           29           28            BUN          16           28           19            CR           0.67         0.68         0.68          ANIONGAP     7            7            6             PACO          9.0          9.1          8.9           GLC          90           67*          87                                   Anesthesia Plan      History & Physical Review  History and physical reviewed and following examination; no interval change.    ASA Status:  2 .    NPO Status:  > 8 hours    Plan for General and ETT with Intravenous induction. Maintenance will be Balanced.    PONV prophylaxis:  Ondansetron (or other 5HT-3) and Dexamethasone or Solumedrol       Postoperative Care  Postoperative pain management:  IV analgesics, Oral pain medications and Multi-modal analgesia.      Consents  Anesthetic plan, risks, benefits and alternatives discussed with:  Patient.  Use of blood products discussed: No .   .                          .

## 2018-02-13 NOTE — IP AVS SNAPSHOT
MRN:[id number]                      After Visit Summary   2/13/2018    Edwina Zhou    MRN: [id number]           Thank you!     Thank you for choosing [facility] for your care. Our goal is always to provide you with excellent care. Hearing back from our patients is one way we can continue to improve our services. Please take a few minutes to complete the written survey that you may receive in the mail after you visit. If you would like to speak to someone directly about your visit please contact Patient Relations at [phone]. Thank you!          Patient Information     Date Of Birth          1955        About your hospital stay     You were admitted on:  February 13, 2018 You last received care in the:  [facility] Observation Department    You were discharged on:  February 14, 2018       Who to Call     For medical emergencies, please call 911.  For non-urgent questions about your medical care, please call your primary care provider or clinic, [phone]  For questions related to your surgery, please call your surgery clinic        Attending Provider     Provider Specialty    North Cia MD OB/Gyn       Primary Care Provider Office Phone # Fax #    Phuong Story [phone]      After Care Instructions     Diet Instructions       Resume pre-procedure diet            Discharge Instructions       Patient to follow up with appointment in 1-2 and 6-8 weeks post op            Dressing       Keep dressing clean and dry.  Dressing / incisional care as instructed by RN and or Surgeon            Ice to affected area       Ice to operative site PRN            No lifting        No lifting over 10 lbs and no strenuous physical activity for 12 weeks.  No lifting > 10 lbs for 6 wks  Take your tempature twice daily for 3 days and call if > 100.4  Nothing in vagina for 8 wks  Pt should double void q 2 hrs while awake and call if unable to void  or adequately empty or if having pain unrelieved with meds or questions            Shower       No shower for 24 hours post procedure. May shower Postoperative Day (POD)  1 if fully alert awake and stable                  Your next 10 appointments already scheduled     Feb 27, 2018  1:30 PM CST   SHORT with North Cai MD   [facility] ([facility])    [address]  [facility] [address]   [phone]                         Further instructions from your care team         GENERAL ANESTHESIA OR SEDATION ADULT DISCHARGE INSTRUCTIONS   SPECIAL PRECAUTIONS FOR 24 HOURS AFTER SURGERY    IT IS NOT UNUSUAL TO FEEL LIGHT-HEADED OR FAINT, UP TO 24 HOURS AFTER SURGERY OR WHILE TAKING PAIN MEDICATION.  IF YOU HAVE THESE SYMPTOMS; SIT FOR A FEW MINUTES BEFORE STANDING AND HAVE SOMEONE ASSIST YOU WHEN YOU GET UP TO WALK OR USE THE BATHROOM.    YOU SHOULD REST AND RELAX FOR THE NEXT 24 HOURS AND YOU MUST MAKE ARRANGEMENTS TO HAVE SOMEONE STAY WITH YOU FOR AT LEAST 24 HOURS AFTER YOUR DISCHARGE.  AVOID HAZARDOUS AND STRENUOUS ACTIVITIES.  DO NOT MAKE IMPORTANT DECISIONS FOR 24 HOURS.    DO NOT DRIVE ANY VEHICLE OR OPERATE MECHANICAL EQUIPMENT FOR 24 HOURS FOLLOWING THE END OF YOUR SURGERY.  EVEN THOUGH YOU MAY FEEL NORMAL, YOUR REACTIONS MAY BE AFFECTED BY THE MEDICATION YOU HAVE RECEIVED.    DO NOT DRINK ALCOHOLIC BEVERAGES FOR 24 HOURS FOLLOWING YOUR SURGERY.    DRINK CLEAR LIQUIDS (APPLE JUICE, GINGER ALE, 7-UP, BROTH, ETC.).  PROGRESS TO YOUR REGULAR DIET AS YOU FEEL ABLE.    YOU MAY HAVE A DRY MOUTH, A SORE THROAT, MUSCLES ACHES OR TROUBLE SLEEPING.  THESE SHOULD GO AWAY AFTER 24 HOURS.    CALL YOUR DOCTOR FOR ANY OF THE FOLLOWING:  SIGNS OF INFECTION (FEVER, GROWING TENDERNESS AT THE SURGERY SITE, A LARGE AMOUNT OF DRAINAGE OR BLEEDING, SEVERE PAIN, FOUL-SMELLING DRAINAGE, REDNESS OR SWELLING.    IT HAS BEEN OVER 8 TO 10 HOURS SINCE SURGERY AND YOU ARE STILL NOT ABLE TO  URINATE (PASS WATER).     You received Toradol, an IV form of ibuprofen (Motrin) at 7:45am.  Do not take any ibuprofen products until after 1:45pm if okayed by your surgeon.  Maximum acetaminophen (Tylenol) dose from all sources should not exceed 4 grams (4000 mg) per day.    CYSTOSCOPY DISCHARGE INSTRUCTIONS    YOU MAY GO BACK TO YOUR NORMAL DIET AND ACTIVITY, UNLESS YOUR DOCTOR TELLS YOU NOT TO.    FOR THE NEXT TWO DAYS, YOU MAY NOTICE:    SOME BLOOD IN YOUR URINE.  SOME BURNING WHEN YOU URINATE (USE THE TOILET).  AN URGE TO URINATE MORE OFTEN.  BLADDER SPASMS.    THESE ARE NORMAL AFTER THE PROCEDURE.  THEY SHOULD GO AWAY AFTER A DAY OR TWO.  TO RELIEVE THESE PROBLEMS:     DRINK 6 TO 8 LARGE GLASSES OF WATER EACH DAY (INCLUDES DRINKS AT MEALS).  THIS WILL HELP CLEAR THE URINE.    TAKE WARM BATHS TO RELIEVE PAIN AND BLADDER SPASMS.  DO NOT ADD ANYTHING TO THE BATH WATER.    YOUR DOCTOR MAY PRESCRIBE PAIN MEDICINE.  YOU MAY ALSO TAKE TYLENOL (ACETAMINOPHEN) FOR PAIN.    CALL YOUR SURGEON IF YOU HAVE:    A FEVER OVER 100 DEGREES FOR MORE THAN A DAY.  CHECK YOUR TEMPERATURE UNDER YOUR TONGUE.    CHILLS.    FAILURE TO URINATE (NO URINE COMES OUT WHEN YOU TRY TO USE THE TOILET).  TRY SOAKING IN A BATHTUB FULL OF WARM WATER.  IF STILL NO URINE, CALL YOUR DOCTOR.    A LOT OF BLOOD IN THE URINE, OR BLOOD CLOTS LARGER THAN A NICKEL.      PAIN IN THE BACK OR BELLY AREA (ABDOMEN).    PAIN OR SPASMS THAT ARE NOT RELIEVED BY WARM TUB BATHS AND PAIN MEDICINE.      SEVERE PAIN, BURNING OR OTHER PROBLEMS WHILE PASSING URINE.    PAIN THAT GETS WORSE AFTER TWO DAYS.        LAPAROSCOPIC HYSTERECTOMY DISCHARGE INSTRUCTIONS    PLEASE RETURN TO THE CLINIC IN:  ____2 WEEKS  ____4 WEEKS  ____6 WEEKS  MAKE THIS APPOINTMENT AFTER YOU GET HOME IF IT HAS NOT ALREADY BEEN SCHEDULED.     YOU HAVE HAD A HYSTERECTOMY (SURGERY TO REMOVE YOUR UTERUS).  YOU CANNOT HAVE CHILDREN AFTER THIS SURGERY.  YOU WILL NO LONGER HAVE MONTHLY PERIODS, UNLESS YOU  STILL HAVE YOUR CERVIX (CALLED SUBTOTAL HYSTERECTOMY).  IN THIS CASE, YOU MAY HAVE LIGHT MONTHLY BLEEDING.    DIET  YOU MAY FEEL LESS HUNGRY AT FIRST.  TRY TO EAT A WELL-BALANCED DIET WITH LOTS OF PROTEINS, FRUITS, VEGETABLES AND WHOLE GRAINS.  AVOID SPICY AND GREASY FOODS.    DRINK PLENTY OF FLUIDS--AT LEAST 8 TALL GLASSES A DAY.  WATER IS BEST.  TRY TO LIMIT CAFFEINE (FOUND IN COFFEE, TEA AND COLA DRINKS).  THIS HELPS PREVENT CONSTIPATION (HARD STOOLS THAT ARE DIFFICULT TO PASS).    IF CONSTIPATION IS A PROBLEM, YOU MAY TAKE ONE OF THESE MEDICINES:  DOCUSATE (COLACE), DOCUSATE WITH CASANTHRANOL (MIRYAM-COLACE), PSYLLIUM (METAMUCIL) OR MILK OF MAGNESIA.  YOU CAN BUY THESE AT THE DRUG STORE.  FOLLOW THE INSTRUCTIONS LISTED ON THE LABEL.    ACTIVITY  YOU WILL NEED PLENTY OF REST AT FIRST.  SLOWLY GO BACK TO YOUR NORMAL ACTIVITIES.  IT WILL HELP TO TAKE SEVERAL SHORT WALKS DURING THE DAY.  IT IS OKAY TO CLIMB STAIRS, BUT USE THE HANDRAIL IN CASE YOU GET DIZZY.    DO NOT DRIVE WHILE USING STRONG PAIN MEDICINE (NARCOTICS).  AFTER THAT, DO NOT DRIVE UNTIL YOU CAN STOMP ON THE BRAKES WITHOUT PAIN.    DO NOT USE TAMPONS, DOUCHE OR HAVE SEX (INTERCOURSE) UNTIL YOU SEE YOUR DOCTOR AGAIN.    DON'T LIFT OR PUSH ANYTHING OVER 20 POUNDS UNTIL YOUR DOCTOR SAYS IT'S SAFE.  AVOID HEAVY OR STRENUOUS EXERCISE.    YOUR DOCTOR WILL TELL YOU WHEN YOU CAN SAFELY RETURN TO WORK.    PAIN CONTROL  YOU MAY HAVE PAIN AROUND YOUR INCISIONS (SURGERY WOUNDS).  THIS SHOULD IMPROVE OVER THE NEXT WEEK.  USE YOUR PAIN MEDICINE AS DIRECTED.  IF YOU HAVE INCREASED PAIN, PLEASE CALL YOUR CLINIC.  IT IS NORMAL TO FEEL A LITTLE SORE AFTER MILD EXERCISE.      FOR THE NEXT TWO DAYS, YOU MAY HAVE SOME PAIN IN YOUR SHOULDERS AND UPPER CHEST.  THIS IS FROM THE AIR PUMPED INTO YOUR BODY DURING THE SURGERY.  TO RELIEVE THIS TYPE OF PAIN, YOU MAY TAKE TYLENOL (ACETAMINOPHEN) OR ADVIL (IBUPROFEN).  FOLLOW THE INSTRUCTIONS LISTED ON THE LABEL.  DRINK A FULL GLASS OF  WATER WITH EACH DOSE.  YOU CAN ALSO TRY LYING FLAT OR RAISING YOUR HIPS ABOVE SHOULDER LEVEL.    BATHING  YOU MAY SHOWER OR BATHE AT ANY TIME.  IF YOU TAKE A BATH, DON'T ADD ANYTHING TO THE BATH WATER.    CARING FOR YOUR STITCHES  YOUR BODY WILL ABSORB YOUR STITCHES OVER TIME.  KEEP THEM CLEAN AND DRY.  WEAR LOOSE, COMFORTABLE CLOTHES.    NORMAL SYMPTOMS  YOU MAY NOTICE A SMALL AMOUNT OF BLEEDING FROM YOUR VAGINA.  THIS COULD LAST UP TO A WEEK.  YOU MAY ALSO HAVE BROWN FLUID LEAKING FROM THE VAGINA.  THIS COULD LAST FOR A FEW WEEKS.  WEAR PADS AS NEEDED.    YOU MAY HAVE BRUISING ON YOUR BELLY.  YOU MIGHT ALSO HAVE A PUFFY FEELING IN YOUR BELLY.    YOU MAY SEE A LITTLE BLOOD OR FLUID OOZING FROM THE INCISION.    HORMONES  IF WE REMOVED YOUR OVARIES, YOU MAY NEED HORMONE MEDICINE (HT, OR HORMONE THERAPY).  DISCUSS THIS WITH YOUR DOCTOR.  IF WE DID NOT REMOVE YOUR OVARIES, YOU SHOULD REACH MENOPAUSE AT THE NORMAL TIME (IN GENERAL, BETWEEN AGES 40 AND 55).    CALL YOUR DOCTOR IF YOU HAVE:  SEVERE CHILLS AND FEVER .4 DEGREES FAHRENHEIT OR HIGHER, TAKEN UNDER THE TONGUE.   BRIGHT RED BLOOD OR LARGE CLOTS COMING OUT OF THE VAGINA--ENOUGH TO SOAK ONE PAD IN AN HOUR.  INCREASED PAIN, WARMTH, SWELLING, REDNESS, BLEEDING OR FLUID LEAKING FROM THE SURGERY SITE.  URINE OR VAGINAL FLUID THAT SMELLS BAD.  YOU CANNOT URINATE (USE THE TOILET), IT BURNS WHEN YOU URINATE OR YOU NEED TO GO MORE OFTEN.  SEVERE NAUSEA (FEELING SICK TO YOUR STOMACH) OR VOMITING (THROWING UP).  INCREASED PAIN THAT YOU CANNOT CONTROL WITH MEDICINE.                        Pending Results     Date and Time Order Name Status Description    2/13/2018 1149 Surgical pathology exam In process             Admission Information     Date & Time Provider Department Dept. Phone    2/13/2018 North Cai MD [facility] Observation Department [phone]      Your Vitals Were     Blood Pressure Temperature Respirations Height Weight Last Period     "83/47 (BP Location: Left arm) 97  F (36.1  C) (Oral) 16 1.575 m (5' 2\") 63.3 kg (139 lb 8 oz) 06/03/2010    Pulse Oximetry BMI (Body Mass Index)                93% 25.51 kg/m2          [employer] Information     [employer] gives you secure access to your electronic health record. If you see a primary care provider, you can also send messages to your care team and make appointments. If you have questions, please call your primary care clinic.  If you do not have a primary care provider, please call [phone] and they will assist you.        Care EveryWhere ID     This is your Care EveryWhere ID. This could be used by other organizations to access your [place] medical records  [id number]        Equal Access to Services     FILI MCKEON : Endy Madden, therese colin, jose a hansen, papito bueno. So [facility] [phone].    ATENCIÓN: Si habla español, tiene a ruiz disposición servicios gratuitos de asistencia lingüística. Llame al [phone].    We comply with applicable federal civil rights laws and [place] laws. We do not discriminate on the basis of race, color, national origin, age, disability, sex, sexual orientation, or gender identity.               Review of your medicines      UNREVIEWED medicines. Ask your doctor about these medicines        Dose / Directions    multivitamin per tablet   Used for:  Routine general medical examination at a health care facility        Dose:  1 tablet   Take 1 tablet by mouth daily.   Quantity:  100 tablet   Refills:  12       omega 3 1000 MG Caps   Used for:  Urticaria, Hives        once daily   Quantity:  90 Cap   Refills:  0       RANITIDINE HCL PO   Indication:  smaller dose in am with symptoms        Dose:  150 mg   Take 150 mg by mouth At Bedtime   Refills:  0       vitamin D3 2000 UNITS Caps   Used for:  Vitamin D deficiency        1 capsule daily - takes only intermittently   Refills:  0         START taking     "    Dose / Directions    acetaminophen 325 MG tablet   Commonly known as:  TYLENOL        Dose:  650 mg   Take 2 tablets (650 mg) by mouth every 4 hours as needed for other (mild pain)   Quantity:  100 tablet   Refills:  0       ibuprofen 600 MG tablet   Commonly known as:  ADVIL/MOTRIN        Dose:  600 mg   Take 1 tablet (600 mg) by mouth every 6 hours as needed for pain (mild)   Quantity:  30 tablet   Refills:  0       oxyCODONE IR 5 MG tablet   Commonly known as:  ROXICODONE        Dose:  5-10 mg   Take 1-2 tablets (5-10 mg) by mouth every 3 hours as needed for pain or other (Moderate to Severe)   Quantity:  18 tablet   Refills:  0         CONTINUE these medicines which may have CHANGED, or have new prescriptions. If we are uncertain of the size of tablets/capsules you have at home, strength may be listed as something that might have changed.        Dose / Directions    hydrocortisone 2.5 % cream   This may have changed:    - when to take this  - reasons to take this  - additional instructions   Used for:  Blepharitis of right upper eyelid, unspecified type        Apply very small amount to eyelids up to twice daily   Quantity:  20 g   Refills:  3         CONTINUE these medicines which have NOT CHANGED        Dose / Directions    calcium carbonate 1250 MG tablet   Commonly known as:  OS-PACO 500 mg [place]. [place]        Dose:  2 tablet   Take 2 tablets by mouth daily   Refills:  0       loratadine 10 MG tablet   Commonly known as:  CLARITIN        ONE DAILY   Quantity:  3 MONTHS   Refills:  1 YEAR       melatonin 1 MG/ML Liqd liquid        Dose:  3 mg   Take 3 mg by mouth nightly as needed for sleep   Refills:  0            Where to get your medicines      Some of these will need a paper prescription and others can be bought over the counter. Ask your nurse if you have questions.     Bring a paper prescription for each of these medications     oxyCODONE IR 5 MG tablet       You don't need a prescription for these  medications     acetaminophen 325 MG tablet    ibuprofen 600 MG tablet                Protect others around you: Learn how to safely use, store and throw away your medicines at www.disposemymeds.org.        Information about OPIOIDS     PRESCRIPTION OPIOIDS: WHAT YOU NEED TO KNOW    Prescription opioids can be used to help relieve moderate to severe pain and are often prescribed following a surgery or injury, or for certain health conditions. These medications can be an important part of treatment but also come with serious risks. It is important to work with your health care provider to make sure you are getting the safest, most effective care.    WHAT ARE THE RISKS AND SIDE EFFECTS OF OPIOID USE?  Prescription opioids carry serious risks of addiction and overdose, especially with prolonged use. An opioid overdose, often marked by slowed breathing can cause sudden death. The use of prescription opioids can have a number of side effects as well, even when taken as directed:      Tolerance - meaning you might need to take more of a medication for the same pain relief    Physical dependence - meaning you have symptoms of withdrawal when a medication is stopped    Increased sensitivity to pain    Constipation    Nausea, vomiting, and dry mouth    Sleepiness and dizziness    Confusion    Depression    Low levels of testosterone that can result in lower sex drive, energy, and strength    Itching and sweating    RISKS ARE GREATER WITH:    History of drug misuse, substance use disorder, or overdose    Mental health conditions (such as depression or anxiety)    Sleep apnea    Older age (65 years or older)    Pregnancy    Avoid alcohol while taking prescription opioids.   Also, unless specifically advised by your health care provider, medications to avoid include:    Benzodiazepines (such as Xanax or Valium)    Muscle relaxants (such as Soma or Flexeril)    Hypnotics (such as Ambien or Lunesta)    Other prescription  opioids    KNOW YOUR OPTIONS:  Talk to your health care provider about ways to manage your pain that do not involve prescription opioids. Some of these options may actually work better and have fewer risks and side effects:    Pain relievers such as acetaminophen, ibuprofen, and naproxen    Some medications that are also used for depression or seizures    Physical therapy and exercise    Cognitive behavioral therapy, a psychological, goal-directed approach, in which patients learn how to modify physical, behavioral, and emotional triggers of pain and stress    IF YOU ARE PRESCRIBED OPIOIDS FOR PAIN:    Never take opioids in greater amounts or more often than prescribed    Follow up with your primary health care provider and work together to create a plan on how to manage your pain.    Talk about ways to help manage your pain that do not involve prescription opioids    Talk about all concerns and side effects    Help prevent misuse and abuse    Never sell or share prescription opioids    Never use another person's prescription opioids    Store prescription opioids in a secure place and out of reach of others (this may include visitors, children, friends, and family)    Visit www.cdc.gov/drugoverdose to learn about risks of opioid abuse and overdose    If you believe you may be struggling with addiction, tell your health care provider and ask for guidance or call [facility]'s National Helpline at 8-994-450-HELP    LEARN MORE / www.cdc.gov/drugoverdose/prescribing/guideline.html    Safely dispose of unused prescription opioids: Find your local drug take-back programs and more information about the importance of safe disposal at www.doseofreality.mn.gov             Medication List: This is a list of all your medications and when to take them. Check marks below indicate your daily home schedule. Keep this list as a reference.      Medications           Morning Afternoon Evening Bedtime As Needed    acetaminophen 325 MG  tablet   Commonly known as:  TYLENOL   Take 2 tablets (650 mg) by mouth every 4 hours as needed for other (mild pain)                                   calcium carbonate 1250 MG tablet   Commonly known as:  OS-PACO 500 mg [place]. [place]   Take 2 tablets by mouth daily                                   hydrocortisone 2.5 % cream   Apply very small amount to eyelids up to twice daily                                      ibuprofen 600 MG tablet   Commonly known as:  ADVIL/MOTRIN   Take 1 tablet (600 mg) by mouth every 6 hours as needed for pain (mild)                                   loratadine 10 MG tablet   Commonly known as:  CLARITIN   ONE DAILY                                   melatonin 1 MG/ML Liqd liquid   Take 3 mg by mouth nightly as needed for sleep                                   multivitamin per tablet   Take 1 tablet by mouth daily.                                   omega 3 1000 MG Caps   once daily                                   oxyCODONE IR 5 MG tablet   Commonly known as:  ROXICODONE   Take 1-2 tablets (5-10 mg) by mouth every 3 hours as needed for pain or other (Moderate to Severe)   Last time this was given:  5 mg on 2/14/2018  6:23 AM                                   RANITIDINE HCL PO   Take 150 mg by mouth At Bedtime                                   vitamin D3 2000 UNITS Caps   1 capsule daily - takes only intermittently                                             More Information        Patient Education    Scopolamine Hydrobromide Ophthalmic drops, solution    Scopolamine Hydrobromide Oral tablet    Scopolamine Transdermal patch - 72 hour  Scopolamine Transdermal patch - 72 hour  What is this medicine?  SCOPOLAMINE (skoe DANY a meen) is used to prevent nausea and vomiting caused by motion sickness, anesthesia and surgery.  This medicine may be used for other purposes; ask your health care provider or pharmacist if you have questions.  What should I tell my health care provider before I take this  medicine?  They need to know if you have any of these conditions:    glaucoma    kidney or liver disease    an unusual or allergic reaction (especially skin allergy) to scopolamine, atropine, other medicines, foods, dyes, or preservatives    pregnant or trying to get pregnant    breast-feeding  How should I use this medicine?  This medicine is for external use only. Follow the directions on the prescription label. One patch contains enough medicine to prevent motion sickness for up to 3 days. Apply the patch at least 4 hours before you need it and only wear one disc at a time. Choose an area behind the ear, that is clean, dry, hairless and free from any cuts or irritation. Wipe the area with a clean dry tissue. Peel off the plastic backing of the skin patch, trying not to touch the adhesive side with your hands. Do not cut the patches. Firmly apply to the area you have chosen, with the metallic side of the patch to the skin and the tan-colored side showing. Once firmly in place, wash your hands well with soap and water. Remove the disc after 3 days, or sooner if you no longer need it. After removing the patch, wash your hands and the area behind your ear thoroughly with soap and water. The patch will still contain some medicine after use. To avoid accidental contact or ingestion by children or pets, fold the used patch in half with the sticky side together and throw away in the trash out of the reach of children and pets. If you need to use a second patch after you remove the first, place it behind the other ear.  Talk to your pediatrician regarding the use of this medicine in children. Special care may be needed.  Overdosage: If you think you have taken too much of this medicine contact a poison control center or emergency room at once.  NOTE: This medicine is only for you. Do not share this medicine with others.  What if I miss a dose?  Make sure you apply the patch at least 4 hours before you need it. You can  apply it the night before traveling.  What may interact with this medicine?    benztropine    bethanechol    medicines for anxiety or sleeping problems like diazepam or temazepam    medicines for hay fever and other allergies    medicines for mental depression    muscle relaxants  This list may not describe all possible interactions. Give your health care provider a list of all the medicines, herbs, non-prescription drugs, or dietary supplements you use. Also tell them if you smoke, drink alcohol, or use illegal drugs. Some items may interact with your medicine.  What should I watch for while using this medicine?  Keep the patch dry, if possible, to prevent it from falling off. Limited contact with water, however, as in bathing or swimming, will not affect the system. If the patch falls off, throw it away and put a new one behind the other ear.  You may get drowsy or dizzy. Do not drive, use machinery, or do anything that needs mental alertness until you know how this medicine affects you. Do not stand or sit up quickly, especially if you are an older patient. This reduces the risk of dizzy or fainting spells. Alcohol may interfere with the effect of this medicine. Avoid alcoholic drinks.  Your mouth may get dry. Chewing sugarless gum or sucking hard candy, and drinking plenty of water may help. Contact your doctor if the problem does not go away or is severe.  This medicine may cause dry eyes and blurred vision. If you wear contact lenses you may feel some discomfort. Lubricating drops may help. See your eye doctor if the problem does not go away or is severe.  If you are going to have a magnetic resonance imaging (MRI) procedure, tell your MRI technician if you have this patch on your body. It must be removed before a MRI.  What side effects may I notice from receiving this medicine?  Side effects that you should report to your doctor or health care professional as soon as possible:    agitation, nervousness,  confusion    blurred vision and other eye problems    dizziness, drowsiness    eye pain or redness in the whites of the eye    hallucinations    pain or difficulty passing urine    skin rash, itching    vomiting  Side effects that usually do not require medical attention (report to your doctor or health care professional if they continue or are bothersome):    headache    nausea  This list may not describe all possible side effects. Call your doctor for medical advice about side effects. You may report side effects to FDA at 0-988-ORU-3723.  Where should I keep my medicine?  Keep out of the reach of children.  Store at room temperature between 20 and 25 degrees C (68 and 77 degrees F). Throw away any unused medicine after the expiration date. When you remove a patch, fold it and throw it in the trash as described above.  NOTE: This sheet is a summary. It may not cover all possible information. If you have questions about this medicine, talk to your doctor, pharmacist, or health care provider.  NOTE:This sheet is a summary. It may not cover all possible information. If you have questions about this medicine, talk to your doctor, pharmacist, or health care provider. Copyright  2016 Gold Standard

## 2018-02-13 NOTE — PROGRESS NOTES
Preop Diagnosis: symptomatic cystocele, rectocele enterocele, uterine desensus, mixed urinary incontinence    Post-Op Diagnosis: same    Procedure: Exam under anesthesia,, DaVinci Robotic supracervical hysterectomy, DaVinci Robotic bilateral salpingo-opherectomy, DaVinci Robotic sacrocolpopexy  and cystoscopy    Surgeon: MICHAEL PATE MD, Assistant: He SAM    EBL: 20 cc    Anesthesia: General and Local    Path: uterus fallopian tubes and ovaries    Complications: no problems reported    Findings: see dictated operative note for full details        MICHAEL PATE MD  12:37 PM  2/13/2018

## 2018-02-13 NOTE — OP NOTE
Procedure Date: 02/13/2018      PREOPERATIVE DIAGNOSES:  Symptomatic cystocele, rectocele, enterocele, uterine descensus, mixed urinary incontinence.      POSTOPERATIVE DIAGNOSES:  Symptomatic cystocele, rectocele, enterocele, uterine descensus, mixed urinary incontinence.      PROCEDURE:  Exam under anesthesia, da Tim robotic supracervical hysterectomy, bilateral salpingo-oophorectomy, sacral colpopexy, cystoscopy.      SURGEON:  North Cai MD      ASSISTANT:  Shiloh Cutler CST      Preoperatively, Dr. Stephen Muniz urologist placed bilateral ureteral stents.  Please see his operative note for full details.      HISTORY OF PRESENT ILLNESS:  The patient is a 62-year-old white female, para 2-0-0-2, menopause at age 52, not on hormone replacement therapy, who I was asked to see by Dr. Elmore and Dr. Story for evaluation of pelvic organ prolapse with urinary incontinence.  The patient states that in October 2017 while in the shower she noticed something bulging from the vagina.  She presented to Dr. Elmore for evaluation and a large cystocele and uterine descensus was noted.  The patient also stated that for the last couple of years, she has had leakage of urine with laughing, lifting, coughing and straining as well as some symptoms of urgency incontinence.  The patient does drink 2 cans of Diet Mountain Dew or Diet Dr. Pepper daily and also has 8-10 water bottles daily.  We reviewed these findings with the patient performed a urodynamic study preoperatively, discussed at length the risks, benefits and alternative forms of therapy on multiple occasions and we made a decision to proceed with the above procedure.  I think she has a good understanding of the nature of the problem, the nature of the procedure, the risks, the benefits, the alternatives.  All of her questions were answered and informed consent was obtained.      OPERATIVE FINDINGS:  Exam under anesthesia:  External genitalia, BUS without  lesions.  There is a grade 3 cystocele present with hypermobility of the urethrovesical angle.  There was a grade 2 uterine descensus with enterocele and grade 1-2 rectocele present, vaginal epithelium was otherwise unremarkable.  The bimanual exam revealed the uterus to be small, firm, parous, mobile, adnexa were without masses or enlargement.  Rectovaginal exam revealed no evidence of any uterosacral ligament nodularity.  Details of the cystoscopy:  Please note, the cystoscopy was performed at the completion of the procedure.  Dr. Stephen Muniz had placed bilateral ureteral stents preoperatively.  The previously placed catheter and ureteral stents had been removed.  The urethra was within normal limits.  The trigone of the bladder, the right and left ureteral orifices were seen and were within normal limits.  There was no evidence of any perforation, foreign body or compromise to the bladder lumen.  I saw no neovascularization, mosaicism or signs of malignancy.  I did not see any trabeculations of significance.  The inferior, medial, lateral, superior, anterior apical portions of bladder mucosa were all within normal limits.  There was bilateral efflux of urine noted.  Details of the laparoscopic portion of the procedure:  The left fallopian tube and ovary were within normal limits.  The left pelvic sidewall was within normal limits.  I could see the ureter clearly demarcated with a stent within the ureteral canal.  The anterior surface uterus and bladder flap were within normal limits.  The left and right round ligaments were within normal limits.  The right fallopian tube and fimbriated end was within normal limits.  The right ovary was free of any evidence of any endometriosis, adhesions, scar tissue, excrescences or signs of malignancy.  The right lateral pelvic sidewall is within normal limits.  The posterior aspect of the uterus, the cul-de-sac, the right and left uterosacral ligaments were within normal  limits.  The left ovary was within normal limits.  I did not see any evidence of any excrescences, endometriosis, adhesions or signs of malignancy.  The appendix was within normal limits.  The liver edge was not seen.  The gallbladder was not clearly visualized.      DETAILS OF PROCEDURE:  After obtaining informed consent, the patient was taken to the operating room where a general anesthetic with endotracheal intubation was performed.  Pneumatic stockings had been placed preoperatively.  The patient was placed in dorsal lithotomy position in [facility] and positioned in such a manner that the legs were approximately 30 degrees to the horizontal.  An exam under anesthesia had been performed.  Following this, the patient was prepped and draped in the usual sterile fashion.  Dr. Muniz from the Urology Department entered into the surgical suite and performed cystoscopy with bilateral ureteral stent placement, please see his note for full details.  At the completion of the stent placement, the patient was reprepped and redraped in the usual sterile fashion.  A hard stop was performed.  With the patient positioned in the dorsal lithotomy position with the legs approximately 30 degrees to the horizontal, pneumatic stockings had been placed.  The Veronica catheter and stents that had been placed by Dr. Muniz earlier were draining clear urine.  The patient had received Pyridium preoperatively.  A bivalve speculum was placed in the vagina, the cervix visualized, grasped with a tenaculum and the endocervical canal was dilated to a #6 Hegar dilator.  A Barbara cannula was placed within the endocervical canal and into the endometrial cavity.  The endometrial cavity was sounded to 6 cm.  The speculum was removed and the instruments and Veronica catheter were draped sterilely.  The pelvic floor relaxation parameters were noted as mentioned above.  The subumbilical ligaments were identified, injected with approximately 3 mL of  0.5% Marcaine with epinephrine, grasped with Norfolk clamps, tented, and a Veress needle placed through the end of the abdominal cavity through the umbilicus without difficulty.  Proper position was confirmed with free flow of saline technique.  The abdomen was inflated with 3 liters of CO2 gas.  Opening pressure was less than 10 mmHg.  With the abdomen adequately distended, we measured 23 cm from the symphysis to just superior to the umbilicus.  A linear incision was made after infiltrating the skin and subcutaneous tissues with approximately 3 mL of 0.5% Marcaine with epinephrine solution.  While tenting the abdominal wall, a nonbladed trocar was then placed under direct visualization.  This was a da Tim 8-mm laparoscopic trocar.  A second 8-mm nonbladed trocar was placed in the left upper quadrant 2 fingerbreadths inferior to the costal margin lateral to the rectus abdominis muscles after infiltrating the skin and fascia with approximately 2-3 mL of 0.5% Marcaine solution, the trocar again was placed under direct visualization.  We then went 10 cm medial, 30 degrees inferior and placed a second 8-mm nonbladed trocar under direct visualization to the left of midline after infiltrating the skin and subcutaneous tissues with approximately 2-3 mL of 0.5% Marcaine solution.  At this point, we placed an 8-mm nonbladed trocar in the right lateral abdominal wall approximately at the level of the umbilicus after infiltrating the skin and fascia with approximately 3 mL of 0.5% Marcaine solution.  A 12-mm trocar was placed midway between the right lateral port and the supraumbilical laparoscopic port, the AirSeal trocar was used.  A total of approximately 18 mL of anesthetic was utilized.  The patient was placed in approximately 23 degrees of Trendelenburg.  The da Tim robot was brought into the operative field and docked in a parallel docking manner.  A ProGrasp forcep was placed in the #3 port, a PK forcep in the #2  port and monopolar scissors in the #1 port.  With proper positioning, I then progressed to the console.  An inspection of the pelvis was made with the above findings.  Following this, the uterus was placed on superior medial traction.  The distal end of the right fallopian tube was identified, grasped with the forceps and the right infundibulopelvic ligament was identified, crossclamped with the PK forceps, cauterized and divided with monopolar scissors.  The right round ligament was identified, crossclamped with the PK forceps, cauterized and divided with monopolar scissors.  The remaining portions of the cardinal and broad ligaments on the right side were then taken down in successive bites.  The pedicles cauterized with the PK forceps and divided under direct visualization with the monopolar scissors.  There was no injury to other intra-abdominal or retroperitoneal structures.  The vesicouterine reflection was identified and incised in a right to left manner and the bladder dissected meticulously off the lower uterine segment and proximal vagina for a distance of approximately 6.5 cm.  The posterior peritoneal reflection was then identified and incised posteriorly.        Attention was then turned to the left adnexa.  The distal end of the left fallopian tube was identified and gentle superior medial traction applied, identifying the left infundibulopelvic ligament.  The left infundibulopelvic ligament was identified, crossclamped with the PK forceps, cauterized and divided with the monopolar scissors.  The left round ligament was identified, cauterized with the PK forceps and divided with monopolar scissors.  The remaining portions of the broad and cardinal ligament on the left side were then taken down in successive bites, the pedicles cauterized with the PK forceps and divided with the monopolar scissors without difficulty or complication.  Again, there was no injury to other intra-abdominal or retroperitoneal  structures.  With the bladder adequately deflected inferiorly distally, the supracervical hysterectomy was then performed using the monopolar scissors without difficulty.  An Endopouch bag was then brought into the operative field and the uterus, fallopian tubes and ovaries were then placed in the Endopouch bag and the pouch placed in the left upper quadrant.  At this point, the uterine manipulator was removed and the Gerardo surgical positioning device was placed using a medium probe in such a manner that we were able to visualize the cul-de-sac.  The PK forceps and monopolar scissors were then used to tent and incise the posterior vaginal peritoneal reflection.  This was incised and dissected laterally and the posterior cul-de-sac and rectovaginal space were entered without difficulty or complication.  The dissection was carried down for a distance of approximately 7 cm from the cervical stump.  With the rectum deflected to the patient's left side the peritoneum was identified over the sacral promontory, grasped with the PK forceps, incised with monopolar scissors and the right lateral pelvic sidewall was opened again with traction, countertraction and meticulous dissection without injury to other intra-abdominal or retroperitoneal structures.  This dissection was carried down to the level of posterior rectovaginal space.  Subperitoneal fat was dissected off the sacral promontory and appropriate ligaments were visualized.  At this point, the monopolar scissors and PK forceps were removed and robotic needle drivers were then placed.  The cervical stump was oversewn with a running suture of 2-0 V-Loc material in a left-to-right manner and the suture was then brought back to the midline.  This resulted in good closure of the cervical stump with good hemostasis.  The Coloplast mesh was then brought into the operative field.  The anterior and posterior leaflets were trimmed for a distance of approximately 6 cm from the  bifurcation.  The bifurcation was then attached to the apex of the vagina and cervical stump with 3 interrupted sutures of 2-0 PDS material.  The posterior leaflet was then attached to the posterior cervical stump and posterior vaginal epithelium with 6 additional interrupted sutures of 2-0 PDS material for a total of 9 sutures placed posteriorly.  The mesh was lying down flat and secured securely to these tissues.  The mesh was then laid down flat over the anterior cervical stump and vaginal epithelium and reapproximated with 9 interrupted sutures of 2-0 PDS material.  Again, the mesh laid flat and was secure following the reapproximation.  Following this, reperitonealization of the pelvis was started.  We used a 9 inch suture of 2-0 V-Loc material beginning at the left lateral margin.  We incorporated the anterior vesicouterine reflection, peritoneal reflection posteriorly as well as the medial peritoneal reflection from the right pelvic sidewall.  With this secured, we then continued this suture incorporating the anterior vesicouterine reflection and peritoneal reflection to the right lateral margin.  We then incorporated the anterior peritoneal leaflet, posterior peritoneal leaflet and the suture then again was brought down to the lateral pelvic reflection from the right pelvic sidewall.  This was incorporated into the stitch.  The stitch was then brought over the proximal arm of the mesh and incorporated to the medial leaflet of the pelvic sidewall peritoneum.  The pursestring was then secured.  This resulted in excellent coverage of the Y arms of the Coloplast mesh.  The Gerardo's surgical manipulator with a medium probe that had placed vaginally was then set to place the apex of the vagina back to a physiologic DeLancey level 1 level of support.  Tension on the proximal arm of the Y-shaped mesh was then set and the mesh attached to the sacral promontory and ligaments with 2 double throw sutures of 2-0 [place]-Brian  material in a double throw sterile fashion.  Excess mesh was trimmed, this resulted in a secure attachment.  The remainder of the right lateral pelvic sidewall was reapproximated with a running suture of 2-0 V-Loc material that had been used earlier and this resulted in complete coverage of the mesh.  There was no injury to other intra-abdominal retroperitoneal structures.  We could see ureteral vermiculation bilaterally at the completion of the pelvic reperitonealization.        At this point, we proceeded to remove the AirSeal port from the right upper quadrant and enlarge the incision to approximately 2.5 cm.  The uterus that had been placed in the bag was then removed with the fallopian tubes and ovaries without difficulty.  There was no rupture of the bag during this process.  The right upper quadrant abdominal wall incision that had been enlarged was then closed with a running suture of 0 Vicryl and reinforced with an interrupted suture of 0 Vicryl.  This resulted in an airtight reapproximation with no palpable fascial defects.  The skin incision was closed with a 4-0 Monocryl in a subcuticular fashion and 8 mL of 0.5% Marcaine with epinephrine were placed in the incision for additional analgesia.  At this point, the pelvis was irrigated with warm saline.  Sponge, needle and instrument counts had all been checked and were documented to be correct x2.  Hemostasis was checked and found to be acceptable.  The robot was then undocked and the laparoscopic ports were closed with interrupted sutures of 4-0 Monocryl and Dermabond.  The abdomen had been deflated of CO2 gas prior to the closure.  The Gerardo surgical positioning device was then removed.  Examination revealed excellent apical anterior and posterior compartment support.  The hypermobility of the urethrovesical angle was resolved.  In light of excellent pelvic floor support, I elected to not proceed with the tension free sling procedure.  In light of her  excellent support, my fear was potentially aggravating her underlying urge incontinence procedure.        At this point, the Veronica catheter and stents have been removed and cystoscopy was performed with the above findings.  At the completion of the cystoscopy, a new Veronica catheter was placed.  We used a 70-degree 20-Kazakh scope with sterile water as our distending medium.  At the completion of the procedure, a bimanual exam was performed revealing excellent DeLancey 1 level of apical compartment support with excellent anterior and posterior compartment support.  There was no evidence of any compromise to the rectal lumen.  Again, at the completion of the procedure, we documented the sponge, needle and instrument counts had all been checked and were correct x2, that hemostasis was acceptable.  At the completion of the procedure, the patient was taken out of dorsal lithotomy position, awoken and returned to the recovery area in good condition.  Estimated blood loss approximately 20 mL.  There were no difficulties or complications.         MICHAEL PATE MD             D: 2018   T: 2018   MT: SALUD      Name:     DON PATTEN   MRN:      [id number]        Account:        [id number]   :      1955           Procedure Date: 2018      Document: [id number]       cc: Anthony Story MD

## 2018-02-13 NOTE — PLAN OF CARE
Problem: Patient Care Overview  Goal: Plan of Care/Patient Progress Review  PRIMARY DIAGNOSIS: Davinci Robotic supracervical hysterectomy with BSL  OUTPATIENT/OBSERVATION GOALS TO BE MET BEFORE DISCHARGE:  1. Stable vital signs Yes  2. Tolerating diet:Yes  3. Pain controlled with oral pain medications:  Yes  4. Positive bowel sounds:  Yes  5. Voiding without difficulty:  Butterfield to be removed  6. Able to ambulate:  No-hasn't got out of bed  7. Provider specific discharge goals met:  Not yet-needs butterfield removed, advance diet, and needs to walk     Discharge Planner Nurse   Safe discharge environment identified: Yes  Barriers to discharge: Not once medically cleared       Entered by: Dayna Magallanes 02/13/2018 5:30 PM     Please review provider order for any additional goals.   Nurse to notify provider when observation goals have been met and patient is ready for discharge.

## 2018-02-13 NOTE — PHARMACY-ADMISSION MEDICATION HISTORY
Medication reconciliation/reorder completed as per pre-admit RN Edwina Moore with no pharmacy changes    Prior to Admission medications    Medication Sig Last Dose Taking? Auth Provider   acetaminophen (TYLENOL) 325 MG tablet Take 2 tablets (650 mg) by mouth every 4 hours as needed for other (mild pain)  Yes North Cai MD   ibuprofen (ADVIL/MOTRIN) 600 MG tablet Take 1 tablet (600 mg) by mouth every 6 hours as needed for pain (mild)  Yes North Cai MD   oxyCODONE IR (ROXICODONE) 5 MG tablet Take 1-2 tablets (5-10 mg) by mouth every 3 hours as needed for pain or other (Moderate to Severe)  Yes North Cai MD   melatonin (MELATONIN) 1 MG/ML LIQD liquid Take 3 mg by mouth nightly as needed for sleep 1 week ago Yes Reported, Patient   RANITIDINE HCL PO Take 150 mg by mouth At Bedtime 1 week ago Yes Reported, Patient   Cholecalciferol (VITAMIN D3) 2000 UNITS CAPS 1 capsule daily - takes only intermittently 1 week ago Yes Phuong Story MD   hydrocortisone 2.5 % cream Apply very small amount to eyelids up to twice daily  Patient taking differently: as needed Apply very small amount to eyelids up to twice daily Past Week at Unknown time Yes Phuong Story MD   Multiple Vitamin (MULTIVITAMIN) per tablet Take 1 tablet by mouth daily. 1 week ago Yes Phuong Story MD   OMEGA 3 1000 MG PO CAPS once daily 1 week ago Yes Jose Acosta PA-C   LORATADINE 10 MG OR TABS ONE DAILY 2/10/2018 Yes Phuong Story MD   calcium carbonate (OS-PACO 500 MG Kiana. CA) 1250 MG tablet Take 2 tablets by mouth daily Unknown at Unknown time  Reported, Patient

## 2018-02-14 VITALS
RESPIRATION RATE: 16 BRPM | OXYGEN SATURATION: 93 % | SYSTOLIC BLOOD PRESSURE: 83 MMHG | HEIGHT: 62 IN | WEIGHT: 139.5 LBS | DIASTOLIC BLOOD PRESSURE: 47 MMHG | TEMPERATURE: 97 F | BODY MASS INDEX: 25.67 KG/M2

## 2018-02-14 LAB
COPATH REPORT: NORMAL
CREAT SERPL-MCNC: 0.81 MG/DL (ref 0.52–1.04)
GFR SERPL CREATININE-BSD FRML MDRD: 72 ML/MIN/1.7M2
GLUCOSE BLDC GLUCOMTR-MCNC: 102 MG/DL (ref 70–99)
GLUCOSE SERPL-MCNC: 119 MG/DL (ref 70–99)
HGB BLD-MCNC: 11.4 G/DL (ref 11.7–15.7)

## 2018-02-14 PROCEDURE — 85018 HEMOGLOBIN: CPT | Performed by: OBSTETRICS & GYNECOLOGY

## 2018-02-14 PROCEDURE — 25000132 ZZH RX MED GY IP 250 OP 250 PS 637: Performed by: OBSTETRICS & GYNECOLOGY

## 2018-02-14 PROCEDURE — 36415 COLL VENOUS BLD VENIPUNCTURE: CPT | Performed by: OBSTETRICS & GYNECOLOGY

## 2018-02-14 PROCEDURE — 82565 ASSAY OF CREATININE: CPT | Performed by: OBSTETRICS & GYNECOLOGY

## 2018-02-14 PROCEDURE — 82947 ASSAY GLUCOSE BLOOD QUANT: CPT | Performed by: OBSTETRICS & GYNECOLOGY

## 2018-02-14 PROCEDURE — 00000146 ZZHCL STATISTIC GLUCOSE BY METER IP

## 2018-02-14 PROCEDURE — G0378 HOSPITAL OBSERVATION PER HR: HCPCS

## 2018-02-14 PROCEDURE — 25000128 H RX IP 250 OP 636: Performed by: OBSTETRICS & GYNECOLOGY

## 2018-02-14 RX ORDER — CALCIUM CARBONATE 500(1250)
2500 TABLET ORAL DAILY
Status: DISCONTINUED | OUTPATIENT
Start: 2018-02-14 | End: 2018-02-14 | Stop reason: HOSPADM

## 2018-02-14 RX ADMIN — KETOROLAC TROMETHAMINE 30 MG: 30 INJECTION, SOLUTION INTRAMUSCULAR at 04:54

## 2018-02-14 RX ADMIN — OXYCODONE HYDROCHLORIDE 5 MG: 5 TABLET ORAL at 06:23

## 2018-02-14 ASSESSMENT — PAIN DESCRIPTION - DESCRIPTORS: DESCRIPTORS: ACHING

## 2018-02-14 NOTE — PLAN OF CARE
Problem: Patient Care Overview  Goal: Plan of Care/Patient Progress Review  Outcome: Improving  PRIMARY DIAGNOSIS: ROBOTIC SUPRACERVICAL HYSTERECTOMY WITH BSO, SACROCOLPOPEXY AND CYSTOSCOPY  OUTPATIENT/OBSERVATION GOALS TO BE MET BEFORE DISCHARGE:  1. ADLs back to baseline: Yes    2. Activity and level of assistance: Ambulating independently.    3. Pain status: Improved-controlled with oral pain medications.    4. Return to near baseline physical activity: Yes     Discharge Planner Nurse   Safe discharge environment identified: Yes  Barriers to discharge: No       Entered by: Dory Davenport 02/14/2018 10:00 AM    A/Ox4, independent/SBA, hypotensive, MD aware, butterfield cath D/Mark on previous shift, due to void, advanced to regular diet, tolerating. Plan is discharge to home later today. POC reviewed with patient and  at bedside, questions answered.

## 2018-02-14 NOTE — PLAN OF CARE
Problem: Patient Care Overview  Goal: Discharge Needs Assessment  Outcome: No Change  PRIMARY DIAGNOSIS: ROBOTIC SUPRACERVICAL HYSTERECTOMY WITH BSO, SACROCOLPOPEXY AND CYSTOSCOPY  OUTPATIENT/OBSERVATION GOALS TO BE MET BEFORE DISCHARGE:  1. Stable vital signs Yes  2. Tolerating diet:Yes  3. Pain controlled with oral pain medications:  Yes  4. Positive bowel sounds:  Yes  5. Voiding without difficulty:  Will remove butterfield, due to void  6. Able to ambulate:  Yes  7. Provider specific discharge goals met:  No, still on oxygen and due to void    Discharge Planner Nurse   Safe discharge environment identified: Yes  Barriers to discharge: Yes       Entered by: Jared Acuna 02/14/2018 4:43 AM     Please review provider order for any additional goals.   Nurse to notify provider when observation goals have been met and patient is ready for discharge.    Patient resting comfortably. Denies pain when lying in bed. Vital signs stable. Capnography on. Alert/oriented x4. Tolerating diet without difficulty. Bowel sounds positive, negative flatus. Incision intact with liquid bandage.

## 2018-02-14 NOTE — PLAN OF CARE
Problem: Patient Care Overview  Goal: Plan of Care/Patient Progress Review  PRIMARY DIAGNOSIS: Robotic Hys. With BSO  OUTPATIENT/OBSERVATION GOALS TO BE MET BEFORE DISCHARGE:  1. Stable vital signs Yes  2. Tolerating diet:Yes  3. Pain controlled with oral pain medications:  Yes  4. Positive bowel sounds:  Yes  5. Voiding without difficulty:  Veronica in place  6. Able to ambulate:  Yes  7. Provider specific discharge goals met:  No    Discharge Planner Nurse   Safe discharge environment identified: Yes  Barriers to discharge: No       Entered by: Dayna Magallanes 02/13/2018 8:38 PM     Please review provider order for any additional goals.   Nurse to notify provider when observation goals have been met and patient is ready for discharge.

## 2018-02-14 NOTE — PLAN OF CARE
Problem: Patient Care Overview  Goal: Discharge Needs Assessment  Outcome: No Change  PRIMARY DIAGNOSIS: robotic supracervical hysterectomy, bilateral salpingo-oophorectomy, sacral colpopexy, cystoscopy  OUTPATIENT/OBSERVATION GOALS TO BE MET BEFORE DISCHARGE:  1. Stable vital signs Yes  2. Tolerating diet:Yes  3. Pain controlled with oral pain medications:  Yes  4. Positive bowel sounds:  Yes  5. Voiding without difficulty:  Veronica to be removed in AM  6. Able to ambulate:  Yes  7. Provider specific discharge goals met:  No, awaiting voiding trial, need to wean oxygen.      Discharge Planner Nurse   Safe discharge environment identified: Yes  Barriers to discharge: Yes       Entered by: Jared Acuna 02/14/2018 12:21 AM     Please review provider order for any additional goals.   Nurse to notify provider when observation goals have been met and patient is ready for discharge.    Patient resting comfortably in bed. Incision intact with liquid bandage. Vital signs stable. Capnography on. Continues to need 2 LPM via NC. Minimal complaints of pain. Tolerating clear liquid diet and crackers without difficulty. Ambulating with assist of 1. Continue POC.

## 2018-02-14 NOTE — PROGRESS NOTES
Gynecology Post-Op / Progress Note         Assessment and Plan:    Assessment:   Post-operative day #1  Exam under anesthesia, da Tim robotic supracervical hysterectomy, bilateral salpingo-oophorectomy, sacral colpopexy, cystoscopy.      Doing well.  Clean wound without signs of infection.  Normal healing wound.  No immediate surgical complications identified.  No excessive bleeding  Pain well-controlled.      Plan:   Orders Placed This Encounter   Procedures     Surgical pathology exam     Creatinine     Glucose     Hemoglobin     Outpatient in a Bed discharge goals     Vital signs     CMS     Discontinue IV and Saline Lock      Pulse oximetry nursing     Capnography nursing     Peripheral IV catheter     Glucose monitor nursing POCT     Notify Provider to consider change to Observation status     Notify Provider to consider change to Observation or Inpatient status     Notify Provider     Notify Provider     Turn cough deep breathe     Incentive spirometry nursing     Discharge planning     Discharge planning     Discharge planning     Ice to Affected Area     Activity: Up ad leonid     NO Bath Tub     Indwelling urinary catheter (Veronica)      Discontinue indwelling urinary catheter (Veronica)     Bladder scan and straight cath for urine     No lifting      Diet Instructions     Shower     Dressing     Ice to affected area     Discharge Instructions     Full Code     Oxygen: Nasal cannula, Oxygen mask     [place] to Outpatient -  in a Bed     [place] to Observation     Transfer patient     Discharge patient     Pneumatic Compression Device     Pneumatic Compression Device     T-PUMP       d/c home later today when daniela adequate po and voiding without concerns  D/c instructions and sx of concern discussed pt to call       Interval History:   Doing well.  Continues to improve.  Pain is well-controlled.  No fevers.            Significant Problems:      Past Medical History:   Diagnosis Date     Allergic  "rhinitis, cause unspecified      Arthritis      Dysphagia     had esophagram 12/07 - tertiary contractions of esoph with some retention, EGD showed gastritis, duodenitis and esophagitis ,pt didn't start a PPI       Fatigue      Gastroesophageal reflux disease      H/O YISEL I     s/p culpo with bx x 2 and cryotherapy - ok for paps q3yrs starting 2012     Helicobacter pylori (H. pylori) infection in 2009     Hyperlipidemia LDL goal < 130 2/4/2005    lipitor \"attacked my muscles\" (Hx fibromyalgia); [id number] failed to lower lipids enough&=myalgias also , lovastatin 20mg = myalgias      Lyme disease 8/20/2010     Myalgia and myositis, unspecified     fibromyalgia - doing an otc human growth  hormone      OBESITY NOS- hx of      lost 30+ pounds since 2002 breast reduction      Obesity- worsening fibromyalgia, back pain, pcos  12/1/2009     Polycystic ovaries      Postmenopausal since age 52      Shingles              Review of Systems:    The patient denies any chest pain, shortness of breath, excessive pain, fever, chills, purulent drainage from the wound, nausea or vomiting.          Medications:     All medications related to the patient's surgery have been reviewed  Current Facility-Administered Medications   Medication     calcium carbonate (OS-PACO 500 mg [place]. Ca) tablet 2,500 mg     naloxone (NARCAN) injection 0.1-0.4 mg     lidocaine 1 % 1 mL     lidocaine (LMX4) kit     sodium chloride (PF) 0.9% PF flush 3 mL     sodium chloride (PF) 0.9% PF flush 3 mL     naloxone (NARCAN) injection 0.1-0.4 mg     HYDROmorphone (PF) (DILAUDID) injection 0.2 mg     ketorolac (TORADOL) injection 30 mg     ibuprofen (ADVIL/MOTRIN) tablet 600 mg     acetaminophen (TYLENOL) tablet 650 mg     ondansetron (ZOFRAN-ODT) ODT tab 4 mg    Or     ondansetron (ZOFRAN) injection 4 mg     oxyCODONE IR (ROXICODONE) tablet 5-10 mg             Physical Exam:   Vitals were reviewed  All vitals stable  Temp: 97.2  F (36.2  C) Temp src: Oral " BP: (!) 89/51   Heart Rate: 65 Resp: 16 SpO2: 97 % O2 Device: Nasal cannula Oxygen Delivery: 2 LPM  Wound clean and dry with minimal or no drainage.  Surrounding skin with minimal erythema.          Data:     All laboratory data related to this surgery reviewed  Hemoglobin   Date Value Ref Range Status   02/14/2018 11.4 (L) 11.7 - 15.7 g/dL Final   02/10/2018 13.4 11.7 - 15.7 g/dL Final   09/18/2017 13.7 11.7 - 15.7 g/dL Final   06/13/2016 13.6 11.7 - 15.7 g/dL Final   08/24/2015 13.8 11.7 - 15.7 g/dL Final     No imaging studies have been ordered    North Cai MD

## 2018-02-14 NOTE — PROVIDER NOTIFICATION
Dr. Cai notified for stronger PO pain medication. Oxycodone 5-10mg PO Q4hrs PRN ordered verbal phone order.

## 2018-02-14 NOTE — PROGRESS NOTES
Patient's After Visit Summary was reviewed with patient and/or spouse.   Patient verbalized understanding of After Visit Summary, recommended follow up and was given an opportunity to ask questions.   Discharge medications sent home with patient/family: YES, oxycodone    Discharged with spouse    OBSERVATION patient END time: [id number]

## 2018-02-15 ENCOUNTER — TELEPHONE (OUTPATIENT)
Dept: FAMILY MEDICINE | Facility: CLINIC | Age: 63
End: 2018-02-15

## 2018-02-15 NOTE — TELEPHONE ENCOUNTER
ED / Discharge Outreach Protocol    Patient Contact    Attempt # 1    Was call answered?  No.  Left message on voicemail with information to call me back.  Laura Ding RN  [facility]

## 2018-02-15 NOTE — TELEPHONE ENCOUNTER
"Patient returning call    ED/Discharge Protocol    \"Hi, my name is Negra Verdugo, a registered nurse, and I am calling on behalf of Dr. Story's office at [facility].  I am calling to follow up and see how things are going for you after your recent visit.\"    \"I see that you were in the (ER/UC/IP) on [facility] for inpatient hospital stay on 2/14 for post op state, obs after surgery, symptomatic pelvic floor relaxation and urgency.    How are you doing now that you are home?\" Doing pretty good    Is patient experiencing symptoms that may require a hospital visit?  No    Discharge Instructions    \"Let's review your discharge instructions.  What is/are the follow-up recommendations?  Pt. Response: FU with surgeon in 2 weeks    \"Were you instructed to make a follow-up appointment?\"  Pt. Response: No.       \"When you see the provider, I would recommend that you bring your discharge instructions with you.    Medications    \"How many new medications are you on since your hospitalization/ED visit?\"    0-1  \"How many of your current medicines changed (dose, timing, name, etc.) while you were in the hospital/ED visit?\"   0-1  \"Do you have questions about your medications?\"   No  \"Were you newly diagnosed with heart failure, COPD, diabetes or did you have a heart attack?\"   No  For patients on insulin: \"Did you start on insulin in the hospital or did you have your insulin dose changed?\"   No    Medication reconciliation completed? Yes    Was MTM referral placed (*Make sure to put transitions as reason for referral)?   No    Call Summary    \"Do you have any questions or concerns about your condition or care plan at the moment?\"    No  Triage nurse advice given: call with questions/conerns    Patient was in ER 1 in the past year (assess appropriateness of ER visits.)      \"If you have questions or things don't continue to improve, we encourage you contact us through the main clinic number,  [phone].  Even if " "the clinic is not open, triage nurses are available 24/7 to help you.     We would like you to know that our clinic has extended hours (provide information).  We also have urgent care (provide details on closest location and hours/contact info)\"      \"Thank you for your time and take care!\"        Negra Verdugo RN  [facility]    "

## 2018-02-15 NOTE — TELEPHONE ENCOUNTER
Patient discharged from [facility] for inpatient hospital stay on 2/14 for post op state, obs after surgery, symptomatic pelvic floor relaxation and urgency.    Please contact patient to follow up; no appointment scheduled at this time.    ER / IP:  0/0    Care Coordination:  aram Walker

## 2018-02-22 NOTE — DISCHARGE SUMMARY
HISTORY OF PRESENT ILLNESS:  The patient is a 62-year-old white female, para 2-0-0-2, menopause at age 52, not on hormone replacement therapy, who I was asked to see by Dr. Elmore and Dr. Story for evaluation of pelvic organ prolapse with urinary incontinence.   Please see the history of present illness for further details   On February 13, 2018 the patient underwent the following PROCEDURE:  Exam under anesthesia, da Tim robotic supracervical hysterectomy, bilateral salpingo-oophorectomy, sacral colpopexy, cystoscopy.     Preoperatively, Dr. Stephen Muniz urologist placed bilateral ureteral stents.  Please see his operative note for full details.   Please see the operative report for full details.  At the completion of the sacral colpopexy, pelvic floor support and support of urethrovesical angle was such that the hypermobility noted earlier was resolved.  This patient had a history of mixed incontinence with a component of stress incontinence and urgency incontinence.  It was my feeling that in the presence of resolution of the UV angle hypermobility the placing a sling could potentially further aggravate her  urge incontinence  Postoperatively the patient did well.  She was on February 14, 2018 discharge from the outpatient observation unit in good condition.  Discharge instructions have been reviewed with the patient on multiple occasions.  Signs and symptoms of complications were discussed the patient will call should these occur.  I will see her back in the office in 10-14 days, 6-8 weeks and 12 weeks postoperatively.  Written instructions were given    A/P: Symptomatic pelvic floor relaxation with symptomatic cystocele uterine descent rectocele and urinary incontinence.  Finding as noted above.

## 2018-02-27 ENCOUNTER — OFFICE VISIT (OUTPATIENT)
Dept: OBGYN | Facility: CLINIC | Age: 63
End: 2018-02-27
Payer: COMMERCIAL

## 2018-02-27 VITALS — BODY MASS INDEX: 25.97 KG/M2 | SYSTOLIC BLOOD PRESSURE: 102 MMHG | DIASTOLIC BLOOD PRESSURE: 70 MMHG | WEIGHT: 142 LBS

## 2018-02-27 DIAGNOSIS — Z98.890 POSTOPERATIVE STATE: Primary | ICD-10-CM

## 2018-02-27 PROCEDURE — 99024 POSTOP FOLLOW-UP VISIT: CPT | Performed by: OBSTETRICS & GYNECOLOGY

## 2018-02-27 NOTE — MR AVS SNAPSHOT
After Visit Summary   2/27/2018    Edwina Zhou    MRN: [id number]           Patient Information     Date Of Birth          1955        Visit Information        Provider Department      2/27/2018 1:30 PM North Cai MD [facility]        Today's Diagnoses     Postoperative state    -  1      Care Instructions    You can reach your [place] Care Team any time of the day by calling [phone]. This number will put you in touch with the 24 hour nurse line if the clinic is closed.    To contact your OB/GYN Station Coordinator/Surgery Scheduler please call [phone]. This is a direct number for your care team between 8 a.m. and 4 p.m. Monday through Friday.    [facility] is open for your convenience:  Monday through Friday 8 a.m. to 6 p.m.  Closed weekends and all major holidays.            Follow-ups after your visit        Follow-up notes from your care team     Return in about 1 month (around 3/27/2018).      Your next 10 appointments already scheduled     Mar 26, 2018 11:15 AM CDT   SHORT with North Cai MD   [facility] ([facility])    [address]  [facility] [id number] [phone]              Who to contact     If you have questions or need follow up information about today's clinic visit or your schedule please contact [facility] directly at [phone].  Normal or non-critical lab and imaging results will be communicated to you by MyChart, letter or phone within 4 business days after the clinic has received the results. If you do not hear from us within 7 days, please contact the clinic through MyChart or phone. If you have a critical or abnormal lab result, we will notify you by phone as soon as possible.  Submit refill requests through [employer] or call your pharmacy and they will forward the refill request to us. Please allow 3 business days for your refill to be completed.           Additional Information About Your Visit        My[employer]hart Information     [employer] gives you secure access to your electronic health record. If you see a primary care provider, you can also send messages to your care team and make appointments. If you have questions, please call your primary care clinic.  If you do not have a primary care provider, please call [phone] and they will assist you.        Care EveryWhere ID     This is your Care EveryWhere ID. This could be used by other organizations to access your [facility] medical records  [id number]        Your Vitals Were     Last Period BMI (Body Mass Index)                06/03/2010 25.97 kg/m2           Blood Pressure from Last 3 Encounters:   02/27/18 102/70   02/14/18 (!) 83/47   02/10/18 110/66    Weight from Last 3 Encounters:   02/27/18 142 lb (64.4 kg)   02/13/18 139 lb 8 oz (63.3 kg)   02/10/18 139 lb (63 kg)              Today, you had the following     No orders found for display         Today's Medication Changes          These changes are accurate as of 2/27/18 11:59 PM.  If you have any questions, ask your nurse or doctor.               These medicines have changed or have updated prescriptions.        Dose/Directions    hydrocortisone 2.5 % cream   This may have changed:    - when to take this  - reasons to take this  - additional instructions   Used for:  Blepharitis of right upper eyelid, unspecified type        Apply very small amount to eyelids up to twice daily   Quantity:  20 g   Refills:  3                Primary Care Provider Office Phone # Fax #    Phuongsamir Story [phone]       [address] [place] [id number]        Equal Access to Services     [facility]: Hadii ria Madden, waaxda luqadaha, qaybta kaalpapito green. So [facility] [phone].    ATENCIÓN: Si habla español, tiene a ruiz disposición servicios gratuitos de asistencia lingüística. Llame  al [phone].    We comply with applicable federal civil rights laws and [place] laws. We do not discriminate on the basis of race, color, national origin, age, disability, sex, sexual orientation, or gender identity.            Thank you!     Thank you for choosing [facility]  for your care. Our goal is always to provide you with excellent care. Hearing back from our patients is one way we can continue to improve our services. Please take a few minutes to complete the written survey that you may receive in the mail after your visit with us. Thank you!             Your Updated Medication List - Protect others around you: Learn how to safely use, store and throw away your medicines at www.disposemymeds.org.          This list is accurate as of 2/27/18 11:59 PM.  Always use your most recent med list.                   Brand Name Dispense Instructions for use Diagnosis    acetaminophen 325 MG tablet    TYLENOL    100 tablet    Take 2 tablets (650 mg) by mouth every 4 hours as needed for other (mild pain)    Post-operative state       calcium carbonate 1250 MG tablet    OS-PACO 500 mg [place]. Ca     Take 2 tablets by mouth daily        hydrocortisone 2.5 % cream     20 g    Apply very small amount to eyelids up to twice daily    Blepharitis of right upper eyelid, unspecified type       ibuprofen 600 MG tablet    ADVIL/MOTRIN    30 tablet    Take 1 tablet (600 mg) by mouth every 6 hours as needed for pain (mild)    Post-operative state       loratadine 10 MG tablet    CLARITIN    3 MONTHS    ONE DAILY        melatonin 1 MG/ML Liqd liquid      Take 3 mg by mouth nightly as needed for sleep        multivitamin per tablet     100 tablet    Take 1 tablet by mouth daily.    Routine general medical examination at a health care facility       omega 3 1000 MG Caps     90 Cap    once daily    Urticaria, Hives       RANITIDINE HCL PO      Take 150 mg by mouth At Bedtime        vitamin D3 2000 UNITS Caps      1  capsule daily - takes only intermittently    Vitamin D deficiency

## 2018-02-27 NOTE — NURSING NOTE
"Chief Complaint   Patient presents with     Surgical Followup     2018       Initial /70  Wt 142 lb (64.4 kg)  LMP 2010  BMI 25.97 kg/m2 Estimated body mass index is 25.97 kg/(m^2) as calculated from the following:    Height as of 18: 5' 2\" (1.575 m).    Weight as of this encounter: 142 lb (64.4 kg).  BP completed using cuff size: regular          Shraddha Coburn CMA            "

## 2018-03-01 NOTE — PROGRESS NOTES
The patient is a 62-year-old white female, para 2-0-0-2, menopause at age 52, not on hormone replacement therapy, who I was asked to see by Dr. Elmore and Dr. Story for evaluation of pelvic organ prolapse with urinary incontinence.  The patient states that in October 2017 while in the shower she noticed something bulging from the vagina.  She presented to Dr. Elmore for evaluation and a large cystocele and uterine descensus was noted.  The patient also stated that for the last couple of years, she has had leakage of urine with laughing, lifting, coughing and straining as well as some symptoms of urgency incontinence.  The patient does drink 2 cans of Diet Mountain Dew or Diet Dr. Pepper daily and also has 8-10 water bottles daily.  We reviewed these findings with the patient performed a urodynamic study preoperatively, discussed at length the risks, benefits and alternative forms of therapy on multiple occasions and we made a decision to proceed with the following  PROCEDURE:  Exam under anesthesia, da Tim robotic supracervical hysterectomy, bilateral salpingo-oophorectomy, sacral colpopexy, cystoscopy  This was done on 2/13/2018.  I did not put a sling in this patient as at the completion of the sacral colpopexy apical anterior and posterior compartment were such that hypermobility of the urethrovesical angle was resolved and in the absence of hypermobility and in the presence of urgency frequency my concern was potentially aggravating her urgency frequency.  I reviewed this rationale again with the patient and her  today.  We discussed bladder irritants and her use of carbonated and caffeinated products strongly advised against using these.  The patient states that otherwise she is doing well her pain is in good control bowel bladder function is working well with no leakage    Past Medical History:   Diagnosis Date     Allergic rhinitis, cause unspecified      Arthritis      Dysphagia     had  "esophagram 12/07 - tertiary contractions of esoph with some retention, EGD showed gastritis, duodenitis and esophagitis ,pt didn't start a PPI       Fatigue      Gastroesophageal reflux disease      H/O YISEL I     s/p culpo with bx x 2 and cryotherapy - ok for paps q3yrs starting 2012     Helicobacter pylori (H. pylori) infection in 2009     Hyperlipidemia LDL goal < 130 2/4/2005    lipitor \"attacked my muscles\" (Hx fibromyalgia); [id number] failed to lower lipids enough&=myalgias also , lovastatin 20mg = myalgias      Lyme disease 8/20/2010     Myalgia and myositis, unspecified     fibromyalgia - doing an otc human growth  hormone      OBESITY NOS- hx of      lost 30+ pounds since 2002 breast reduction      Obesity- worsening fibromyalgia, back pain, pcos  12/1/2009     Polycystic ovaries      Postmenopausal since age 52      Shingles      /70  Wt 142 lb (64.4 kg)  LMP 06/03/2010  BMI 25.97 kg/m2  Constitutional: healthy, alert and no distress  Cardiovascular: negative, PMI normal. No lifts, heaves, or thrills. RRR. No murmurs, clicks gallops or rub  Respiratory: negative, Percussion normal. Good diaphragmatic excursion. Lungs clear  Gastrointestinal: Abdomen soft, mildly-tender over the right upper quadrant incision where the uterus was removed.  All incisions are clean and intact and healing well I see no evidence of any infection herniation or complication. BS normal. No masses, organomegaly  Genitourinary: Normal external genitalia without lesions and no leakage of urine with coughing or straining, speculum, bimanual exam uterus is absent cervical stump is at a DeLancey level 1 level of support adnexa without masses enlargement or tenderness, rectovaginal exam normal sphincter tone good support    (Z98.890) Postoperative state  (primary encounter diagnosis)  Comment: Doing well without concerns  Plan: Continue with pelvic rest.  Patient should continue to void every 2 hours while awake she no " longer has double void as I believe she is adequately emptying.  Continue with postop activity restrictions.  I stressed the importance of a bowel regimen I will see her back in 4-6 weeks and again 12 weeks postop.  Written plan and review of the findings given

## 2018-03-26 ENCOUNTER — OFFICE VISIT (OUTPATIENT)
Dept: OBGYN | Facility: CLINIC | Age: 63
End: 2018-03-26
Payer: COMMERCIAL

## 2018-03-26 VITALS
HEIGHT: 62 IN | WEIGHT: 147.8 LBS | SYSTOLIC BLOOD PRESSURE: 118 MMHG | BODY MASS INDEX: 27.2 KG/M2 | HEART RATE: 80 BPM | DIASTOLIC BLOOD PRESSURE: 66 MMHG

## 2018-03-26 DIAGNOSIS — Z98.890 POSTOPERATIVE STATE: Primary | ICD-10-CM

## 2018-03-26 PROCEDURE — 99024 POSTOP FOLLOW-UP VISIT: CPT | Performed by: OBSTETRICS & GYNECOLOGY

## 2018-03-26 NOTE — LETTER
[place] [address]  [facility] [address]  Phone: [phone]    March 26, 2018        Edwina Zhou  [address] [facility] [address]          To whom it may concern:    RE: Edwina Zhou    Patient was seen and treated today at our clinic.  Patient may return to work 3/26/2018 with the following:  No working or lifting restrictions    Please contact me for questions or concerns.      Sincerely,        North Cai MD

## 2018-03-26 NOTE — NURSING NOTE
"Chief Complaint   Patient presents with     Surgical Followup     Patient had Cystoscopy, bilateral ureteral stent placement,  Robotic supracervical hysterectomy, bilateral salpingo-oophorectomy and sacrocolpopexy,  cystoscopy on 02/13/18     Letter for School/Work     needs return to work clearance letter.       Initial /66 (BP Location: Left arm, Patient Position: Chair, Cuff Size: Adult Regular)  Pulse 80  Ht 5' 2\" (1.575 m)  Wt 147 lb 12.8 oz (67 kg)  LMP 06/03/2010  BMI 27.03 kg/m2 Estimated body mass index is 27.03 kg/(m^2) as calculated from the following:    Height as of this encounter: 5' 2\" (1.575 m).    Weight as of this encounter: 147 lb 12.8 oz (67 kg).  Medication Reconciliation: complete     Anabella Rodriguez CMA      "

## 2018-03-26 NOTE — MR AVS SNAPSHOT
After Visit Summary   3/26/2018    Edwina Zhou    MRN: [id number]           Patient Information     Date Of Birth          1955        Visit Information        Provider Department      3/26/2018 11:15 AM North Cai MD [facility]        Today's Diagnoses     Postoperative state    -  1      Care Instructions    You can reach your [place] Care Team any time of the day by calling [phone]. This number will put you in touch with the 24 hour nurse line if the clinic is closed.    To contact your OB/GYN Station Coordinator/Surgery Scheduler please call [phone]. This is a direct number for your care team between 8 a.m. and 4 p.m. Monday through Friday.    [facility] is open for your convenience:  Monday through Friday 8 a.m. to 6 p.m.  Closed weekends and all major holidays.            Follow-ups after your visit        Follow-up notes from your care team     Return in about 6 weeks (around 5/7/2018).      Your next 10 appointments already scheduled     May 07, 2018  9:00 AM CDT   SHORT with North Cai MD   [facility] ([facility]    [address]  [facility] [id number] [phone]              Who to contact     If you have questions or need follow up information about today's clinic visit or your schedule please contact [facility] directly at [phone].  Normal or non-critical lab and imaging results will be communicated to you by MyChart, letter or phone within 4 business days after the clinic has received the results. If you do not hear from us within 7 days, please contact the clinic through MyChart or phone. If you have a critical or abnormal lab result, we will notify you by phone as soon as possible.  Submit refill requests through [employer] or call your pharmacy and they will forward the refill request to us. Please allow 3 business days for your refill to be completed.           "Additional Information About Your Visit        MyChart Information     [employer] gives you secure access to your electronic health record. If you see a primary care provider, you can also send messages to your care team and make appointments. If you have questions, please call your primary care clinic.  If you do not have a primary care provider, please call [phone] and they will assist you.        Care EveryWhere ID     This is your Care EveryWhere ID. This could be used by other organizations to access your [facility] medical records  [id number]        Your Vitals Were     Pulse Height Last Period BMI (Body Mass Index)          80 5' 2\" (1.575 m) 06/03/2010 27.03 kg/m2         Blood Pressure from Last 3 Encounters:   03/26/18 118/66   02/27/18 102/70   02/14/18 (!) 83/47    Weight from Last 3 Encounters:   03/26/18 147 lb 12.8 oz (67 kg)   02/27/18 142 lb (64.4 kg)   02/13/18 139 lb 8 oz (63.3 kg)              Today, you had the following     No orders found for display         Today's Medication Changes          These changes are accurate as of 3/26/18 11:59 PM.  If you have any questions, ask your nurse or doctor.               These medicines have changed or have updated prescriptions.        Dose/Directions    hydrocortisone 2.5 % cream   This may have changed:    - when to take this  - reasons to take this  - additional instructions   Used for:  Blepharitis of right upper eyelid, unspecified type        Apply very small amount to eyelids up to twice daily   Quantity:  20 g   Refills:  3         Stop taking these medicines if you haven't already. Please contact your care team if you have questions.     acetaminophen 325 MG tablet   Commonly known as:  TYLENOL   Stopped by:  North Cai MD                    Primary Care Provider Office Phone # Fax #    Phuong Story [phone]       [address] [place] [address]        Equal Access to Services     FILI MCKEON AH: " Hadii ria matias Sojelaniali, waaxda luqadaha, qaybta kaallamin hansen, papito natashain hayaaamado horowitzdwayne valle laismaelamado myles. So [facility] [phone].    ATENCIÓN: Si bertha bermudez, tiene a ruiz disposición servicios gratuitos de asistencia lingüística. Deepikaame al [phone].    We comply with applicable federal civil rights laws and [place] laws. We do not discriminate on the basis of race, color, national origin, age, disability, sex, sexual orientation, or gender identity.            Thank you!     Thank you for choosing [facility]  for your care. Our goal is always to provide you with excellent care. Hearing back from our patients is one way we can continue to improve our services. Please take a few minutes to complete the written survey that you may receive in the mail after your visit with us. Thank you!             Your Updated Medication List - Protect others around you: Learn how to safely use, store and throw away your medicines at www.disposemymeds.org.          This list is accurate as of 3/26/18 11:59 PM.  Always use your most recent med list.                   Brand Name Dispense Instructions for use Diagnosis    calcium carbonate 1250 MG tablet    OS-PACO 500 mg [employer]. Ca     Take 2 tablets by mouth daily        hydrocortisone 2.5 % cream     20 g    Apply very small amount to eyelids up to twice daily    Blepharitis of right upper eyelid, unspecified type       ibuprofen 600 MG tablet    ADVIL/MOTRIN    30 tablet    Take 1 tablet (600 mg) by mouth every 6 hours as needed for pain (mild)    Post-operative state       loratadine 10 MG tablet    CLARITIN    3 MONTHS    ONE DAILY        melatonin 1 MG/ML Liqd liquid      Take 3 mg by mouth nightly as needed for sleep        multivitamin per tablet     100 tablet    Take 1 tablet by mouth daily.    Routine general medical examination at a health care facility       omega 3 1000 MG Caps     90 Cap    once daily    Urticaria, Hives       RANITIDINE HCL  PO      Take 150 mg by mouth At Bedtime        vitamin D3 2000 UNITS Caps      1 capsule daily - takes only intermittently    Vitamin D deficiency

## 2018-03-27 NOTE — PROGRESS NOTES
The patient is a 62-year-old white female, para 2-0-0-2, menopause at age 52, not on hormone replacement therapy, who I was asked to see by Dr. Elmore and Dr. Story for evaluation of pelvic organ prolapse with urinary incontinence.  The patient states that in October 2017 while in the shower she noticed something bulging from the vagina.  She presented to Dr. Elmore for evaluation and a large cystocele and uterine descensus was noted.  We reviewed these findings with the patient performed a urodynamic study preoperatively, discussed at length the risks, benefits and alternative forms of therapy on multiple occasions and we made a decision to proceed with the following  PROCEDURE:  Exam under anesthesia, da Tim robotic supracervical hysterectomy, bilateral salpingo-oophorectomy, sacral colpopexy, cystoscopy  This was done on 2/13/2018.  I did not put a sling in this patient as at the completion of the sacral colpopexy apical anterior and posterior compartment were such that hypermobility of the urethrovesical angle was resolved and in the absence of hypermobility and in the presence of urgency frequency my concern was potentially aggravating her urgency frequency.  The patient presents today for 6 week postop follow-up visit.  She is doing well.  Patient states that infrequently she could leak a small amount of urine with coughing and sneezing.  Does have some pelvic pressure but otherwise has normal bowel and bladder function.  Patient has not been following the recommendation to void every 2 hours.  No vaginal discharge    Past Medical History:   Diagnosis Date     Allergic rhinitis, cause unspecified      Arthritis      Dysphagia     had esophagram 12/07 - tertiary contractions of esoph with some retention, EGD showed gastritis, duodenitis and esophagitis ,pt didn't start a PPI       Fatigue      Gastroesophageal reflux disease      H/O YISEL I     s/p culpo with bx x 2 and cryotherapy - ok for paps  "q3yrs starting 2012     Helicobacter pylori (H. pylori) infection in 2009     Hyperlipidemia LDL goal < 130 2/4/2005    lipitor \"attacked my muscles\" (Hx fibromyalgia); [id number] failed to lower lipids enough&=myalgias also , lovastatin 20mg = myalgias      Lyme disease 8/20/2010     Myalgia and myositis, unspecified     fibromyalgia - doing an otc human growth  hormone      OBESITY NOS- hx of      lost 30+ pounds since 2002 breast reduction      Obesity- worsening fibromyalgia, back pain, pcos  12/1/2009     Polycystic ovaries      Postmenopausal since age 52      Shingles      /66 (BP Location: Left arm, Patient Position: Chair, Cuff Size: Adult Regular)  Pulse 80  Ht 5' 2\" (1.575 m)  Wt 147 lb 12.8 oz (67 kg)  LMP 06/03/2010  BMI 27.03 kg/m2  Constitutional: healthy, alert and no distress  Cardiovascular: negative, PMI normal. No lifts, heaves, or thrills. RRR. No murmurs, clicks gallops or rub  Respiratory: negative, Percussion normal. Good diaphragmatic excursion. Lungs clear  Gastrointestinal: Abdomen soft, non-tender. BS normal. No masses, organomegaly  Genitourinary: Normal external genitalia without lesions and no leakage of urine with coughing or straining, speculum exam excellent anterior apical and posterior compartment support, multipara appearing cervix, bimanual exam the uterus is absent adnexa without masses enlargement or tenderness, rectovaginal exam normal sphincter tone excellent pelvic floor support,     (Z98.890) Postoperative state  (primary encounter diagnosis)  Comment: Patient is doing well.  I have encouraged voiding every 2 hours to keep urine volume small.  On the see her back in 6 weeks for a final 12 week visit.  Advised continuing with pelvic rest and lifting restrictions.  I stressed the importance of a bowel regimen  Plan: The patient can return to work in adult [employer] at [employer].  Symptoms of concern discussed patient to call.  Written plan " given

## 2018-03-27 NOTE — PATIENT INSTRUCTIONS
You can reach your [facility] Care Team any time of the day by calling [phone]. This number will put you in touch with the 24 hour nurse line if the clinic is closed.    To contact your OB/GYN Station Coordinator/Surgery Scheduler please call [phone]. This is a direct number for your care team between 8 a.m. and 4 p.m. Monday through Friday.    [facility] is open for your convenience:  Monday through Friday 8 a.m. to 6 p.m.  Closed weekends and all major holidays.

## 2018-05-07 ENCOUNTER — OFFICE VISIT (OUTPATIENT)
Dept: OBGYN | Facility: CLINIC | Age: 63
End: 2018-05-07
Payer: COMMERCIAL

## 2018-05-07 VITALS — BODY MASS INDEX: 26.89 KG/M2 | WEIGHT: 147 LBS | SYSTOLIC BLOOD PRESSURE: 104 MMHG | DIASTOLIC BLOOD PRESSURE: 66 MMHG

## 2018-05-07 DIAGNOSIS — Z98.890 POST-OPERATIVE STATE: ICD-10-CM

## 2018-05-07 DIAGNOSIS — N39.46 MIXED INCONTINENCE URGE AND STRESS (MALE)(FEMALE): ICD-10-CM

## 2018-05-07 DIAGNOSIS — N81.4 CYSTOCELE WITH UTERINE DESCENSUS: Primary | ICD-10-CM

## 2018-05-07 PROCEDURE — 99024 POSTOP FOLLOW-UP VISIT: CPT | Performed by: OBSTETRICS & GYNECOLOGY

## 2018-05-07 NOTE — MR AVS SNAPSHOT
After Visit Summary   5/7/2018    Edwina Zhou    MRN: [id number]           Patient Information     Date Of Birth          1955        Visit Information        Provider Department      5/7/2018 9:00 AM North Cai MD [facility]        Today's Diagnoses     Cystocele with uterine descensus    -  1    Mixed incontinence urge and stress  (female)        Post-operative state          Care Instructions    You can reach your [facility] Care Team any time of the day by calling [phone]. This number will put you in touch with the 24 hour nurse line if the clinic is closed.    To contact your OB/GYN Station Coordinator/Surgery Scheduler please call [phone]. This is a direct number for your care team between 8 a.m. and 4 p.m. Monday through Friday.    [facility] is open for your convenience:  Monday through Friday 8 a.m. to 6 p.m.  Closed weekends and all major holidays.            Follow-ups after your visit        Follow-up notes from your care team     Return in about 6 months (around 11/7/2018).      Your next 10 appointments already scheduled     Nov 05, 2018  8:30 AM CST   SHORT with North Cai MD   [facility]    [address]  [facility] [address] [phone]              Who to contact     If you have questions or need follow up information about today's clinic visit or your schedule please contact [facility] directly at [phone].  Normal or non-critical lab and imaging results will be communicated to you by MyChart, letter or phone within 4 business days after the clinic has received the results. If you do not hear from us within 7 days, please contact the clinic through MyChart or phone. If you have a critical or abnormal lab result, we will notify you by phone as soon as possible.  Submit refill requests through [employer] or call your pharmacy and they will forward the refill  request to us. Please allow 3 business days for your refill to be completed.          Additional Information About Your Visit        MyChart Information     [employer] gives you secure access to your electronic health record. If you see a primary care provider, you can also send messages to your care team and make appointments. If you have questions, please call your primary care clinic.  If you do not have a primary care provider, please call [phone] and they will assist you.        Care EveryWhere ID     This is your Care EveryWhere ID. This could be used by other organizations to access your [facility] medical records  [id number]        Your Vitals Were     Last Period BMI (Body Mass Index)                06/03/2010 26.89 kg/m2           Blood Pressure from Last 3 Encounters:   05/07/18 104/66   03/26/18 118/66   02/27/18 102/70    Weight from Last 3 Encounters:   05/07/18 147 lb (66.7 kg)   03/26/18 147 lb 12.8 oz (67 kg)   02/27/18 142 lb (64.4 kg)              Today, you had the following     No orders found for display         Today's Medication Changes          These changes are accurate as of 5/7/18 11:59 PM.  If you have any questions, ask your nurse or doctor.               These medicines have changed or have updated prescriptions.        Dose/Directions    hydrocortisone 2.5 % cream   This may have changed:    - when to take this  - reasons to take this  - additional instructions   Used for:  Blepharitis of right upper eyelid, unspecified type        Apply very small amount to eyelids up to twice daily   Quantity:  20 g   Refills:  3                Primary Care Provider Office Phone # Fax #    Phuongsamir Story [phone]       [facility] [address]        Equal Access to Services     [facility] CenterGENE : therese Dixon, papito barber. So [facility] [phone].    ATENCIÓN: Leann stone  español, tiene a ruiz disposición servicios gratuitos de asistencia lingüística. Rebecca cano [phone].    We comply with applicable federal civil rights laws and [place] laws. We do not discriminate on the basis of race, color, national origin, age, disability, sex, sexual orientation, or gender identity.            Thank you!     Thank you for choosing [facility]  for your care. Our goal is always to provide you with excellent care. Hearing back from our patients is one way we can continue to improve our services. Please take a few minutes to complete the written survey that you may receive in the mail after your visit with us. Thank you!             Your Updated Medication List - Protect others around you: Learn how to safely use, store and throw away your medicines at www.disposemymeds.org.          This list is accurate as of 5/7/18 11:59 PM.  Always use your most recent med list.                   Brand Name Dispense Instructions for use Diagnosis    calcium carbonate 500 tablet    OS-PACO 500 mg [employer]. Ca     Take 2 tablets by mouth daily        hydrocortisone 2.5 % cream     20 g    Apply very small amount to eyelids up to twice daily    Blepharitis of right upper eyelid, unspecified type       ibuprofen 600 MG tablet    ADVIL/MOTRIN    30 tablet    Take 1 tablet (600 mg) by mouth every 6 hours as needed for pain (mild)    Post-operative state       loratadine 10 MG tablet    CLARITIN    3 MONTHS    ONE DAILY        melatonin 1 MG/ML Liqd liquid      Take 3 mg by mouth nightly as needed for sleep        multivitamin per tablet     100 tablet    Take 1 tablet by mouth daily.    Routine general medical examination at a health care facility       omega 3 1000 MG Caps     90 Cap    once daily    Urticaria, Hives       RANITIDINE HCL PO      Take 150 mg by mouth At Bedtime        vitamin D3 2000 units Caps      1 capsule daily - takes only intermittently    Vitamin D deficiency

## 2018-05-07 NOTE — NURSING NOTE
"Chief Complaint   Patient presents with     Surgical Followup       Initial /66  Wt 147 lb (66.7 kg)  LMP 2010  BMI 26.89 kg/m2 Estimated body mass index is 26.89 kg/(m^2) as calculated from the following:    Height as of 3/26/18: 5' 2\" (1.575 m).    Weight as of this encounter: 147 lb (66.7 kg).  BP completed using cuff size: regular        The following HM Due: NONE      The following patient reported/Care Every where data was sent to:  P ABSTRACT QUALITY INITIATIVES [[id number]]        Shraddha Coburn CMA                 "

## 2018-05-07 NOTE — PATIENT INSTRUCTIONS
You can reach your [facility] Care Team any time of the day by calling [phone]. This number will put you in touch with the 24 hour nurse line if the clinic is closed.    To contact your OB/GYN Station Coordinator/Surgery Scheduler please call [phone]. This is a direct number for your care team between 8 a.m. and 4 p.m. Monday through Friday.    [facility] is open for your convenience:  Monday through Friday 8 a.m. to 6 p.m.  Closed weekends and all major holidays.

## 2018-05-08 NOTE — PROGRESS NOTES
The patient is a 62-year-old white female, para 2-0-0-2, menopause at age 52, not on hormone replacement therapy, who I was asked to see by Dr. Elmore and Dr. Story for evaluation of pelvic organ prolapse with urinary incontinence.  The patient states that in October 2017 while in the shower she noticed something bulging from the vagina.  She presented to Dr. Elmore for evaluation and a large cystocele and uterine descensus was noted.  We reviewed these findings with the patient performed a urodynamic study preoperatively, discussed at length the risks, benefits and alternative forms of therapy on multiple occasions and we made a decision to proceed with the following  PROCEDURE:  Exam under anesthesia, da Tim robotic supracervical hysterectomy, bilateral salpingo-oophorectomy, sacral colpopexy, cystoscopy  This was done on 2/13/2018.  I did not put a sling in this patient as at the completion of the sacral colpopexy apical anterior and posterior compartment were such that hypermobility of the urethrovesical angle was resolved and in the absence of hypermobility and in the presence of urgency frequency my concern was potentially aggravating her urgency frequency.  The patient presents today for 12 week postop follow-up visit.  She is doing well.  Patient states that infrequently she could leak a small amount of urine with coughing and sneezing.  Does have relatively regular daily or QOD BM's  Pt has a hx of fibromyalgia, hikes often and follows a healthy diet.  Pt notes some SI joint pain    Past Medical History:   Diagnosis Date     Allergic rhinitis, cause unspecified      Arthritis      Dysphagia     had esophagram 12/07 - tertiary contractions of esoph with some retention, EGD showed gastritis, duodenitis and esophagitis ,pt didn't start a PPI       Fatigue      Gastroesophageal reflux disease      H/O YISEL I     s/p culpo with bx x 2 and cryotherapy - ok for paps q3yrs starting 2012      "Helicobacter pylori (H. pylori) infection in 2009     Hyperlipidemia LDL goal < 130 2/4/2005    lipitor \"attacked my muscles\" (Hx fibromyalgia); [id number] failed to lower lipids enough&=myalgias also , lovastatin 20mg = myalgias      Lyme disease 8/20/2010     Myalgia and myositis, unspecified     fibromyalgia - doing an otc human growth  hormone      OBESITY NOS- hx of      lost 30+ pounds since 2002 breast reduction      Obesity- worsening fibromyalgia, back pain, pcos  12/1/2009     Polycystic ovaries      Postmenopausal since age 52      Shingles      Past Surgical History:   Procedure Laterality Date     COMBINED CYSTOSCOPY, INSERT STENT URETER(S) Bilateral 2/13/2018    Procedure: COMBINED CYSTOSCOPY, INSERT STENT URETER(S);  cystoscopy with bilateral ureteral stent placement;  Surgeon: Stephen Muniz MD;  Location: [facility] OR     COSMETIC SURGERY       CYSTOSCOPY N/A 2/13/2018    Procedure: CYSTOSCOPY;   CYSTOSCOPY;  Surgeon: North Cai MD;  Location: [facility] OR     DAVINCI HYSTERECTOMY SUPRACERVICAL, SACROCOLPOPEXY, COMBINED N/A 2/13/2018    Procedure: COMBINED DAVINCI HYSTERECTOMY SUPRACERVICAL, SACROCOLPOPEXY;  Cystoscopy, bilateral ureteral stent placement,  Robotic supracervical hysterectomy, bilateral salpingo-oophorectomy and sacrocolpopexy,  cystoscopy, removal of ureteral stents;  Surgeon: North Cai MD;  Location: [facility] OR     EXAM UNDER ANESTHESIA PELVIC N/A 2/13/2018    Procedure: EXAM UNDER ANESTHESIA PELVIC;;  Surgeon: North Cai MD;  Location: [facility] OR     KNEE SURGERY Right     Meniscal repair     SURGICAL HISTORY OF -   1990's    COLPO WITH BX X2 + CRYO     SURGICAL HISTORY OF -       WISDOM TEETH-SUBSEQ MINDY FXR=ORIF     SURGICAL HISTORY OF -   2002    breast reduction - [facility]      TONSILLECTOMY  at age 32      ROS: 10 point ROS neg other than the symptoms noted above in the HPI.  /66  Wt 147 lb (66.7 kg)  LMP 06/03/2010  BMI 26.89 " kg/m2  Constitutional: healthy, alert and no distress  Cardiovascular: negative, PMI normal. No lifts, heaves, or thrills. RRR. No murmurs, clicks gallops or rub  Respiratory: negative, Percussion normal. Good diaphragmatic excursion. Lungs clear  Gastrointestinal: Abdomen soft, non-tender. BS normal. No masses, organomegaly  Genitourinary: Normal external genitalia without lesions and no leakage of urine with cough or strain, spec: excellent anterior apical and posterior compartment support BM: no masses  Cx stump well supported RV no masses    (N81.4) Cystocele with uterine descensus  (primary encounter diagnosis)  Comment: resolved  Plan: pt may resume sexual activity  Advises against strenuous lifting and straining  rec bowel regimen to ensure soft stools    (N39.46) Mixed incontinence urge and stress  (female)  Comment: pt has excellent anterior support  rec timed voidings and avoiding bladder irritants  Will f/u pt in 10/18-11/18  If incontinence still a concern would consider a sling or other appropriate rx  Plan: plan rev with pt    (Z98.890) Post-operative state  Comment: o/w doing well  Plan: as above

## 2018-07-19 ENCOUNTER — TELEPHONE (OUTPATIENT)
Dept: FAMILY MEDICINE | Facility: CLINIC | Age: 63
End: 2018-07-19

## 2018-07-19 NOTE — TELEPHONE ENCOUNTER
"Patient calling    Stated for the past 1 month she has been having bouts of SOB, severe fatigue, joint pain.   Starting last weekend, patient was starting to get dizziness that wouldn't go away. For the first few days patient was constantly dizzy, then after that is was sporadic.   Patient states these bouts occur ~12 times per day    Dizziness  Onset: 1 Month    Description:   Do you feel faint:  no   Does it feel like the surroundings (bed, room) are moving: YES  Unsteady/off balance: YES (Just on Saturday & Sunday, haven't experienced it again)  Have you passed out or fallen: no     Intensity: mild, moderate    Progression of Symptoms:  same and intermittent    Accompanying Signs & Symptoms:  Heart palpitations: Yes - \"sort of feel like my heart is going faster, but I don't know\"  Nausea, vomiting: YES- waves of nausea  Weakness in arms or legs: YES  Fatigue: YES- Severe  Vision or speech changes: no   Ringing in ears (Tinnitus): no   Hearing Loss: no     History:   Head trauma/concussion hx: no   Previous similar symptoms: YES- see below  Recent bleeding history: no     Precipitating factors:   Worse with activity or head movement: YES  Any new medications (BP?): no   Alcohol/drug abuse/withdrawal: no     Alleviating factors:   Does staying in a fixed position give relief:  YES    Therapies Tried and outcome: None       Patient states she is very prone to Lyme's disease. Patient states that she h[place] in the woods all the time (around here and also [place]), has not had any visible tick bites.   Patient states the symptoms feel similar to Lyme's Disease    DENIES: CP, Difficulty Breathing, Lightheadedness, Numbness/Tingling, Vision/Speech Changes, N/V, rash, swelling    Advised OV - scheduled with MD RED @ 07/20/2018    Advised patient that if new or worsening symptoms appear (reviewed new & worsening symptoms) to call the clinic or be seen in the the ER  Patient stated an understanding and agreed with " plan.    Negra Verdugo RN  [facility]

## 2018-07-20 ENCOUNTER — OFFICE VISIT (OUTPATIENT)
Dept: FAMILY MEDICINE | Facility: CLINIC | Age: 63
End: 2018-07-20
Payer: COMMERCIAL

## 2018-07-20 VITALS
DIASTOLIC BLOOD PRESSURE: 74 MMHG | HEIGHT: 62 IN | HEART RATE: 69 BPM | BODY MASS INDEX: 27.6 KG/M2 | WEIGHT: 150 LBS | OXYGEN SATURATION: 98 % | SYSTOLIC BLOOD PRESSURE: 128 MMHG | TEMPERATURE: 98.2 F

## 2018-07-20 DIAGNOSIS — Z90.710 S/P HYSTERECTOMY WITH OOPHORECTOMY: ICD-10-CM

## 2018-07-20 DIAGNOSIS — M25.50 MULTIPLE JOINT PAIN: ICD-10-CM

## 2018-07-20 DIAGNOSIS — R06.09 DOE (DYSPNEA ON EXERTION): ICD-10-CM

## 2018-07-20 DIAGNOSIS — H81.13 BENIGN PAROXYSMAL POSITIONAL VERTIGO DUE TO BILATERAL VESTIBULAR DISORDER: ICD-10-CM

## 2018-07-20 DIAGNOSIS — R00.2 PALPITATIONS: ICD-10-CM

## 2018-07-20 DIAGNOSIS — Z11.4 SCREENING FOR HIV (HUMAN IMMUNODEFICIENCY VIRUS): ICD-10-CM

## 2018-07-20 DIAGNOSIS — H83.09 LABYRINTHITIS, UNSPECIFIED LATERALITY: ICD-10-CM

## 2018-07-20 DIAGNOSIS — R42 DIZZINESS: ICD-10-CM

## 2018-07-20 DIAGNOSIS — Z90.721 S/P HYSTERECTOMY WITH OOPHORECTOMY: ICD-10-CM

## 2018-07-20 DIAGNOSIS — R06.02 SOB (SHORTNESS OF BREATH): ICD-10-CM

## 2018-07-20 DIAGNOSIS — R53.83 OTHER FATIGUE: Primary | ICD-10-CM

## 2018-07-20 DIAGNOSIS — Z12.11 SCREEN FOR COLON CANCER: ICD-10-CM
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
## 2018-07-20 DIAGNOSIS — Z78.0 POSTMENOPAUSAL: ICD-10-CM

## 2018-07-20 LAB
BASOPHILS # BLD AUTO: 0 10E9/L (ref 0–0.2)
BASOPHILS NFR BLD AUTO: 0.6 %
DIFFERENTIAL METHOD BLD: NORMAL
EOSINOPHIL # BLD AUTO: 0.3 10E9/L (ref 0–0.7)
EOSINOPHIL NFR BLD AUTO: 4.9 %
ERYTHROCYTE [DISTWIDTH] IN BLOOD BY AUTOMATED COUNT: 12.2 % (ref 10–15)
HCT VFR BLD AUTO: 40.2 % (ref 35–47)
HGB BLD-MCNC: 13.3 G/DL (ref 11.7–15.7)
LYMPHOCYTES # BLD AUTO: 2.5 10E9/L (ref 0.8–5.3)
LYMPHOCYTES NFR BLD AUTO: 36.2 %
MCH RBC QN AUTO: 31.1 PG (ref 26.5–33)
MCHC RBC AUTO-ENTMCNC: 33.1 G/DL (ref 31.5–36.5)
MCV RBC AUTO: 94 FL (ref 78–100)
MONOCYTES # BLD AUTO: 0.5 10E9/L (ref 0–1.3)
MONOCYTES NFR BLD AUTO: 6.7 %
NEUTROPHILS # BLD AUTO: 3.6 10E9/L (ref 1.6–8.3)
NEUTROPHILS NFR BLD AUTO: 51.6 %
PLATELET # BLD AUTO: 266 10E9/L (ref 150–450)
RBC # BLD AUTO: 4.28 10E12/L (ref 3.8–5.2)
WBC # BLD AUTO: 6.9 10E9/L (ref 4–11)

## 2018-07-20 PROCEDURE — 84403 ASSAY OF TOTAL TESTOSTERONE: CPT | Performed by: FAMILY MEDICINE

## 2018-07-20 PROCEDURE — 86753 PROTOZOA ANTIBODY NOS: CPT | Mod: 90 | Performed by: FAMILY MEDICINE

## 2018-07-20 PROCEDURE — 80053 COMPREHEN METABOLIC PANEL: CPT | Performed by: FAMILY MEDICINE

## 2018-07-20 PROCEDURE — 83525 ASSAY OF INSULIN: CPT | Performed by: FAMILY MEDICINE

## 2018-07-20 PROCEDURE — 84144 ASSAY OF PROGESTERONE: CPT | Performed by: FAMILY MEDICINE

## 2018-07-20 PROCEDURE — 36415 COLL VENOUS BLD VENIPUNCTURE: CPT | Performed by: FAMILY MEDICINE

## 2018-07-20 PROCEDURE — 86666 EHRLICHIA ANTIBODY: CPT | Mod: 90 | Performed by: FAMILY MEDICINE

## 2018-07-20 PROCEDURE — 86140 C-REACTIVE PROTEIN: CPT | Performed by: FAMILY MEDICINE

## 2018-07-20 PROCEDURE — 85652 RBC SED RATE AUTOMATED: CPT | Performed by: FAMILY MEDICINE

## 2018-07-20 PROCEDURE — 82670 ASSAY OF TOTAL ESTRADIOL: CPT | Performed by: FAMILY MEDICINE

## 2018-07-20 PROCEDURE — 99000 SPECIMEN HANDLING OFFICE-LAB: CPT | Performed by: FAMILY MEDICINE

## 2018-07-20 PROCEDURE — 99215 OFFICE O/P EST HI 40 MIN: CPT | Performed by: FAMILY MEDICINE

## 2018-07-20 PROCEDURE — 87389 HIV-1 AG W/HIV-1&-2 AB AG IA: CPT | Performed by: FAMILY MEDICINE

## 2018-07-20 PROCEDURE — 83001 ASSAY OF GONADOTROPIN (FSH): CPT | Performed by: FAMILY MEDICINE

## 2018-07-20 PROCEDURE — 86618 LYME DISEASE ANTIBODY: CPT | Performed by: FAMILY MEDICINE

## 2018-07-20 PROCEDURE — 84443 ASSAY THYROID STIM HORMONE: CPT | Performed by: FAMILY MEDICINE

## 2018-07-20 PROCEDURE — 85025 COMPLETE CBC W/AUTO DIFF WBC: CPT | Performed by: FAMILY MEDICINE

## 2018-07-20 PROCEDURE — 83002 ASSAY OF GONADOTROPIN (LH): CPT | Performed by: FAMILY MEDICINE

## 2018-07-20 PROCEDURE — 93000 ELECTROCARDIOGRAM COMPLETE: CPT | Performed by: FAMILY MEDICINE

## 2018-07-20 RX ORDER — DOXYCYCLINE 100 MG/1
100 CAPSULE ORAL 2 TIMES DAILY
Qty: 28 CAPSULE | Refills: 0 | Status: SHIPPED | OUTPATIENT
Start: 2018-07-20 | End: 2018-08-03

## 2018-07-20 NOTE — MR AVS SNAPSHOT
After Visit Summary   7/20/2018    Edwina Zhou    MRN: [id number]           Patient Information     Date Of Birth          1955        Visit Information        Provider Department      7/20/2018 2:30 PM Phuong Story MD [facility] [place]        Today's Diagnoses     Other fatigue    -  1    Dizziness        SOB (shortness of breath)        Benign paroxysmal positional vertigo due to bilateral vestibular disorder        Screen for colon cancer        Screening for HIV (human immunodeficiency virus)        Multiple joint pain        Postmenopausal        S/P hysterectomy with oophorectomy        ANDREWS (dyspnea on exertion)        Tick bite, sequela          Care Instructions                Look at your inflammatory foods:avoid or eliminated  processed flours, processed sugars.  For anti-inflammatory foods: Try to stick with dark green leafy veggies, organic foods, beets, walnuts, green tea, salmon, blueberries, pomegranates, olive oil, fish oil, dark red tart cherries.      Positional Vertigo          What is positional vertigo?   Positional vertigo is an inner ear problem. It causes brief but sometimes severe feelings of spinning. Some people feel that their head or body is spinning. Others feel the room is spinning. People often say they are dizzy, but dizzy is a very general term. Vertigo, on the other hand, is the very specific feeling of uncontrollable spinning.   Symptoms of positional vertigo happen suddenly when you change the position of your head.   How does it occur?   In the inner part of your ear are 3 semicircular canals. Movement of the fluid in these canals helps your brain maintain your balance and know what position you are in (for example, standing up, lying down, or standing on your head).   Sometimes small crystals of calcium develop and float in the fluid in the inner ear. This can happen after a head injury, with a severe cold, or simply as a part of normal  aging. The crystals can cause vertigo when you change head position and they strike against nerve endings in the semicircular canals. Usually the calcium crystals dissolve in a few weeks and stop causing vertigo. However, sometimes the crystals do not dissolve and the vertigo returns from time to time.   What are the symptoms?   A sudden feeling that you are spinning, or that the room is spinning, is the main symptom. You may feel the vertigo when you first wake up. It may seem that any turn of your head brings on brief but intense spells of vertigo. It may happen when you tilt your head, look up or down, or roll over in bed.   You may have nausea and vomiting along with the vertigo. Even if a spell of vertigo is brief, you may have a feeling of queasiness for several minutes or even hours afterward.   How is it diagnosed?   Your healthcare provider will ask about your symptoms and examine you. You may also be given a Teri-Hallpike position test.   You start the Pulaski-Hallpike test by sitting upright on the examining table. Your healthcare provider slowly brings your head down over the edge of the table and turns your head to one side. If you have positional vertigo, your provider will see your eyes making fast, jerky movements called nystagmus. If no nystagmus is seen, your provider will repeat the test, this time turning your head to the opposite side, to test the other inner ear. If you then have nystagmus and vertigo, the ear that is pointing toward the floor is the one causing the problem. The nystagmus and vertigo will slow down and stop after 15 to 20 seconds. If you do not move your head, no more symptoms will occur. When you sit back up, you will have vertigo again, but for a shorter time.   Other tests you may have are:   an ear exam   an audiogram to check your hearing   a test of your nerve responses   an electronystagmogram (ENG) test.   How is it treated?   Mild vertigo is often treated with medicine. The  most common medicine for this problem is meclizine. It is taken up to 4 times a day for the vertigo and nausea or vomiting. One of the problems with this medicine is that it causes drowsiness. This is not as much of a problem if you have severe vertigo, which usually requires bed rest. Then the medicine can help you sleep and get relief from the vertigo while you sleep.   Your healthcare provider may recommend techniques that use gravity to move the crystals away from the nerve endings into an area of the inner ear that won't cause any problems. These are called repositioning techniques.   One repositioning technique is the Epley maneuver. It can be very helpful. Your healthcare provider will move your head into 4 positions. You will hold each position for about 30 seconds.   Your healthcare provider may also suggest that you do Antonio-Daroff exercises. Your provider may recommend that you do these exercises 3 times a day for 2 weeks. To do these exercises:   Start by sitting upright on your bed.   Lie on your left side, with your head angled upward about USP. (Imagine that you are looking at the head of someone standing about 6 feet in front of you.) Stay in this position for 30 seconds, or, if you are having vertigo, until the vertigo stops.   Return to the sitting position for 30 seconds.   Lie on your right side, and follow the same routine.   Your healthcare provider may refer you to a physical therapist to learn and practice these repositioning techniques.   Rarely, when repositioning techniques don't help and the vertigo has not gone away after a few weeks, severe cases may eventually require surgery.   How long will the effects last?   Even without treatment, positional vertigo usually goes away within several weeks. Sometimes it recurs despite treatment.   How do I take care of myself?   If your vertigo is mild, you may be able to continue your usual activities, especially if you have opportunities to  "sit and rest when you have vertigo.   If your vertigo does not allow you to continue your usual routine, you should rest at home.   Use medicine as prescribed by your healthcare provider to help stop symptoms of dizziness, nausea, and vomiting.   Follow your instructions for using the repositioning techniques.   Do not try to drive, operate tools or machinery, or do other tasks, even cooking, that could endanger yourself or others if you suddenly become dizzy.   Follow your healthcare provider's recommendations for follow-up visits.   Contact your healthcare provider if:   Your symptoms seem to be getting worse, more frequent, or longer lasting.   You develop new symptoms, such as a loss of hearing or severe headache.     Published by [employer].  This content is reviewed periodically and is subject to change as new health information becomes available. The information is intended to inform and educate and is not a replacement for medical evaluation, advice, diagnosis or treatment by a healthcare professional.   Developed by [employer].   ? 2010 [employer] and/or its affiliates. All Rights Reserved.   Copyright   [employer] Reference Systems 2011  Adult Health Advisor                      Dizziness  What is dizziness?   Dizziness is often used to describe different symptoms. It can mean that you feel unsteady or woozy. Dizziness that feels like either you are spinning or the room is spinning is called vertigo. It is important for you to explain to your provider what you mean when you say you are feeling dizzy. The 2 most common conditions referred to by the term \"dizziness\" are vertigo and light headed ness.   Vertigo is a loss of balance. You cannot stand upright. In fact, you can't tell which way \"up\" is. This loss of balance often comes with nausea and vomiting and sometimes sweating. Often, even as you lie in bed, the room seems to be spinning around you. It can be very disabling for days or weeks. " "  Lightheadedness is the feeling when you stand up too fast that you might faint. Sometimes the room even \"goes black\" when, for a few seconds, there's not enough oxygen in your brain.   How does it occur?   Dizziness is a symptom, not a disease. Most often it is mild and doesn't last long, and a cause is not found. Sometimes it is a sign of another problem.   Feelings of vertigo may be caused by an infection or disease in the inner ear. For example, one possible cause is inflammation of the inner ear called labyrinthitis. Other inner ear problems that can trigger vertigo are M?ni?re's disease and positional vertigo.   Lightheadedness can be caused by tiredness, stress, fever, dehydration, low blood sugar, low blood pressure, anemia, head injury, heart or circulation problems, or stroke. It can also be caused by some medicines.   As you get older, you may have atherosclerosis (hardening of the arteries) or osteoarthritis of the joints in the neck. These diseases may cause vertigo or lightheadedness when you suddenly move your head or bend your head back. Dizziness of both kinds happens more often in older adults than at other ages but it is not always caused by disease.   Some mental health problems can cause lightheadedness. For example, anxiety might cause hyperventilation (rapid, shallow breathing), which can make you feel lightheaded. Some people even faint from hyperventilating.   Less common causes of dizziness are tumors or infections in the brain, or multiple sclerosis (MS).   How is it diagnosed?   Your healthcare provider will ask you to describe your dizziness and how it happens in as much detail as you can. Your provider will want to know about any other symptoms or medical problems you are having. Your provider may see if you get dizzy again by doing things that may have caused your dizziness, such as rapid breathing.   Your provider will examine your ears, eyes, and nervous system. You may have a CT or " MRI scan of the brain to look for something that might be causing dizziness, such as a tumor, stroke, or multiple sclerosis.   How is it treated?   The treatment depends on the cause of the dizziness. If your healthcare provider finds a problem that is causing the dizziness, you will be treated for the problem. For example, if you have M?ni?re's disease, your provider may recommend a low-salt diet to decrease any swelling in your inner ear. You may also be given steroids (Prednisone) to decrease swelling and inflammation. If your provider thinks you have a bacterial infection, he or she may prescribe antibiotics. In some cases antiviral medicines may be prescribed.   Your provider may also prescribe medicine for the balance mechanism in your inner ear. This medicine is usually the same medicine you might take for motion sickness, such as meclizine (Antivert). The medicine decreases the feeling of vertigo, but it can make you sleepy.   How long will the dizziness last?   Depending on the cause, mild vertigo usually lasts no longer than 1 to 2 weeks. More severe vertigo can last several weeks. With M?ni?re's disease, the vertigo may come and go, or it might become an ongoing problem.   Lightheadedness usually lasts only a few seconds or maybe a minute. Depending on the cause, it may happen occasionally or every time you stand up. In most cases when your healthcare provider determines and treats the cause of your lightheadedness, it goes away. Sometimes, especially if you have a number of medical problems and are taking several medicines, it can be a challenge to find the cause and cure for the lightheadedness. It is important to work with your provider and to let him or her know what is working and what is not. It is important to treat the lightheadedness to prevent falls.   How can I take care of myself?   The best thing to do when you are feeling dizzy is lie down, relax, and wait for the dizziness to go away.   Try  to avoid positions or activities that cause the dizziness. Move slowly, especially when you stand up.   If you become dizzy while you are driving, pull over to the side of the road and wait until the dizziness goes away. Do not drive a car or run machinery when you are dizzy.   If you smoke, stop. If someone else in your household smokes, ask them to smoke outside. Smoking can increase dizziness.   Call 911 right away if:   You suddenly have other symptoms, such as double vision, blindness, or numbness or weakness on one side of your face or body. These symptoms with dizziness might mean you are having a stroke, and strokes need to be treated right away. Some strokes can be prevented by treatment within 3 hours of the first symptoms.   Call your healthcare provider right away if:   You also have nausea or a cold sweat.   You have nausea and vomiting that is not helped by the medicine you were given.   Call your provider during office hours if:   You are following the recommended treatment but keep having severe, long, or repeated attacks of dizziness.   You have new symptoms along with dizziness, such as a loss of hearing.   You have other symptoms or questions that worry you.     Published by [employer].  This content is reviewed periodically and is subject to change as new health information becomes available. The information is intended to inform and educate and is not a replacement for medical evaluation, advice, diagnosis or treatment by a healthcare professional.   Developed by [employer].   ? 2010 [employer] and/or its affiliates. All Rights Reserved.   Copyright   [employer] Reference Systems 2011                    Fatigue  What is fatigue?   Fatigue is a condition of tiredness or weakness that is physical or mental, or both.   How does it occur?   Fatigue can happen for many reasons, but it is especially likely when you are having a lot of physical or mental stress. Fatigue may be caused by:   an illness    hormone problems, such as thyroid problems   overexertion   poor physical condition   lack of exercise   not enough sleep   overweight   poor diet   stress   emotional or psychological problems, especially depression   some medicines.   Fatigue can also be a symptom of a heart attack, especially in women. In this case, it usually is new and is severe fatigue that starts a day or two or just a few hours before a heart attack. Sometimes the fatigue starts a couple of weeks before a heart attack. Because new, unexplained fatigue can mean a heart attack is about to happen, it should be checked by your healthcare provider.   Overwhelming fatigue that lasts for at least 6 months and interferes with your daily life may be caused by a medical problem called chronic fatigue syndrome.   What are the symptoms?   Symptoms of fatigue are:   weakness   tiredness   indifference   lack of energy   lack of your usual endurance or stamina.   How is it diagnosed?   Your healthcare provider will review your symptoms and ask about your daily routine, work habits, environment, and emotional well-being. Your provider may examine you. You may have blood tests to check for diseases that can cause fatigue, such as diabetes, hypothyroidism, heart disease, lung disease, and anemia.   How is it treated?   The treatment depends on the cause. If fatigue is a symptom of another condition or illness, such as thyroid problems, that condition or disease will be the focus of treatment. If the cause is emotional or psychological, your healthcare provider may  you or refer you to a therapist for evaluation and counseling.   If new fatigue is caused by worsening heart health, prompt recognition and treatment of heart disease may prevent a heart attack.   How long do the effects last?   The effects will last as long as the cause of the symptoms exists.   How can I take care of myself?   Get enough rest and sleep.   Eat a healthy diet. If you are  overweight, begin a weight loss program after checking with your healthcare provider.   Walk or exercise according to your healthcare provider's recommendations. Exercise can increase your energy, improve your mood, and help you sleep better.   See a counselor if you are having emotional problems.   Learn to use deep breathing techniques, visualization, and meditation to relieve stress.   Allow yourself time to relax and do things you enjoy.   Meet new people and develop new interests.   How can I prevent fatigue?   If you are working longer hours or doing more physical work, allow yourself more time to sleep or rest.   If your work activity has become more strenuous, take breaks during the day to sit and rest.   Ask your provider about taking vitamin and mineral supplements.   Consider eating smaller meals 4 to 6 times a day if that seems to help you maintain a higher energy level. Eat more complex carbohydrates, such as whole grain rice and pasta, and eat less fat. Avoid foods that contain a lot of sugar. Avoid overeating.   Stop smoking.   Avoid caffeine, alcohol, and other drugs. In addition to their other negative effects, they can keep you from sleeping well.     Published by [employer].  This content is reviewed periodically and is subject to change as new health information becomes available. The information is intended to inform and educate and is not a replacement for medical evaluation, advice, diagnosis or treatment by a healthcare professional.   Developed by Madelin Carrillo RN, [place], and [employer].   ? 2010 [employer] and/or its affiliates. All Rights Reserved.   Copyright   [employer] Reference Systems 2011  Adult Health Advisor              Menopause  What is menopause?   Menopause is the time when a woman stops having menstrual periods.  How does it occur?   Menopause usually starts slowly as a woman gets older. The ovaries start making less hormone (estrogen and progesterone). Menstrual periods  start being irregular. After a time, periods stop completely.   Menopause happens suddenly if the ovaries are removed.  Most women go through menopause between ages 45 and 55. In the [place] the average age when menstrual periods stop completely is 51.  What are the symptoms?   Hormonal changes can cause physical and psychological symptoms before and during menopause. The symptoms may come and go. Some women don t have any symptoms.   Common physical symptoms are:  irregular menstrual periods and finally no periods   hot flashes   night sweats   changes in your sleep patterns   dizziness   headaches   muscle and joint pain   dry skin   palpitations (a fast or irregular heartbeat)   tiredness   vaginal dryness, sometimes causing discomfort or pain during sex   more frequent need to urinate, or leakage of urine   more frequent vaginal and urinary infections   trouble sleeping.  Menopause can also cause psychological symptoms, such as:  anxiety   depression   tearfulness and irritability   mood swings   less interest in sex   lack of concentration   more trouble remembering things  How is it diagnosed?   Your healthcare provider will ask about your medical history and examine you. You may have blood tests. A pelvic exam and Pap test may show the effects of less estrogen in your body.   If you have not had a menstrual period for 12 months, you are probably in menopause.  How is it treated?   Menopause is a natural part of a woman's life. It is not a disease and does not have to be treated. However, some health problems, such as osteoporosis, are associated with low estrogen. (Osteoporosis is a thinning and weakening of the bones.) To treat menopause symptoms and help prevent osteoporosis, your healthcare provider may recommend lifestyle changes and possibly also medicine.  Treatment of menopause symptoms should start with:  regular exercise   a healthy, calcium-rich diet   a program to lower stress  Prescription medicines  "such as progesterone, clonidine, or an anxiety medicine may help treat hot flashes. Some women have found ginseng root and vitamin E to be helpful with hot flashes, but medical studies have not yet supported this.   Estrogen-like plant substances may help. Good sources of these substances are soybean products, other beans, rhubarb, carrots, and whole grains. Examples of soybean products include soy milk, tofu, roasted soybeans (\"soy nuts\"), and soy flour. Soy products can act like estrogen in the body. Tell your healthcare provider if you eat a lot of these foods or are taking supplements or herbal remedies.  Things you can do to keep your bones healthy are  Get regular, weight-bearing exercise, such as walking.   Make sure that you have enough calcium and vitamin D in your diet. Your healthcare provider may recommend calcium supplements with vitamin D.   Take medicine prescribed by your healthcare provider for osteoporosis.  Treatment with estrogen and progesterone may be prescribed to treat symptoms of menopause if other treatments are not helping enough. This treatment is called estrogen therapy or hormone replacement therapy (HRT). In addition to treating menopausal symptoms, HRT can help prevent bone loss (osteoporosis).   Hormone therapy may increase your risk for heart disease. It may also increase your risk for stroke, breast cancer, blood clots, some gallbladder problems, and possibly dementia. Also, estrogen taken without progesterone increases the risk of uterine cancer if your uterus has not been removed. Discuss the risks and benefits of hormone therapy with your healthcare provider.  If you are going to take hormone therapy, ask your healthcare provider about:  the different types and dosages of hormone therapy   any side effects or special precautions you should know about   when you should start and stop taking the hormones  Estrogen may be taken in many different forms, such as:  tablets to be " swallowed   patches or lotion to be put on the skin   a cream, ring, or tablet put into the vagina   pellets placed under the skin   shots  If you still have your uterus and choose to take hormones, you will need to take progesterone with the estrogen. Taking estrogen alone may increase your risk of cancer of the uterus. The risk of cancer of the uterus may be less if you are using a vaginal form of estrogen treatment. Vaginal estrogen treatment is often prescribed for vaginal atrophy symptoms. It may not help as much as oral or skin treatments for the other symptoms of menopause.  If your uterus has been removed, you can take estrogen alone.  How long will the effects last?   Symptoms of menopause may last just a few weeks or months. Some women keep having symptoms for several years.  How can I take care of myself?   To help your general mental and physical well-being, you should:  Have a physical exam, including a pelvic exam, every year.   Have a mammogram and Pap test as often as your provider recommends.   Eat a healthy diet. Be sure to include foods that are high in calcium, such as dark green vegetables and nonfat or low-fat dairy products.   Get regular physical exercise according to your provider's advice. Exercise will help you feel better and keep a healthy weight. Weight-bearing exercise, such as walking or stair climbing, will help keep your bones strong.   Don t smoke.   If you have had no menstrual periods for several months or years and then have bleeding from the vagina, check with your healthcare provider right away. Unusual vaginal bleeding can be a sign of a precancerous problem or cancer of the uterus.  You may find it helps to:  Wear cotton sleepwear to reduce discomfort from night sweats.   Use a vaginal lubricating cream or jelly if sex is painful.   Talk and share feelings with a friend or family member.   Join a support group for women who have been or are going through menopause.  For more  "information, contact:  [employer] Men[employer]   Phone: 814.835.3266 or [phone]  Web site: http://www.menopause.orgPRIVATE \"TYPE=PICT;ALT=                  Follow-ups after your visit        Additional Services     CARDIOLOGY PROCEDURE ADULT REFERRAL       Your provider has referred you to:   [facility]: Heart [facility] [phone]   [email or web address]    Cardiology procedures to be completed: ZioPatch , reason for procedure palpitations and dizziness     Please be aware that coverage of these services is subject to the terms and limitations of your health insurance plan.  Call member services at your health plan with any benefit or coverage questions.     Please bring the following to your appointment:  >>   Any x-rays, CTs or MRIs which have been performed.  Contact the facility where they were done to arrange for  prior to your scheduled appointment.   >>   List of current medications  >>   This referral request   >>   Any documents/labs given to you for this referral    [facility]   For scheduling questions call [phone]    [facility]  For scheduling questions call [phone]            GASTROENTEROLOGY ADULT REF PROCEDURE ONLY [facility] [phone]; [facility]       Last Lab Result: Creatinine (mg/dL)       Date                     Value                 02/14/2018               0.81             ----------  Body mass index is 27.44 kg/(m^2).      Patient will be contacted to schedule procedure.     Please be aware that coverage of these services is subject to the terms and limitations of your health insurance plan.  Call member services at your health plan with any benefit or coverage questions.  Any procedures must be performed at a [facility] facility OR coordinated by your clinic's referral office.    Please bring the following with you to your " "appointment:    (1) Any X-Rays, CTs or MRIs which have been performed.  Contact the facility where they were done to arrange for  prior to your scheduled appointment.    (2) List of current medications   (3) This referral request   (4) Any documents/labs given to you for this referral            PHYSICAL THERAPY REFERRAL       *This therapy referral will be filtered to a centralized scheduling office at [facility] and the patient will receive a call to schedule an appointment at a [place] location most convenient for them. *     [facility] provides Physical Therapy evaluation and treatment and many specialty services across the [place] system.  If requesting a specialty program, please choose from the list below.    If you have not heard from the scheduling office within 2 business days, please call [phone] for all locations, with the exception of [place], please call [phone] and [facility], please call [phone]  Treatment: Evaluation & Treatment  Special Instructions/Modalities: eval and treat   Special Programs: Balance/Vestibular    Please be aware that coverage of these services is subject to the terms and limitations of your health insurance plan.  Call member services at your health plan with any benefit or coverage questions.      **Note to Provider:  If you are referring outside of [facility] for the therapy appointment, please list the name of the location in the \"special instructions\" above, print the referral and give to the patient to schedule the appointment.                  Your next 10 appointments already scheduled     Nov 05, 2018  8:30 AM CST   SHORT with North Cai MD   [facility] ([facility]    [address]  [facility] [id number] [phone]              Who to contact     If you have questions or need follow up information about today's clinic visit or your schedule please " "contact [facility] directly at [phone].  Normal or non-critical lab and imaging results will be communicated to you by MyChart, letter or phone within 4 business days after the clinic has received the results. If you do not hear from us within 7 days, please contact the clinic through MyChart or phone. If you have a critical or abnormal lab result, we will notify you by phone as soon as possible.  Submit refill requests through [employer] or call your pharmacy and they will forward the refill request to us. Please allow 3 business days for your refill to be completed.          Additional Information About Your Visit        [employer] Information     [employer] gives you secure access to your electronic health record. If you see a primary care provider, you can also send messages to your care team and make appointments. If you have questions, please call your primary care clinic.  If you do not have a primary care provider, please call [phone] and they will assist you.        Care EveryWhere ID     This is your Care EveryWhere ID. This could be used by other organizations to access your [facility] medical records  [id number]        Your Vitals Were     Pulse Temperature Height Last Period Pulse Oximetry BMI (Body Mass Index)    69 98.2  F (36.8  C) (Oral) 5' 2\" (1.575 m) 06/03/2010 98% 27.44 kg/m2       Blood Pressure from Last 3 Encounters:   07/20/18 128/74   05/07/18 104/66   03/26/18 118/66    Weight from Last 3 Encounters:   07/20/18 150 lb (68 kg)   05/07/18 147 lb (66.7 kg)   03/26/18 147 lb 12.8 oz (67 kg)              We Performed the Following     Anaplasma phagocytoph antibody IgG IgM     Babesia antibody IgG IgM     CARDIOLOGY PROCEDURE ADULT REFERRAL     CBC with platelets differential     Comprehensive metabolic panel     CRP inflammation     Ehrlichia chaffeenis Abys IgG and IgM     EKG 12-lead complete w/read - Clinics     Erythrocyte sedimentation rate auto     Estradiol     " Follicle stimulating hormone     GASTROENTEROLOGY ADULT REF PROCEDURE ONLY [facility] [phone]; [facility]     HIV Screening     Insulin level     Lutropin     Lyme Disease Hansa with reflex to WB Serum     PHYSICAL THERAPY REFERRAL     Progesterone     Testosterone total     TSH with free T4 reflex          Today's Medication Changes          These changes are accurate as of 7/20/18  3:26 PM.  If you have any questions, ask your nurse or doctor.               These medicines have changed or have updated prescriptions.        Dose/Directions    hydrocortisone 2.5 % cream   This may have changed:    - when to take this  - reasons to take this  - additional instructions   Used for:  Blepharitis of right upper eyelid, unspecified type        Apply very small amount to eyelids up to twice daily   Quantity:  20 g   Refills:  3                Primary Care Provider Office Phone # Fax #    Phuong Story [phone]       [address] [place] [address]        Equal Access to Services     [facility] : Hadii aad ku hadasho Sobib, waaxda luqadaha, qaybta kaalmada adeegyada, papito jaramillo . So [facility] [phone].    ATENCIÓN: Si habla español, tiene a ruiz disposición servicios gratuitos de asistencia lingüística. Llame al [phone].    We comply with applicable federal civil rights laws and [place] laws. We do not discriminate on the basis of race, color, national origin, age, disability, sex, sexual orientation, or gender identity.            Thank you!     Thank you for choosing [facility]  for your care. Our goal is always to provide you with excellent care. Hearing back from our patients is one way we can continue to improve our services. Please take a few minutes to complete the written survey that you may receive in the mail after your visit with us. Thank you!             Your Updated Medication List - Protect  others around you: Learn how to safely use, store and throw away your medicines at www.disposemymeds.org.          This list is accurate as of 7/20/18  3:26 PM.  Always use your most recent med list.                   Brand Name Dispense Instructions for use Diagnosis    calcium carbonate 500 MG tablet    OS-PACO 500 mg [place]. [place]     Take 2 tablets by mouth daily        hydrocortisone 2.5 % cream     20 g    Apply very small amount to eyelids up to twice daily    Blepharitis of right upper eyelid, unspecified type       ibuprofen 600 MG tablet    ADVIL/MOTRIN    30 tablet    Take 1 tablet (600 mg) by mouth every 6 hours as needed for pain (mild)    Post-operative state       loratadine 10 MG tablet    CLARITIN    3 MONTHS    ONE DAILY        melatonin 1 MG/ML Liqd liquid      Take 3 mg by mouth nightly as needed for sleep        multivitamin per tablet     100 tablet    Take 1 tablet by mouth daily.    Routine general medical examination at a health care facility       omega 3 1000 MG Caps     90 Cap    once daily    Urticaria, Hives       RANITIDINE HCL PO      Take 150 mg by mouth At Bedtime        vitamin D3 2000 units Caps      1 capsule daily - takes only intermittently    Vitamin D deficiency
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
## 2018-07-20 NOTE — PATIENT INSTRUCTIONS
Look at your inflammatory foods:avoid or eliminated  processed flours, processed sugars.  For anti-inflammatory foods: Try to stick with dark green leafy veggies, organic foods, beets, walnuts, green tea, salmon, blueberries, pomegranates, olive oil, fish oil, dark red tart cherries.      Positional Vertigo          What is positional vertigo?   Positional vertigo is an inner ear problem. It causes brief but sometimes severe feelings of spinning. Some people feel that their head or body is spinning. Others feel the room is spinning. People often say they are dizzy, but dizzy is a very general term. Vertigo, on the other hand, is the very specific feeling of uncontrollable spinning.   Symptoms of positional vertigo happen suddenly when you change the position of your head.   How does it occur?   In the inner part of your ear are 3 semicircular canals. Movement of the fluid in these canals helps your brain maintain your balance and know what position you are in (for example, standing up, lying down, or standing on your head).   Sometimes small crystals of calcium develop and float in the fluid in the inner ear. This can happen after a head injury, with a severe cold, or simply as a part of normal aging. The crystals can cause vertigo when you change head position and they strike against nerve endings in the semicircular canals. Usually the calcium crystals dissolve in a few weeks and stop causing vertigo. However, sometimes the crystals do not dissolve and the vertigo returns from time to time.   What are the symptoms?   A sudden feeling that you are spinning, or that the room is spinning, is the main symptom. You may feel the vertigo when you first wake up. It may seem that any turn of your head brings on brief but intense spells of vertigo. It may happen when you tilt your head, look up or down, or roll over in bed.   You may have nausea and vomiting along with the vertigo. Even if a spell of vertigo is  brief, you may have a feeling of queasiness for several minutes or even hours afterward.   How is it diagnosed?   Your healthcare provider will ask about your symptoms and examine you. You may also be given a Teri-Hallpike position test.   You start the Teri-Hallpike test by sitting upright on the examining table. Your healthcare provider slowly brings your head down over the edge of the table and turns your head to one side. If you have positional vertigo, your provider will see your eyes making fast, jerky movements called nystagmus. If no nystagmus is seen, your provider will repeat the test, this time turning your head to the opposite side, to test the other inner ear. If you then have nystagmus and vertigo, the ear that is pointing toward the floor is the one causing the problem. The nystagmus and vertigo will slow down and stop after 15 to 20 seconds. If you do not move your head, no more symptoms will occur. When you sit back up, you will have vertigo again, but for a shorter time.   Other tests you may have are:   an ear exam   an audiogram to check your hearing   a test of your nerve responses   an electronystagmogram (ENG) test.   How is it treated?   Mild vertigo is often treated with medicine. The most common medicine for this problem is meclizine. It is taken up to 4 times a day for the vertigo and nausea or vomiting. One of the problems with this medicine is that it causes drowsiness. This is not as much of a problem if you have severe vertigo, which usually requires bed rest. Then the medicine can help you sleep and get relief from the vertigo while you sleep.   Your healthcare provider may recommend techniques that use gravity to move the crystals away from the nerve endings into an area of the inner ear that won't cause any problems. These are called repositioning techniques.   One repositioning technique is the Epley maneuver. It can be very helpful. Your healthcare provider will move your head into  4 positions. You will hold each position for about 30 seconds.   Your healthcare provider may also suggest that you do Antonio-Daroff exercises. Your provider may recommend that you do these exercises 3 times a day for 2 weeks. To do these exercises:   Start by sitting upright on your bed.   Lie on your left side, with your head angled upward about [facility]. (Imagine that you are looking at the head of someone standing about 6 feet in front of you.) Stay in this position for 30 seconds, or, if you are having vertigo, until the vertigo stops.   Return to the sitting position for 30 seconds.   Lie on your right side, and follow the same routine.   Your healthcare provider may refer you to a physical therapist to learn and practice these repositioning techniques.   Rarely, when repositioning techniques don't help and the vertigo has not gone away after a few weeks, severe cases may eventually require surgery.   How long will the effects last?   Even without treatment, positional vertigo usually goes away within several weeks. Sometimes it recurs despite treatment.   How do I take care of myself?   If your vertigo is mild, you may be able to continue your usual activities, especially if you have opportunities to sit and rest when you have vertigo.   If your vertigo does not allow you to continue your usual routine, you should rest at home.   Use medicine as prescribed by your healthcare provider to help stop symptoms of dizziness, nausea, and vomiting.   Follow your instructions for using the repositioning techniques.   Do not try to drive, operate tools or machinery, or do other tasks, even cooking, that could endanger yourself or others if you suddenly become dizzy.   Follow your healthcare provider's recommendations for follow-up visits.   Contact your healthcare provider if:   Your symptoms seem to be getting worse, more frequent, or longer lasting.   You develop new symptoms, such as a loss of hearing or severe  "headache.     Published by [email or web address].  This content is reviewed periodically and is subject to change as new health information becomes available. The information is intended to inform and educate and is not a replacement for medical evaluation, advice, diagnosis or treatment by a healthcare professional.   Developed by [email or web address].   ? 2010 [email or web address] and/or its affiliates. All Rights Reserved.   Copyright   [employer] Reference Systems 2011  Adult Health Advisor                      Dizziness  What is dizziness?   Dizziness is often used to describe different symptoms. It can mean that you feel unsteady or woozy. Dizziness that feels like either you are spinning or the room is spinning is called vertigo. It is important for you to explain to your provider what you mean when you say you are feeling dizzy. The 2 most common conditions referred to by the term \"dizziness\" are vertigo and light headed ness.   Vertigo is a loss of balance. You cannot stand upright. In fact, you can't tell which way \"up\" is. This loss of balance often comes with nausea and vomiting and sometimes sweating. Often, even as you lie in bed, the room seems to be spinning around you. It can be very disabling for days or weeks.   Lightheadedness is the feeling when you stand up too fast that you might faint. Sometimes the room even \"goes black\" when, for a few seconds, there's not enough oxygen in your brain.   How does it occur?   Dizziness is a symptom, not a disease. Most often it is mild and doesn't last long, and a cause is not found. Sometimes it is a sign of another problem.   Feelings of vertigo may be caused by an infection or disease in the inner ear. For example, one possible cause is inflammation of the inner ear called labyrinthitis. Other inner ear problems that can trigger vertigo are M?ni?re's disease and positional vertigo.   Lightheadedness can be caused by tiredness, stress, fever, dehydration, low blood sugar, low blood " pressure, anemia, head injury, heart or circulation problems, or stroke. It can also be caused by some medicines.   As you get older, you may have atherosclerosis (hardening of the arteries) or osteoarthritis of the joints in the neck. These diseases may cause vertigo or lightheadedness when you suddenly move your head or bend your head back. Dizziness of both kinds happens more often in older adults than at other ages but it is not always caused by disease.   Some mental health problems can cause lightheadedness. For example, anxiety might cause hyperventilation (rapid, shallow breathing), which can make you feel lightheaded. Some people even faint from hyperventilating.   Less common causes of dizziness are tumors or infections in the brain, or multiple sclerosis (MS).   How is it diagnosed?   Your healthcare provider will ask you to describe your dizziness and how it happens in as much detail as you can. Your provider will want to know about any other symptoms or medical problems you are having. Your provider may see if you get dizzy again by doing things that may have caused your dizziness, such as rapid breathing.   Your provider will examine your ears, eyes, and nervous system. You may have a CT or MRI scan of the brain to look for something that might be causing dizziness, such as a tumor, stroke, or multiple sclerosis.   How is it treated?   The treatment depends on the cause of the dizziness. If your healthcare provider finds a problem that is causing the dizziness, you will be treated for the problem. For example, if you have M?ni?re's disease, your provider may recommend a low-salt diet to decrease any swelling in your inner ear. You may also be given steroids (Prednisone) to decrease swelling and inflammation. If your provider thinks you have a bacterial infection, he or she may prescribe antibiotics. In some cases antiviral medicines may be prescribed.   Your provider may also prescribe medicine for the  balance mechanism in your inner ear. This medicine is usually the same medicine you might take for motion sickness, such as meclizine (Antivert). The medicine decreases the feeling of vertigo, but it can make you sleepy.   How long will the dizziness last?   Depending on the cause, mild vertigo usually lasts no longer than 1 to 2 weeks. More severe vertigo can last several weeks. With M?ni?re's disease, the vertigo may come and go, or it might become an ongoing problem.   Lightheadedness usually lasts only a few seconds or maybe a minute. Depending on the cause, it may happen occasionally or every time you stand up. In most cases when your healthcare provider determines and treats the cause of your lightheadedness, it goes away. Sometimes, especially if you have a number of medical problems and are taking several medicines, it can be a challenge to find the cause and cure for the lightheadedness. It is important to work with your provider and to let him or her know what is working and what is not. It is important to treat the lightheadedness to prevent falls.   How can I take care of myself?   The best thing to do when you are feeling dizzy is lie down, relax, and wait for the dizziness to go away.   Try to avoid positions or activities that cause the dizziness. Move slowly, especially when you stand up.   If you become dizzy while you are driving, pull over to the side of the road and wait until the dizziness goes away. Do not drive a car or run machinery when you are dizzy.   If you smoke, stop. If someone else in your household smokes, ask them to smoke outside. Smoking can increase dizziness.   Call 911 right away if:   You suddenly have other symptoms, such as double vision, blindness, or numbness or weakness on one side of your face or body. These symptoms with dizziness might mean you are having a stroke, and strokes need to be treated right away. Some strokes can be prevented by treatment within 3 hours of  the first symptoms.   Call your healthcare provider right away if:   You also have nausea or a cold sweat.   You have nausea and vomiting that is not helped by the medicine you were given.   Call your provider during office hours if:   You are following the recommended treatment but keep having severe, long, or repeated attacks of dizziness.   You have new symptoms along with dizziness, such as a loss of hearing.   You have other symptoms or questions that worry you.     Published by [employer].  This content is reviewed periodically and is subject to change as new health information becomes available. The information is intended to inform and educate and is not a replacement for medical evaluation, advice, diagnosis or treatment by a healthcare professional.   Developed by [employer].   ? 2010 [employer] and/or its affiliates. All Rights Reserved.   Copyright   Clinical Reference Systems 2011                    Fatigue  What is fatigue?   Fatigue is a condition of tiredness or weakness that is physical or mental, or both.   How does it occur?   Fatigue can happen for many reasons, but it is especially likely when you are having a lot of physical or mental stress. Fatigue may be caused by:   an illness   hormone problems, such as thyroid problems   overexertion   poor physical condition   lack of exercise   not enough sleep   overweight   poor diet   stress   emotional or psychological problems, especially depression   some medicines.   Fatigue can also be a symptom of a heart attack, especially in women. In this case, it usually is new and is severe fatigue that starts a day or two or just a few hours before a heart attack. Sometimes the fatigue starts a couple of weeks before a heart attack. Because new, unexplained fatigue can mean a heart attack is about to happen, it should be checked by your healthcare provider.   Overwhelming fatigue that lasts for at least 6 months and interferes with your daily life may  be caused by a medical problem called chronic fatigue syndrome.   What are the symptoms?   Symptoms of fatigue are:   weakness   tiredness   indifference   lack of energy   lack of your usual endurance or stamina.   How is it diagnosed?   Your healthcare provider will review your symptoms and ask about your daily routine, work habits, environment, and emotional well-being. Your provider may examine you. You may have blood tests to check for diseases that can cause fatigue, such as diabetes, hypothyroidism, heart disease, lung disease, and anemia.   How is it treated?   The treatment depends on the cause. If fatigue is a symptom of another condition or illness, such as thyroid problems, that condition or disease will be the focus of treatment. If the cause is emotional or psychological, your healthcare provider may  you or refer you to a therapist for evaluation and counseling.   If new fatigue is caused by worsening heart health, prompt recognition and treatment of heart disease may prevent a heart attack.   How long do the effects last?   The effects will last as long as the cause of the symptoms exists.   How can I take care of myself?   Get enough rest and sleep.   Eat a healthy diet. If you are overweight, begin a weight loss program after checking with your healthcare provider.   Walk or exercise according to your healthcare provider's recommendations. Exercise can increase your energy, improve your mood, and help you sleep better.   See a counselor if you are having emotional problems.   Learn to use deep breathing techniques, visualization, and meditation to relieve stress.   Allow yourself time to relax and do things you enjoy.   Meet new people and develop new interests.   How can I prevent fatigue?   If you are working longer hours or doing more physical work, allow yourself more time to sleep or rest.   If your work activity has become more strenuous, take breaks during the day to sit and rest.    Ask your provider about taking vitamin and mineral supplements.   Consider eating smaller meals 4 to 6 times a day if that seems to help you maintain a higher energy level. Eat more complex carbohydrates, such as whole grain rice and pasta, and eat less fat. Avoid foods that contain a lot of sugar. Avoid overeating.   Stop smoking.   Avoid caffeine, alcohol, and other drugs. In addition to their other negative effects, they can keep you from sleeping well.     Published by [employer].  This content is reviewed periodically and is subject to change as new health information becomes available. The information is intended to inform and educate and is not a replacement for medical evaluation, advice, diagnosis or treatment by a healthcare professional.   Developed by Madelin Carrillo RN, [place], and [employer].   ? 2010 [employer] and/or its affiliates. All Rights Reserved.   Copyright   Clinical Reference Systems 2011  Adult Health Advisor              Menopause  What is menopause?   Menopause is the time when a woman stops having menstrual periods.  How does it occur?   Menopause usually starts slowly as a woman gets older. The ovaries start making less hormone (estrogen and progesterone). Menstrual periods start being irregular. After a time, periods stop completely.   Menopause happens suddenly if the ovaries are removed.  Most women go through menopause between ages 45 and 55. In the [place] the average age when menstrual periods stop completely is 51.  What are the symptoms?   Hormonal changes can cause physical and psychological symptoms before and during menopause. The symptoms may come and go. Some women don t have any symptoms.   Common physical symptoms are:  irregular menstrual periods and finally no periods   hot flashes   night sweats   changes in your sleep patterns   dizziness   headaches   muscle and joint pain   dry skin   palpitations (a fast or irregular heartbeat)   tiredness   vaginal dryness,  "sometimes causing discomfort or pain during sex   more frequent need to urinate, or leakage of urine   more frequent vaginal and urinary infections   trouble sleeping.  Menopause can also cause psychological symptoms, such as:  anxiety   depression   tearfulness and irritability   mood swings   less interest in sex   lack of concentration   more trouble remembering things  How is it diagnosed?   Your healthcare provider will ask about your medical history and examine you. You may have blood tests. A pelvic exam and Pap test may show the effects of less estrogen in your body.   If you have not had a menstrual period for 12 months, you are probably in menopause.  How is it treated?   Menopause is a natural part of a woman's life. It is not a disease and does not have to be treated. However, some health problems, such as osteoporosis, are associated with low estrogen. (Osteoporosis is a thinning and weakening of the bones.) To treat menopause symptoms and help prevent osteoporosis, your healthcare provider may recommend lifestyle changes and possibly also medicine.  Treatment of menopause symptoms should start with:  regular exercise   a healthy, calcium-rich diet   a program to lower stress  Prescription medicines such as progesterone, clonidine, or an anxiety medicine may help treat hot flashes. Some women have found ginseng root and vitamin E to be helpful with hot flashes, but medical studies have not yet supported this.   Estrogen-like plant substances may help. Good sources of these substances are soybean products, other beans, rhubarb, carrots, and whole grains. Examples of soybean products include soy milk, tofu, roasted soybeans (\"soy nuts\"), and soy flour. Soy products can act like estrogen in the body. Tell your healthcare provider if you eat a lot of these foods or are taking supplements or herbal remedies.  Things you can do to keep your bones healthy are  Get regular, weight-bearing exercise, such as " walking.   Make sure that you have enough calcium and vitamin D in your diet. Your healthcare provider may recommend calcium supplements with vitamin D.   Take medicine prescribed by your healthcare provider for osteoporosis.  Treatment with estrogen and progesterone may be prescribed to treat symptoms of menopause if other treatments are not helping enough. This treatment is called estrogen therapy or hormone replacement therapy (HRT). In addition to treating menopausal symptoms, HRT can help prevent bone loss (osteoporosis).   Hormone therapy may increase your risk for heart disease. It may also increase your risk for stroke, breast cancer, blood clots, some gallbladder problems, and possibly dementia. Also, estrogen taken without progesterone increases the risk of uterine cancer if your uterus has not been removed. Discuss the risks and benefits of hormone therapy with your healthcare provider.  If you are going to take hormone therapy, ask your healthcare provider about:  the different types and dosages of hormone therapy   any side effects or special precautions you should know about   when you should start and stop taking the hormones  Estrogen may be taken in many different forms, such as:  tablets to be swallowed   patches or lotion to be put on the skin   a cream, ring, or tablet put into the vagina   pellets placed under the skin   shots  If you still have your uterus and choose to take hormones, you will need to take progesterone with the estrogen. Taking estrogen alone may increase your risk of cancer of the uterus. The risk of cancer of the uterus may be less if you are using a vaginal form of estrogen treatment. Vaginal estrogen treatment is often prescribed for vaginal atrophy symptoms. It may not help as much as oral or skin treatments for the other symptoms of menopause.  If your uterus has been removed, you can take estrogen alone.  How long will the effects last?   Symptoms of menopause may last  "just a few weeks or months. Some women keep having symptoms for several years.  How can I take care of myself?   To help your general mental and physical well-being, you should:  Have a physical exam, including a pelvic exam, every year.   Have a mammogram and Pap test as often as your provider recommends.   Eat a healthy diet. Be sure to include foods that are high in calcium, such as dark green vegetables and nonfat or low-fat dairy products.   Get regular physical exercise according to your provider's advice. Exercise will help you feel better and keep a healthy weight. Weight-bearing exercise, such as walking or stair climbing, will help keep your bones strong.   Don t smoke.   If you have had no menstrual periods for several months or years and then have bleeding from the vagina, check with your healthcare provider right away. Unusual vaginal bleeding can be a sign of a precancerous problem or cancer of the uterus.  You may find it helps to:  Wear cotton sleepwear to reduce discomfort from night sweats.   Use a vaginal lubricating cream or jelly if sex is painful.   Talk and share feelings with a friend or family member.   Join a support group for women who have been or are going through menopause.  For more information, contact:  [employer]   Phone: [phone] or [phone]  Web site: http://www.menopause.orgPRIVATE \"TYPE=PICT;ALT=          "

## 2018-07-20 NOTE — PROGRESS NOTES
"  SUBJECTIVE:                                                    Edwina Zhou is a 63 year old female who presents to clinic today for the following health issues:    Dizziness  Onset: ***    Description:   Do you feel faint:  { :884772}  Does it feel like the surroundings (bed, room) are moving: { :929980}  Unsteady/off balance: { :538998}  Have you passed out or fallen: { :407540}    Intensity: {.:260687}    Progression of Symptoms:  {.:508251}    Accompanying Signs & Symptoms:  Heart palpitations: { :771072}  Nausea, vomiting: { :289684}  Weakness in arms or legs: { :457649}  Fatigue: { :196485}  Vision or speech changes: { :259486}  Ringing in ears (Tinnitus): { :425578}  Hearing Loss: { :016933}    History:   Head trauma/concussion hx: { :498080}  Previous similar symptoms: { :793606}  Recent bleeding history: { :247752}    Precipitating factors:   Worse with activity or head movement: { :386414}  Any new medications (BP?): { :059460}  Alcohol/drug abuse/withdrawal: { :516276}    Alleviating factors:   Does staying in a fixed position give relief:  { :593287}    Therapies Tried and outcome: ***    Problem list and histories reviewed & adjusted, as indicated.  Additional history: as documented    Reviewed and updated as needed this visit by clinical staff       Reviewed and updated as needed this visit by Provider         ROS:   ROS: 12 point ROS neg other than the symptoms noted above    OBJECTIVE:                                                    LMP 06/03/2010  There is no height or weight on file to calculate BMI.   {EXAM - NORMAL- condensed - partially selected:424313::\"GENERAL: healthy, alert, well nourished, well hydrated, no distress\",\"HENT: ear canals- normal; TMs- normal; Nose- normal; Mouth- no ulcers, no lesions\",\"NECK: no tenderness, no adenopathy, no asymmetry, no masses, no stiffness; thyroid- normal to palpation\",\"RESP: lungs clear to auscultation - no rales, no rhonchi, no wheezes\",\"CV: " "regular rates and rhythm, normal S1 S2, no S3 or S4 and no murmur, no click or rub -\",\"ABDOMEN: soft, no tenderness, no  hepatosplenomegaly, no masses, normal bowel sounds\"}    {Diagnostic Test Results:568248}     ASSESSMENT/PLAN:                                                    No diagnosis found.    {FOLLOW UP CLINIC OPTIONS:544161}         Phuong Story MD    [facility]    "

## 2018-07-21 LAB
ALBUMIN SERPL-MCNC: 3.7 G/DL (ref 3.4–5)
ALP SERPL-CCNC: 61 U/L (ref 40–150)
ALT SERPL W P-5'-P-CCNC: 33 U/L (ref 0–50)
ANION GAP SERPL CALCULATED.3IONS-SCNC: 8 MMOL/L (ref 3–14)
AST SERPL W P-5'-P-CCNC: 26 U/L (ref 0–45)
BILIRUB SERPL-MCNC: 0.2 MG/DL (ref 0.2–1.3)
BUN SERPL-MCNC: 18 MG/DL (ref 7–30)
CALCIUM SERPL-MCNC: 9.1 MG/DL (ref 8.5–10.1)
CHLORIDE SERPL-SCNC: 105 MMOL/L (ref 94–109)
CO2 SERPL-SCNC: 27 MMOL/L (ref 20–32)
CREAT SERPL-MCNC: 0.76 MG/DL (ref 0.52–1.04)
CRP SERPL-MCNC: <2.9 MG/L (ref 0–8)
ERYTHROCYTE [SEDIMENTATION RATE] IN BLOOD BY WESTERGREN METHOD: 13 MM/H (ref 0–30)
ESTRADIOL SERPL-MCNC: <11 PG/ML
FSH SERPL-ACNC: 76.6 IU/L
GFR SERPL CREATININE-BSD FRML MDRD: 78 ML/MIN/1.7M2
GLUCOSE SERPL-MCNC: 89 MG/DL (ref 70–99)
INSULIN SERPL-ACNC: 24.7 MU/L (ref 3–25)
LH SERPL-ACNC: 29 IU/L
POTASSIUM SERPL-SCNC: 4.3 MMOL/L (ref 3.4–5.3)
PROGEST SERPL-MCNC: <0.2 NG/ML
PROT SERPL-MCNC: 7.2 G/DL (ref 6.8–8.8)
SODIUM SERPL-SCNC: 140 MMOL/L (ref 133–144)
TSH SERPL DL<=0.005 MIU/L-ACNC: 3.8 MU/L (ref 0.4–4)

## 2018-07-23 LAB
B BURGDOR IGG+IGM SER QL: 0.11 (ref 0–0.89)
B MICROTI IGG TITR SER: NORMAL {TITER}
B MICROTI IGM TITR SER: NORMAL {TITER}
HIV 1+2 AB+HIV1 P24 AG SERPL QL IA: NONREACTIVE

## 2018-07-24 LAB
A PHAGOCYTOPH IGG TITR SER IF: NORMAL {TITER}
A PHAGOCYTOPH IGM TITR SER IF: NORMAL {TITER}
E CHAFFEENSIS IGG TITR SER: NORMAL {TITER}
E CHAFFEENSIS IGM TITR SER: NORMAL {TITER}
TESTOST SERPL-MCNC: 13 NG/DL (ref 8–60)

## 2018-08-20 ENCOUNTER — HOSPITAL ENCOUNTER (OUTPATIENT)
Dept: CARDIOLOGY | Facility: CLINIC | Age: 63
Discharge: HOME | End: 2018-08-20
Attending: FAMILY MEDICINE | Admitting: FAMILY MEDICINE
Payer: COMMERCIAL

## 2018-08-20 DIAGNOSIS — R42 DIZZINESS: ICD-10-CM

## 2018-08-20 DIAGNOSIS — R00.2 PALPITATIONS: ICD-10-CM

## 2018-08-20 DIAGNOSIS — R06.02 SOB (SHORTNESS OF BREATH): ICD-10-CM

## 2018-08-20 DIAGNOSIS — H81.13 BENIGN PAROXYSMAL POSITIONAL VERTIGO DUE TO BILATERAL VESTIBULAR DISORDER: ICD-10-CM

## 2018-08-20 PROCEDURE — 0296T ZIO PATCH HOLTER: CPT

## 2018-08-20 PROCEDURE — 0298T ZZC EXT ECG > 48HR TO 21 DAY REVIEW AND INTERPRETATN: CPT | Performed by: INTERNAL MEDICINE

## 2018-09-26 ENCOUNTER — OFFICE VISIT (OUTPATIENT)
Dept: FAMILY MEDICINE | Facility: CLINIC | Age: 63
End: 2018-09-26
Payer: COMMERCIAL

## 2018-09-26 VITALS
DIASTOLIC BLOOD PRESSURE: 68 MMHG | TEMPERATURE: 98.6 F | BODY MASS INDEX: 29.15 KG/M2 | HEIGHT: 62 IN | OXYGEN SATURATION: 98 % | SYSTOLIC BLOOD PRESSURE: 108 MMHG | HEART RATE: 89 BPM | WEIGHT: 158.38 LBS

## 2018-09-26 DIAGNOSIS — J20.9 ACUTE BRONCHITIS WITH SYMPTOMS > 10 DAYS: Primary | ICD-10-CM

## 2018-09-26 DIAGNOSIS — J98.01 ACUTE BRONCHOSPASM: ICD-10-CM

## 2018-09-26 DIAGNOSIS — K20.0 EOSINOPHILIC ESOPHAGITIS: ICD-10-CM

## 2018-09-26 DIAGNOSIS — R05.9 COUGH: ICD-10-CM

## 2018-09-26 PROCEDURE — 86615 BORDETELLA ANTIBODY: CPT | Mod: 90 | Performed by: PHYSICIAN ASSISTANT

## 2018-09-26 PROCEDURE — 99000 SPECIMEN HANDLING OFFICE-LAB: CPT | Performed by: PHYSICIAN ASSISTANT

## 2018-09-26 PROCEDURE — 99214 OFFICE O/P EST MOD 30 MIN: CPT | Performed by: PHYSICIAN ASSISTANT

## 2018-09-26 PROCEDURE — 36415 COLL VENOUS BLD VENIPUNCTURE: CPT | Performed by: PHYSICIAN ASSISTANT

## 2018-09-26 RX ORDER — AZITHROMYCIN 250 MG/1
TABLET, FILM COATED ORAL
Qty: 6 TABLET | Refills: 0 | Status: SHIPPED | OUTPATIENT
Start: 2018-09-26 | End: 2018-10-03

## 2018-09-26 RX ORDER — METHYLPREDNISOLONE 4 MG
TABLET, DOSE PACK ORAL
Qty: 21 TABLET | Refills: 0 | Status: SHIPPED | OUTPATIENT
Start: 2018-09-26 | End: 2018-10-03
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
## 2018-09-26 NOTE — PATIENT INSTRUCTIONS
Eosinophilic Esophagitis (EoE)  Eosinophilic esophagitis (EoE) is an allergic reaction. It occurs in the esophagus. This is the tube that leads from the mouth down to the stomach. The immune system responds to an allergen by making white blood cells called eosinophils. The esophagus then becomes red and swollen. EoE is much more common in people with asthma or allergies.  What causes eosinophilic esophagitis?  Researchers are working to understand the causes of EoE. It may be caused by allergens in the environment. Or it may be caused by food allergens. Foods that are often found to be the cause of EoE include wheat, dairy foods, eggs, and soy. You may be at higher risk for EoE if you have a family history of allergies or EoE as there may be a genetic link.  Symptoms of eosinophilic esophagitis  Symptoms of EoE vary from person to person and may include:    Trouble swallowing or painful swallowing    Chest pain    Pain in the belly (abdomen)    Vomiting    Food getting stuck in the throat (a medical emergency)  Diagnosing eosinophilic esophagitis  Your healthcare provider will ask about your health history and symptoms. He or she will likely want to test you for allergies. You may have an endoscopy. A thin, flexible tube (endoscope) is passed through your mouth and down your throat. The scope has a camera. This lets your healthcare provider look at your esophagus. The doctor will check for signs of inflammation and an increased number of eosinophils. You may have a biopsy during the procedure. This is a small tissue sample taken from your esophagus. In some situations, stretching (dilation) will be performed if the esophagus is narrowed.  Other diseases can cause eosinophils in the esophagus. These include gastroesophageal reflux disease (GERD) and inflammatory bowel disease. Your healthcare provider will check for these.  Treatment for eosinophilic esophagitis  You will likely work with special doctors for  treatment. You may see an allergy doctor. And you may see a doctor who treats digestive problems (gastroenterologist). Your treatment may include:    Medicine. No medicines can cure EoE. but some can help reduce the redness and swelling. These include corticosteroids and proton pump inhibitors.    Elimination diet. Not eating certain foods can also help reduce the redness and swelling in your esophagus. These may include dairy foods, eggs, wheat, soy, peanuts, tree nuts, and fish. Your healthcare team will give you a plan for finding out what foods may cause your symptoms. This will likely include an elimination diet. On this type of diet, you eat very few foods at first. You then slowly add more foods to see if you have a reaction. An EoE reaction may take days or weeks to develop. Keep this in mind when starting a food elimination diet. It may take some time after avoiding a food to see if that strategy worked.  Living with EoE  You can help manage EoE by learning what things cause your allergic reaction. You will need to avoid these things ongoing to prevent symptoms of EoE. If medicines are prescribed, take them as directed. If you have difficulty swallowing, your healthcare provider can recommend certain eating habits to improve your symptoms. Repeat endoscopy is sometimes needed to check for improvement in the esophagus.  When to call your healthcare provider  Call your healthcare provider if you have any of the following:    Chest pain    Unexplained weight loss    Vomiting   When to call 911  Call 911 if you have:    Food stuck in your throat    Trouble breathing or talking    Trouble handling your spit or secretions   Date Last Reviewed: 8/1/2017 2000-2017 [employer]. [address] [place] [address], [place] [address]. All rights reserved. This information is not intended as a substitute for professional medical care. Always follow your healthcare professional's instructions.

## 2018-09-26 NOTE — LETTER
[facility]  [address] [place] [id number]                                                                                                       [phone]    September 26, 2018    Edwina Parsons Zhou  [address] [facility] [place] [address]      To Whom it May Concern:    The above patient is unable to attend work 9/26/2018-9/27/2018 due to a medical issue. Please contact me with questions or concerns.      Sincerely,    Martita Armendariz PA-C

## 2018-09-26 NOTE — MR AVS SNAPSHOT
After Visit Summary   9/26/2018    Edwina Zhou    MRN: [id number]           Patient Information     Date Of Birth          1955        Visit Information        Provider Department      9/26/2018 1:40 PM Martita Armendariz PA-C [facility]        Today's Diagnoses     Acute bronchitis with symptoms > 10 days    -  1    Cough        Acute bronchospasm        Eosinophilic esophagitis          Care Instructions      Eosinophilic Esophagitis (EoE)  Eosinophilic esophagitis (EoE) is an allergic reaction. It occurs in the esophagus. This is the tube that leads from the mouth down to the stomach. The immune system responds to an allergen by making white blood cells called eosinophils. The esophagus then becomes red and swollen. EoE is much more common in people with asthma or allergies.  What causes eosinophilic esophagitis?  Researchers are working to understand the causes of EoE. It may be caused by allergens in the environment. Or it may be caused by food allergens. Foods that are often found to be the cause of EoE include wheat, dairy foods, eggs, and soy. You may be at higher risk for EoE if you have a family history of allergies or EoE as there may be a genetic link.  Symptoms of eosinophilic esophagitis  Symptoms of EoE vary from person to person and may include:    Trouble swallowing or painful swallowing    Chest pain    Pain in the belly (abdomen)    Vomiting    Food getting stuck in the throat (a medical emergency)  Diagnosing eosinophilic esophagitis  Your healthcare provider will ask about your health history and symptoms. He or she will likely want to test you for allergies. You may have an endoscopy. A thin, flexible tube (endoscope) is passed through your mouth and down your throat. The scope has a camera. This lets your healthcare provider look at your esophagus. The doctor will check for signs of inflammation and an increased number of eosinophils. You may have a  biopsy during the procedure. This is a small tissue sample taken from your esophagus. In some situations, stretching (dilation) will be performed if the esophagus is narrowed.  Other diseases can cause eosinophils in the esophagus. These include gastroesophageal reflux disease (GERD) and inflammatory bowel disease. Your healthcare provider will check for these.  Treatment for eosinophilic esophagitis  You will likely work with special doctors for treatment. You may see an allergy doctor. And you may see a doctor who treats digestive problems (gastroenterologist). Your treatment may include:    Medicine. No medicines can cure EoE. but some can help reduce the redness and swelling. These include corticosteroids and proton pump inhibitors.    Elimination diet. Not eating certain foods can also help reduce the redness and swelling in your esophagus. These may include dairy foods, eggs, wheat, soy, peanuts, tree nuts, and fish. Your healthcare team will give you a plan for finding out what foods may cause your symptoms. This will likely include an elimination diet. On this type of diet, you eat very few foods at first. You then slowly add more foods to see if you have a reaction. An EoE reaction may take days or weeks to develop. Keep this in mind when starting a food elimination diet. It may take some time after avoiding a food to see if that strategy worked.  Living with EoE  You can help manage EoE by learning what things cause your allergic reaction. You will need to avoid these things ongoing to prevent symptoms of EoE. If medicines are prescribed, take them as directed. If you have difficulty swallowing, your healthcare provider can recommend certain eating habits to improve your symptoms. Repeat endoscopy is sometimes needed to check for improvement in the esophagus.  When to call your healthcare provider  Call your healthcare provider if you have any of the following:    Chest pain    Unexplained weight  loss    Vomiting   When to call 911  Call 911 if you have:    Food stuck in your throat    Trouble breathing or talking    Trouble handling your spit or secretions   Date Last Reviewed: 8/1/2017 2000-2017 [employer]. [address] [place] [address], [place] [address]. All rights reserved. This information is not intended as a substitute for professional medical care. Always follow your healthcare professional's instructions.                Follow-ups after your visit        Follow-up notes from your care team     Return if symptoms worsen or fail to improve.      Your next 10 appointments already scheduled     Nov 05, 2018  2:00 PM CST   SHORT with [place] Cai MD   [facility]    [address]  [address] [place] [id number] [phone]              Who to contact     If you have questions or need follow up information about today's clinic visit or your schedule please contact [facility] directly at [phone].  Normal or non-critical lab and imaging results will be communicated to you by Car reviewshart, letter or phone within 4 business days after the clinic has received the results. If you do not hear from us within 7 days, please contact the clinic through [employer] or phone. If you have a critical or abnormal lab result, we will notify you by phone as soon as possible.  Submit refill requests through "[employer] or call your pharmacy and they will forward the refill request to us. Please allow 3 business days for your refill to be completed.          Additional Information About Your Visit        Car reviewshart Information     "[employer] gives you secure access to your electronic health record. If you see a primary care provider, you can also send messages to your care team and make appointments. If you have questions, please call your primary care clinic.  If you do not have a primary care provider, please call [phone] and they will  "assist you.        Care EveryWhere ID     This is your Care EveryWhere ID. This could be used by other organizations to access your [facility] medical records  [id number]        Your Vitals Were     Pulse Temperature Height Last Period Pulse Oximetry BMI (Body Mass Index)    89 98.6  F (37  C) (Tympanic) 5' 2\" (1.575 m) 06/03/2010 98% 28.97 kg/m2       Blood Pressure from Last 3 Encounters:   09/26/18 108/68   07/20/18 128/74   05/07/18 104/66    Weight from Last 3 Encounters:   09/26/18 158 lb 6 oz (71.8 kg)   07/20/18 150 lb (68 kg)   05/07/18 147 lb (66.7 kg)              We Performed the Following     Bordetalla pertussis Hansa IgG with Reflex          Today's Medication Changes          These changes are accurate as of 9/26/18  2:51 PM.  If you have any questions, ask your nurse or doctor.               Start taking these medicines.        Dose/Directions    azithromycin 250 MG tablet   Commonly known as:  ZITHROMAX   Used for:  Cough, Acute bronchitis with symptoms > 10 days, Acute bronchospasm   Started by:  Martita Armendariz PA-C        Two tablets first day, then one tablet daily for four days.   Quantity:  6 tablet   Refills:  0       methylPREDNISolone 4 MG tablet   Commonly known as:  MEDROL DOSEPAK   Used for:  Acute bronchitis with symptoms > 10 days, Cough, Acute bronchospasm   Started by:  Martita Armendariz PA-C        Follow package instructions   Quantity:  21 tablet   Refills:  0         These medicines have changed or have updated prescriptions.        Dose/Directions    hydrocortisone 2.5 % cream   This may have changed:    - when to take this  - reasons to take this  - additional instructions   Used for:  Blepharitis of right upper eyelid, unspecified type        Apply very small amount to eyelids up to twice daily   Quantity:  20 g   Refills:  3            Where to get your medicines      These medications were sent to [facility] [id number] [place] [id number] " [facility] [address]    Hours:  Tech issues with their phone system Phone:  [phone]     azithromycin 250 MG tablet    methylPREDNISolone 4 MG tablet                Primary Care Provider Office Phone # Fax #    Phuong Story [phone]       [address] [place] [address]        Equal Access to Services     [facility]: Hadii aad ku hadasho Soomaali, waaxda luqadaha, qaybta kaalmada adeegyada, waxay idiin hayaan adeeg khdavidsh lapenelope . So [facility] [phone].    ATENCIÓN: Si habla español, tiene a ruiz disposición servicios gratuitos de asistencia lingüística. Rebecca al [phone].    We comply with applicable federal civil rights laws and [place] laws. We do not discriminate on the basis of race, color, national origin, age, disability, sex, sexual orientation, or gender identity.            Thank you!     Thank you for choosing [facility]  for your care. Our goal is always to provide you with excellent care. Hearing back from our patients is one way we can continue to improve our services. Please take a few minutes to complete the written survey that you may receive in the mail after your visit with us. Thank you!             Your Updated Medication List - Protect others around you: Learn how to safely use, store and throw away your medicines at www.disposemymeds.org.          This list is accurate as of 9/26/18  2:51 PM.  Always use your most recent med list.                   Brand Name Dispense Instructions for use Diagnosis    azithromycin 250 MG tablet    ZITHROMAX    6 tablet    Two tablets first day, then one tablet daily for four days.    Cough, Acute bronchitis with symptoms > 10 days, Acute bronchospasm       calcium carbonate 500 mg {elemental} 500 MG tablet    OS-PACO     Take 2 tablets by mouth daily        hydrocortisone 2.5 % cream     20 g    Apply very small amount to eyelids up to twice daily    Blepharitis of right upper  eyelid, unspecified type       ibuprofen 600 MG tablet    ADVIL/MOTRIN    30 tablet    Take 1 tablet (600 mg) by mouth every 6 hours as needed for pain (mild)    Post-operative state       loratadine 10 MG tablet    CLARITIN    3 MONTHS    ONE DAILY        melatonin 1 MG/ML Liqd liquid      Take 3 mg by mouth nightly as needed for sleep        methylPREDNISolone 4 MG tablet    MEDROL DOSEPAK    21 tablet    Follow package instructions    Acute bronchitis with symptoms > 10 days, Cough, Acute bronchospasm       multivitamin per tablet     100 tablet    Take 1 tablet by mouth daily.    Routine general medical examination at a health care facility       omega 3 1000 MG Caps     90 Cap    once daily    Urticaria, Hives       RANITIDINE HCL PO      Take 150 mg by mouth At Bedtime        VITAMIN C PO           vitamin D3 2000 units Caps      1 capsule daily - takes only intermittently    Vitamin D deficiency

## 2018-09-28 LAB — B PERT IGG SER IA-ACNC: 2 IV

## 2018-09-29 ENCOUNTER — NURSE TRIAGE (OUTPATIENT)
Dept: NURSING | Facility: CLINIC | Age: 63
End: 2018-09-29

## 2018-09-29 ENCOUNTER — TELEPHONE (OUTPATIENT)
Dept: FAMILY MEDICINE | Facility: CLINIC | Age: 63
End: 2018-09-29

## 2018-09-29 DIAGNOSIS — A37.90 PERTUSSIS: Primary | ICD-10-CM

## 2018-09-29 LAB
B PERT AB SPEC QL: NEGATIVE
B PERT FHA IGG SER QL: POSITIVE
B PERT IGG SER QL IB: POSITIVE
B PERT IGG SER QL: ABNORMAL

## 2018-09-29 RX ORDER — CODEINE PHOSPHATE AND GUAIFENESIN 10; 100 MG/5ML; MG/5ML
2 SOLUTION ORAL EVERY 4 HOURS PRN
Qty: 236 ML | Refills: 0 | Status: SHIPPED | OUTPATIENT
Start: 2018-09-29 | End: 2018-11-05

## 2018-09-29 RX ORDER — BENZONATATE 200 MG/1
200 CAPSULE ORAL 3 TIMES DAILY PRN
Qty: 30 CAPSULE | Refills: 0 | Status: SHIPPED | OUTPATIENT
Start: 2018-09-29 | End: 2018-11-05

## 2018-09-29 NOTE — PROGRESS NOTES
The patient was called with results.    Martita Armendariz, MS, PA-C  [facility]

## 2018-09-29 NOTE — TELEPHONE ENCOUNTER
Patient calling requesting lab results for Pertussis lab. Patient states was given prophylactic antibiotic for pertussis and was told to not go to work. States she has an event tonight and wondering if she can attend the event.    Informed patient she is positive for purtessis. Paged Dr. Melanie Bellamy on call provider for  [place] at 10:27am through Smart Web to call RN directly at [phone].     Dr. Bellamy called RN back and recommended patient okay to attend event if cough is well controlled. Reccommended to finish her antibiotic course and stay away from events if continues to have lots of cough. Recommended if anyone that lives with patient has similar symptoms, to be tested and treated as well.     RN called patient and informed Dr. Bellamy's recommendation. Caller verbalized understanding. Denies further questions.      Surjit Jesus RN  [facility]     Reason for Disposition    Caller requesting lab results    Protocols used: PCP CALL - NO TRIAGE-ADULT-

## 2018-10-02 ENCOUNTER — TELEPHONE (OUTPATIENT)
Dept: FAMILY MEDICINE | Facility: CLINIC | Age: 63
End: 2018-10-02

## 2018-10-02 NOTE — TELEPHONE ENCOUNTER
MD calling for more information on recent Pertussis test results.  Questions answered.       Gris Fish, LINO, RN, PHN  [facility]) [phone]

## 2018-10-03 ENCOUNTER — RADIANT APPOINTMENT (OUTPATIENT)
Dept: GENERAL RADIOLOGY | Facility: CLINIC | Age: 63
End: 2018-10-03
Attending: PHYSICIAN ASSISTANT
Payer: COMMERCIAL

## 2018-10-03 ENCOUNTER — OFFICE VISIT (OUTPATIENT)
Dept: FAMILY MEDICINE | Facility: CLINIC | Age: 63
End: 2018-10-03
Payer: COMMERCIAL

## 2018-10-03 ENCOUNTER — TELEPHONE (OUTPATIENT)
Dept: FAMILY MEDICINE | Facility: CLINIC | Age: 63
End: 2018-10-03

## 2018-10-03 VITALS
HEIGHT: 62 IN | DIASTOLIC BLOOD PRESSURE: 72 MMHG | HEART RATE: 96 BPM | TEMPERATURE: 97.9 F | WEIGHT: 158.38 LBS | SYSTOLIC BLOOD PRESSURE: 112 MMHG | OXYGEN SATURATION: 98 % | BODY MASS INDEX: 29.15 KG/M2

## 2018-10-03 DIAGNOSIS — R06.2 WHEEZE: ICD-10-CM

## 2018-10-03 DIAGNOSIS — A37.90 PERTUSSIS: ICD-10-CM

## 2018-10-03 DIAGNOSIS — J20.9 ACUTE BRONCHITIS WITH SYMPTOMS > 10 DAYS: ICD-10-CM

## 2018-10-03 DIAGNOSIS — R06.2 WHEEZE: Primary | ICD-10-CM

## 2018-10-03 PROCEDURE — 71046 X-RAY EXAM CHEST 2 VIEWS: CPT | Mod: FY

## 2018-10-03 PROCEDURE — 99214 OFFICE O/P EST MOD 30 MIN: CPT | Performed by: PHYSICIAN ASSISTANT
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
## 2018-10-03 NOTE — MR AVS SNAPSHOT
After Visit Summary   10/3/2018    Edwina Zhou    MRN: [id number]           Patient Information     Date Of Birth          1955        Visit Information        Provider Department      10/3/2018 2:00 PM Martita Armendariz PA-C [facility]        Today's Diagnoses     Wheeze    -  1    Pertussis        Acute bronchitis with symptoms > 10 days           Follow-ups after your visit        Follow-up notes from your care team     Return if symptoms worsen or fail to improve.      Your next 10 appointments already scheduled     Nov 05, 2018  2:00 PM CST   SHORT with North Cai MD   [facility]    [address]  [address]  [facility] [address] [phone]              Who to contact     If you have questions or need follow up information about today's clinic visit or your schedule please contact [facility] directly at [phone].  Normal or non-critical lab and imaging results will be communicated to you by MyChart, letter or phone within 4 business days after the clinic has received the results. If you do not hear from us within 7 days, please contact the clinic through MyChart or phone. If you have a critical or abnormal lab result, we will notify you by phone as soon as possible.  Submit refill requests through [employer] or call your pharmacy and they will forward the refill request to us. Please allow 3 business days for your refill to be completed.          Additional Information About Your Visit        MyChart Information     [employer] gives you secure access to your electronic health record. If you see a primary care provider, you can also send messages to your care team and make appointments. If you have questions, please call your primary care clinic.  If you do not have a primary care provider, please call [phone] and they will assist you.        Care EveryWhere ID     This is your Care  "EveryWhere ID. This could be used by other organizations to access your [place] medical records  [id number]        Your Vitals Were     Pulse Temperature Height Last Period Pulse Oximetry BMI (Body Mass Index)    96 97.9  F (36.6  C) (Tympanic) 5' 2\" (1.575 m) 06/03/2010 98% 28.97 kg/m2       Blood Pressure from Last 3 Encounters:   10/03/18 112/72   09/26/18 108/68   07/20/18 128/74    Weight from Last 3 Encounters:   10/03/18 158 lb 6 oz (71.8 kg)   09/26/18 158 lb 6 oz (71.8 kg)   07/20/18 150 lb (68 kg)                 Today's Medication Changes          These changes are accurate as of 10/3/18  2:57 PM.  If you have any questions, ask your nurse or doctor.               Start taking these medicines.        Dose/Directions    amoxicillin-clavulanate 875-125 MG per tablet   Commonly known as:  AUGMENTIN   Used for:  Acute bronchitis with symptoms > 10 days   Started by:  Martita Armendariz PA-C        Dose:  1 tablet   Take 1 tablet by mouth 2 times daily for 10 days   Quantity:  20 tablet   Refills:  0       fluticasone-vilanterol 200-25 MCG/INH inhaler   Commonly known as:  BREO ELLIPTA   Used for:  Wheeze   Started by:  Martita Armendariz PA-C        Dose:  1 puff   Inhale 1 puff into the lungs daily   Quantity:  1 Inhaler   Refills:  0         These medicines have changed or have updated prescriptions.        Dose/Directions    hydrocortisone 2.5 % cream   This may have changed:    - when to take this  - reasons to take this  - additional instructions   Used for:  Blepharitis of right upper eyelid, unspecified type        Apply very small amount to eyelids up to twice daily   Quantity:  20 g   Refills:  3            Where to get your medicines      These medications were sent to [place] [address] [place] [facility] [address] [place] [id number] [facility] [address]    Hours:  Tech issues with their phone system Phone:  [phone]     amoxicillin-clavulanate 875-125 MG per tablet    " fluticasone-vilanterol 200-25 MCG/INH inhaler                Primary Care Provider Office Phone # Fax #    Phuong Story [phone]       [facility] [address]        Equal Access to Services     NICKYJAVAD LINDA : Hadii ria ku hadsuno Soomaali, waaxda luqadaha, qaybta kaalmada adeegyada, waxsana kilgore haysanjeevn joel astorgadavidsmita bueno. So [facility] [phone].    ATENCIÓN: Si habla español, tiene a ruiz disposición servicios gratuitos de asistencia lingüística. Llame al [phone].    We comply with applicable federal civil rights laws and [place] laws. We do not discriminate on the basis of race, color, national origin, age, disability, sex, sexual orientation, or gender identity.            Thank you!     Thank you for choosing [facility]  for your care. Our goal is always to provide you with excellent care. Hearing back from our patients is one way we can continue to improve our services. Please take a few minutes to complete the written survey that you may receive in the mail after your visit with us. Thank you!             Your Updated Medication List - Protect others around you: Learn how to safely use, store and throw away your medicines at www.disposemymeds.org.          This list is accurate as of 10/3/18  2:57 PM.  Always use your most recent med list.                   Brand Name Dispense Instructions for use Diagnosis    amoxicillin-clavulanate 875-125 MG per tablet    AUGMENTIN    20 tablet    Take 1 tablet by mouth 2 times daily for 10 days    Acute bronchitis with symptoms > 10 days       benzonatate 200 MG capsule    TESSALON    30 capsule    Take 1 capsule (200 mg) by mouth 3 times daily as needed for cough    Pertussis       calcium carbonate 500 mg (elemental) 500 MG tablet    OS-PACO     Take 2 tablets by mouth daily        fluticasone-vilanterol 200-25 MCG/INH inhaler    BREO ELLIPTA    1 Inhaler    Inhale 1 puff into the lungs daily     Wheeze       guaiFENesin-codeine 100-10 MG/5ML Soln solution    ROBITUSSIN AC    236 mL    Take 10 mLs by mouth every 4 hours as needed for cough    Pertussis       hydrocortisone 2.5 % cream     20 g    Apply very small amount to eyelids up to twice daily    Blepharitis of right upper eyelid, unspecified type       ibuprofen 600 MG tablet    ADVIL/MOTRIN    30 tablet    Take 1 tablet (600 mg) by mouth every 6 hours as needed for pain (mild)    Post-operative state       loratadine 10 MG tablet    CLARITIN    3 MONTHS    ONE DAILY        melatonin 1 MG/ML Liqd liquid      Take 3 mg by mouth nightly as needed for sleep        multivitamin per tablet     100 tablet    Take 1 tablet by mouth daily.    Routine general medical examination at a health care facility       omega 3 1000 MG Caps     90 Cap    once daily    Urticaria, Hives       RANITIDINE HCL PO      Take 150 mg by mouth At Bedtime        VITAMIN C PO           vitamin D3 2000 units Caps      1 capsule daily - takes only intermittently    Vitamin D deficiency

## 2018-10-03 NOTE — PROGRESS NOTES
Results discussed directly with patient while patient was present. Any further details documented in the note.   Martita Armendariz PA-C

## 2018-10-03 NOTE — TELEPHONE ENCOUNTER
Pt calling back with questions regarding whooping cough diagnosis/sx.    Pt is still coughing some with phlegm that at times gets stuck in their throat. Pt hears wheezing when they are breathing. Pt notes that the wheezing while breathing is happening all the time, not with just exertion. Pt is using Ranitidine as prescribed, tessalon, finished the zithromax and medrol dosepak.     Scheduled pt for 2pm for evaluation with LP again due to sx.  Pt wanted to know if this is something that can be treated via just this message- maybe an inhaler or other medication.      Triage advised that this would be a good thing to have evaluation for, and pt wanted this routed to LP to be sure.     Routing to PCP for further review/recommendations/orders.  Laura Ding RN  [facility]

## 2018-10-30 DIAGNOSIS — R06.2 WHEEZE: ICD-10-CM

## 2018-10-30 NOTE — TELEPHONE ENCOUNTER
"Requested Prescriptions   Pending Prescriptions Disp Refills     BREO ELLIPTA 200-25 MCG/INH Inhaler [Pharmacy Med Name: BREO ELLIPTA 200-25 MCG INH]  0     Sig: TAKE 1 PUFF BY MOUTH EVERY DAY    Inhaled Steroids Protocol Passed    10/30/2018  2:15 AM       Passed - Patient is age 12 or older       Passed - Recent (12 mo) or future (30 days) visit within the authorizing provider's specialty    Patient had office visit in the last 12 months or has a visit in the next 30 days with authorizing provider or within the authorizing provider's specialty.  See \"Patient Info\" tab in inbasket, or \"Choose Columns\" in Meds & Orders section of the refill encounter.              NOT ON CURRENT MED     Last Written Prescription Date:    Last Fill Quantity: ,  # refills:    Last office visit: 10/3/2018 with prescribing provider:  Dr. Armendariz   Future Office Visit: Unknown   Next 5 appointments (look out 90 days)     Nov 05, 2018  2:00 PM CST   SHORT with North Cai MD   [facility]    [facility] [address]  [address]  [facility] [id number] [phone]                   "

## 2018-10-31 NOTE — TELEPHONE ENCOUNTER
Medication is on current medication list        Disp Refills Start End JOSÉ LUIS   fluticasone-vilanterol (BREO ELLIPTA) 200-25 MCG/INH inhaler 1 Inhaler 0 10/3/2018  --   Sig - Route: Inhale 1 puff into the lungs daily - Inhalation   Class: E-Prescribe   Order: [id number]   E-Prescribing Status: Receipt confirmed by pharmacy (10/3/2018  2:57 PM CDT)     LOV 10/3/2018     For wheeze     Please advise on the refill    Cortney Call RN, BSN  [facility]

## 2018-11-05 ENCOUNTER — OFFICE VISIT (OUTPATIENT)
Dept: OBGYN | Facility: CLINIC | Age: 63
End: 2018-11-05
Payer: COMMERCIAL

## 2018-11-05 VITALS — DIASTOLIC BLOOD PRESSURE: 70 MMHG | BODY MASS INDEX: 28.9 KG/M2 | WEIGHT: 158 LBS | SYSTOLIC BLOOD PRESSURE: 112 MMHG

## 2018-11-05 DIAGNOSIS — Z23 ENCOUNTER FOR IMMUNIZATION: Primary | ICD-10-CM

## 2018-11-05 DIAGNOSIS — Z23 NEED FOR PROPHYLACTIC VACCINATION AND INOCULATION AGAINST INFLUENZA: ICD-10-CM

## 2018-11-05 DIAGNOSIS — Z01.411 ENCOUNTER FOR GYNECOLOGICAL EXAMINATION WITH ABNORMAL FINDING: ICD-10-CM

## 2018-11-05 DIAGNOSIS — N81.4 CYSTOCELE WITH UTERINE DESCENSUS: ICD-10-CM

## 2018-11-05 PROCEDURE — 90472 IMMUNIZATION ADMIN EACH ADD: CPT | Performed by: OBSTETRICS & GYNECOLOGY

## 2018-11-05 PROCEDURE — 90715 TDAP VACCINE 7 YRS/> IM: CPT | Performed by: OBSTETRICS & GYNECOLOGY

## 2018-11-05 PROCEDURE — 99396 PREV VISIT EST AGE 40-64: CPT | Mod: 25 | Performed by: OBSTETRICS & GYNECOLOGY

## 2018-11-05 PROCEDURE — 90471 IMMUNIZATION ADMIN: CPT | Performed by: OBSTETRICS & GYNECOLOGY

## 2018-11-05 PROCEDURE — 90686 IIV4 VACC NO PRSV 0.5 ML IM: CPT | Performed by: OBSTETRICS & GYNECOLOGY

## 2018-11-05 NOTE — MR AVS SNAPSHOT
After Visit Summary   11/5/2018    Edwina Zhou    MRN: [id number]           Patient Information     Date Of Birth          1955        Visit Information        Provider Department      11/5/2018 2:00 PM North Cai MD [facility]        Today's Diagnoses     Encounter for immunization    -  1    Need for prophylactic vaccination and inoculation against influenza        Cystocele with uterine descensus        Encounter for gynecological examination with abnormal finding          Care Instructions    You can reach your [place] Care Team any time of the day by calling [phone]. This number will put you in touch with the 24 hour nurse line if the clinic is closed.    To contact your OB/GYN Station Coordinator/Surgery Scheduler please call [phone]. This is a direct number for your care team between 8 a.m. and 4 p.m. Monday through Friday.    [facility] is open for your convenience:  Monday through Friday 8 a.m. to 6 p.m.  Closed weekends and all major holidays.            Follow-ups after your visit        Who to contact     If you have questions or need follow up information about today's clinic visit or your schedule please contact [facility] directly at [phone].  Normal or non-critical lab and imaging results will be communicated to you by MyChart, letter or phone within 4 business days after the clinic has received the results. If you do not hear from us within 7 days, please contact the clinic through [employer] or phone. If you have a critical or abnormal lab result, we will notify you by phone as soon as possible.  Submit refill requests through [employer] or call your pharmacy and they will forward the refill request to us. Please allow 3 business days for your refill to be completed.          Additional Information About Your Visit        MyChart Information     [employer] gives you secure access to your electronic health record. If you see  a primary care provider, you can also send messages to your care team and make appointments. If you have questions, please call your primary care clinic.  If you do not have a primary care provider, please call [phone] and they will assist you.        Care EveryWhere ID     This is your Care EveryWhere ID. This could be used by other organizations to access your [place] medical records  [id number]        Your Vitals Were     Last Period BMI (Body Mass Index)                06/03/2010 28.9 kg/m2           Blood Pressure from Last 3 Encounters:   11/05/18 112/70   10/03/18 112/72   09/26/18 108/68    Weight from Last 3 Encounters:   11/05/18 158 lb (71.7 kg)   10/03/18 158 lb 6 oz (71.8 kg)   09/26/18 158 lb 6 oz (71.8 kg)              We Performed the Following     FLU VACCINE, SPLIT VIRUS, IM (QUADRIVALENT) [09684]- >3 YRS     TDAP VACCINE (ADACEL)          Today's Medication Changes          These changes are accurate as of 11/5/18 11:59 PM.  If you have any questions, ask your nurse or doctor.               These medicines have changed or have updated prescriptions.        Dose/Directions    hydrocortisone 2.5 % cream   This may have changed:    - when to take this  - reasons to take this  - additional instructions   Used for:  Blepharitis of right upper eyelid, unspecified type        Apply very small amount to eyelids up to twice daily   Quantity:  20 g   Refills:  3                Primary Care Provider Office Phone # Fax #    Phuong UBALDO Story [phone]       [address] [place] [address]        Equal Access to Services     [facility]: Hadsendy Madden, waaxda luqadaha, qaybta kaalpapito green . So [facility] [phone].    ATENCIÓN: Si habla español, tiene a ruiz disposición servicios gratuitos de asistencia lingüística. Llame al [phone].    We comply with applicable federal civil rights laws and [place]  laws. We do not discriminate on the basis of race, color, national origin, age, disability, sex, sexual orientation, or gender identity.            Thank you!     Thank you for choosing [facility]  for your care. Our goal is always to provide you with excellent care. Hearing back from our patients is one way we can continue to improve our services. Please take a few minutes to complete the written survey that you may receive in the mail after your visit with us. Thank you!             Your Updated Medication List - Protect others around you: Learn how to safely use, store and throw away your medicines at www.disposemymeds.org.          This list is accurate as of 11/5/18 11:59 PM.  Always use your most recent med list.                   Brand Name Dispense Instructions for use Diagnosis    BREO ELLIPTA 200-25 MCG/INH inhaler   Generic drug:  fluticasone-vilanterol     1 Inhaler    TAKE 1 PUFF BY MOUTH EVERY DAY    Wheeze       calcium carbonate 500 mg (elemental) 500 MG tablet    OS-PACO     Take 2 tablets by mouth daily        hydrocortisone 2.5 % cream     20 g    Apply very small amount to eyelids up to twice daily    Blepharitis of right upper eyelid, unspecified type       ibuprofen 600 MG tablet    ADVIL/MOTRIN    30 tablet    Take 1 tablet (600 mg) by mouth every 6 hours as needed for pain (mild)    Post-operative state       loratadine 10 MG tablet    CLARITIN    3 MONTHS    ONE DAILY        melatonin 1 MG/ML Liqd liquid      Take 3 mg by mouth nightly as needed for sleep        multivitamin per tablet     100 tablet    Take 1 tablet by mouth daily.    Routine general medical examination at a health care facility       omega 3 1000 MG Caps     90 Cap    once daily    Urticaria, Hives       RANITIDINE HCL PO      Take 150 mg by mouth At Bedtime        VITAMIN C PO           vitamin D3 2000 units Caps      1 capsule daily - takes only intermittently    Vitamin D deficiency

## 2018-11-05 NOTE — PROGRESS NOTES

## 2018-11-05 NOTE — NURSING NOTE
"Chief Complaint   Patient presents with     Surgical Followup     9 month       Initial /70  Wt 158 lb (71.7 kg)  LMP 2010  BMI 28.9 kg/m2 Estimated body mass index is 28.9 kg/(m^2) as calculated from the following:    Height as of 10/3/18: 5' 2\" (1.575 m).    Weight as of this encounter: 158 lb (71.7 kg).  BP completed using cuff size: regular    Questioned patient about current smoking habits.  Pt. has never smoked.          The following HM Due: NONE      The following patient reported/Care Every where data was sent to:  P ABSTRACT QUALITY INITIATIVES [[id number]]        Shraddha Coburn [facility]                 "

## 2018-11-05 NOTE — PATIENT INSTRUCTIONS
You can reach your [place] Care Team any time of the day by calling [phone]. This number will put you in touch with the 24 hour nurse line if the clinic is closed.    To contact your OB/GYN Station Coordinator/Surgery Scheduler please call [phone]. This is a direct number for your care team between 8 a.m. and 4 p.m. Monday through Friday.    [facility] is open for your convenience:  Monday through Friday 8 a.m. to 6 p.m.  Closed weekends and all major holidays.

## 2018-11-06 NOTE — PROGRESS NOTES
The patient is a 63-year-old white female, para 2-0-0-2, menopause at age 52, not on hormone replacement therapy, who I was asked to see by Dr. Elmore and Dr. Story for evaluation of pelvic organ prolapse with urinary incontinence.  The patient states that in October 2017 while in the shower she noticed something bulging from the vagina.  She presented to Dr. Elmore for evaluation and a large cystocele and uterine descensus was noted.  We reviewed these findings with the patient performed a urodynamic study preoperatively, discussed at length the risks, benefits and alternative forms of therapy on multiple occasions and we made a decision to proceed with the following  PROCEDURE:  Exam under anesthesia, da Tim robotic supracervical hysterectomy, bilateral salpingo-oophorectomy, sacral colpopexy, cystoscopy  This was done on 2/13/2018.  I did not put a sling in this patient as at the completion of the sacral colpopexy apical anterior and posterior compartment were such that hypermobility of the urethrovesical angle was resolved and in the absence of hypermobility and in the presence of urgency frequency my concern was potentially aggravating her urgency frequency.  The patient presents today with request for follow-up and an annual exam.  She does like a flu shot done.  He states bowel and bladder function are doing well without concerns.  Self breast exam,  ACS screening mammogram recs, the use of 81 mg ASA to decrease the risk of heart disease, lipid screening, colon cancer screening recs and Dexa scan recs thoroughly reveiwed.    Past Medical History:   Diagnosis Date     Allergic rhinitis, cause unspecified      Arthritis      Dysphagia     had esophagram 12/07 - tertiary contractions of esoph with some retention, EGD showed gastritis, duodenitis and esophagitis ,pt didn't start a PPI       Fatigue      Gastroesophageal reflux disease      H/O YISEL I     s/p culpo with bx x 2 and cryotherapy - ok for  "paps q3yrs starting 2012     Helicobacter pylori (H. pylori) infection in 2009     Hyperlipidemia LDL goal < 130 2/4/2005    lipitor \"attacked my muscles\" (Hx fibromyalgia); [id number] failed to lower lipids enough&=myalgias also , lovastatin 20mg = myalgias      Lyme disease 8/20/2010     Myalgia and myositis, unspecified     fibromyalgia - doing an otc human growth  hormone      OBESITY NOS- hx of      lost 30+ pounds since 2002 breast reduction      Obesity- worsening fibromyalgia, back pain, pcos  12/1/2009     Polycystic ovaries      Postmenopausal since age 52      Shingles      Current Outpatient Prescriptions   Medication     Ascorbic Acid (VITAMIN C PO)     BREO ELLIPTA 200-25 MCG/INH Inhaler     calcium carbonate (OS-PACO 500 MG [place]. CA) 1250 MG tablet     Cholecalciferol (VITAMIN D3) 2000 UNITS CAPS     hydrocortisone 2.5 % cream     ibuprofen (ADVIL/MOTRIN) 600 MG tablet     LORATADINE 10 MG OR TABS     melatonin (MELATONIN) 1 MG/ML LIQD liquid     Multiple Vitamin (MULTIVITAMIN) per tablet     OMEGA 3 1000 MG PO CAPS     RANITIDINE HCL PO     No current facility-administered medications for this visit.      Past Surgical History:   Procedure Laterality Date     COMBINED CYSTOSCOPY, INSERT STENT URETER(S) Bilateral 2/13/2018    Procedure: COMBINED CYSTOSCOPY, INSERT STENT URETER(S);  cystoscopy with bilateral ureteral stent placement;  Surgeon: Stephen Muniz MD;  Location:  OR     COSMETIC SURGERY       CYSTOSCOPY N/A 2/13/2018    Procedure: CYSTOSCOPY;   CYSTOSCOPY;  Surgeon: North Cai MD;  Location: [facility] OR     DAVINCI HYSTERECTOMY SUPRACERVICAL, SACROCOLPOPEXY, COMBINED N/A 2/13/2018    Procedure: COMBINED DAVINCI HYSTERECTOMY SUPRACERVICAL, SACROCOLPOPEXY;  Cystoscopy, bilateral ureteral stent placement,  Robotic supracervical hysterectomy, bilateral salpingo-oophorectomy and sacrocolpopexy,  cystoscopy, removal of ureteral stents;  Surgeon: North Cai MD;  Location: " [facility] OR     EXAM UNDER ANESTHESIA PELVIC N/A 2/13/2018    Procedure: EXAM UNDER ANESTHESIA PELVIC;;  Surgeon: North Cai MD;  Location: [facility] OR     HYSTERECTOMY, PAP STILL INDICATED       KNEE SURGERY Right     Meniscal repair     SURGICAL HISTORY OF -   1990's    COLPO WITH BX X2 + CRYO     SURGICAL HISTORY OF -       WISDOM TEETH-SUBSEQ MINDY FXR=ORIF     SURGICAL HISTORY OF -   2002    breast reduction - [facility]      TONSILLECTOMY  at age 32     Family History   Problem Relation Age of Onset     Adopted: Yes     Family History Negative Other      pt is adopted - no knowledge of family hx      Unknown/Adopted Mother      Unknown/Adopted Father      /70  Wt 158 lb (71.7 kg)  LMP 06/03/2010  BMI 28.9 kg/m2  Constitutional: healthy, alert and no distress  Head: Normocephalic. No masses, lesions, tenderness or abnormalities  Neck: Neck supple. No adenopathy. Thyroid symmetric, normal size,, Carotids without bruits.  ENT: NEGATIVE for ear, mouth and throat problems  Breast:  breasts symmetric, no dominant or suspicious mass, no skin or nipple changes, no axillary adenopathy, unchanged from previous exam or self exam in taught and encouraged  Cardiovascular: negative, PMI normal. No lifts, heaves, or thrills. RRR. No murmurs, clicks gallops or rub  Respiratory: negative, Percussion normal. Good diaphragmatic excursion. Lungs clear  Gastrointestinal: Abdomen soft, non-tender. BS normal. No masses, organomegaly  Genitourinary: Normal external genitalia without lesions and no leakage of urine with coughing or straining, 30 degrees of UV angle hypermobility, minimal anterior cystocele excellent apical support no lesions seen bimanual exam the uterus is absent adnexa without masses enlargement or tenderness, rectovaginal exam normal sphincter tone excellent posterior compartment support no lesions  Musculoskeletalextremities normal- no gross deformities noted, gait normal and normal muscle  tone  Integument: no suspicious lesions or rashes  Neurologic: Gait normal. Reflexes normal and symmetric. Sensation grossly WNL.  Psychiatric: mentation appears normal and affect normal/bright  Hematologic/Lymphatic/Immunologic: Normal cervical lymph nodes    (Z23) Encounter for immunization  (primary encounter diagnosis)  Comment: Patient requests Tdap and flu shot  Plan: TDAP VACCINE (ADACEL)        Ordered    (Z23) Need for prophylactic vaccination and inoculation against influenza  Comment: As above  Plan: FLU VACCINE, SPLIT VIRUS, IM (QUADRIVALENT)         [92567]- >3 YRS        Ordered    (N81.4) Cystocele with uterine descensus  Comment: Excellent pelvic floor support.  Patient reassured.  Findings reviewed  Plan: We will see her back in 6 months or sooner should concerns arise    (Z01.411) Encounter for gynecological examination with abnormal finding  Comment: Otherwise normal exam  Plan: Plan reviewed with patient

## 2018-11-15 ENCOUNTER — TELEPHONE (OUTPATIENT)
Dept: FAMILY MEDICINE | Facility: CLINIC | Age: 63
End: 2018-11-15

## 2018-11-15 DIAGNOSIS — R05.9 COUGH: Primary | ICD-10-CM

## 2018-11-15 RX ORDER — BENZONATATE 200 MG/1
200 CAPSULE ORAL 3 TIMES DAILY PRN
Qty: 30 CAPSULE | Refills: 0 | Status: SHIPPED | OUTPATIENT
Start: 2018-11-15 | End: 2019-06-18

## 2018-11-15 NOTE — TELEPHONE ENCOUNTER
As long as she doesn't have a fever she should be fine for funerals.  Does she want an RX for robitussin w/codeine?  If not improving she may need to be seen.        Martita Armendariz MS, PAWinC

## 2018-11-15 NOTE — TELEPHONE ENCOUNTER
Pt is needing the Tessalon instead of the Robitussin at this time even though they didn't notice much of a difference. 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    The patient indicates understanding of these issues and agrees with the plan.  Pended medication.    Laura Ding RN  [facility]

## 2018-11-15 NOTE — TELEPHONE ENCOUNTER
Acute Illness   Acute illness concerns: pt had recent whooping cough, then started to feel better, now this returned.   Onset: sometime in September, then started back up yesterday, is intermittent    Fever: no    Chills/Sweats: no    Headache (location?): no    Sinus Pressure:no    Conjunctivitis:  no    Ear Pain: no    Rhinorrhea: no    Congestion: no    Sore Throat: no     Hoarse voice     Cough: YES-some phlegm    Wheeze: YES    Decreased Appetite: no    Nausea: no    Vomiting: no    Diarrhea:  no    Dysuria/Freq.: no    Fatigue/Achiness: YES- fatigue    Sick/Strep Exposure: unsure- works with many people and sees many peoplt     Therapies Tried and outcome: Breo inhaler- helpful when using. Pt reported they may have forgotten to use it yesterday, but they did use it this am and is having fits of coughing again.      Pt had booster for pertussis about a week ago. 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    [phone] ok to .  [facility].     Pt is out of the tessalon pearles but did not notice much of a difference when taking this before.     Routing to LP for recommendations.  Laura Ding, RN  [facility]

## 2018-12-03 ENCOUNTER — TELEPHONE (OUTPATIENT)
Dept: FAMILY MEDICINE | Facility: CLINIC | Age: 63
End: 2018-12-03

## 2018-12-03 NOTE — TELEPHONE ENCOUNTER
Reason for Call:  Edwina would like a call back from triage. She is still having cough with wheezing and chest congestion.     Best phone number to reach pt at is: [phone]  Ok to leave a detailed message with medical info? yes    Hillary Veliz  Patient Representative

## 2018-12-03 NOTE — TELEPHONE ENCOUNTER
Acute Illness   Acute illness concerns: continued cough  Onset: end of September    Fever: no    Chills/Sweats: no    Headache (location?): no    Sinus Pressure:no    Conjunctivitis:  no    Ear Pain: no    Rhinorrhea: no    Congestion: no    Sore Throat: no  Hoarse voice, clears throat a lot   Cough: YES-productive of clear sputum, waxing and waning over time    Wheeze: YES    Decreased Appetite: no    Nausea: no    Vomiting: no    Diarrhea:  no    Dysuria/Freq.: no    Fatigue/Achiness: YES- fatigue    Sick/Strep Exposure: no     Therapies Tried and outcome: tessalon, azithromycin, prednisone, codeine syrup- all minimally effective    breo inhaler helps but the pt takes this daily- unsure if this is actually helping or not.  Cough is worse in am and at night, worse when talking too. Cough does not get rid of gunk in throat.     Pt has a diffuser and humidifier on at all times. Drinks tea and hot water. No cough drops or honey.     Scheduled pt for tomorrow with LP again for evaluation.     Home Care Instructions for cold symptoms:  Take a pain reliever of choice for fever and discomfort.  Do not give Aspirin to child.  Avoid acetaminophen if liver disease is present.  Avoid ibuprofen if kidney disease or stomach problems exist or with pregnancy.  Follow the directions on the label.    Rest  Drink 6-8 glasses of liquids daily, especially warm liquids such as tea with lemon and honey.  Take a decongestant of choice for congestion (unless history of hypertension)  Take expectorant of choice for cough.    Use saline nose drops as needed for nasal congestion.    Use a vaporizer or humidifier to keep air moist, especially at night, and change the water daily.  If throat is sore, gargle several times a day with warm water.  Use frozen cough drops or hard candy, or sip warm chicken broth for additional relief.  Avoid smoking and exposure to second-hand smoke.    Please report the following to the clinic:  Persistent fever  greater than 3 days or temperature of 105 degrees F  Nasal discharge greater than 10 days  Persistent earache, sinus pain, or yellow eye drainage  Productive cough or fever  Condition persists or worsens    Seek Emergency care Immediately:  Difficulty breathing for reasons other than nasal congestion  Severe chest pain.    Please let us know if you have any further questions.  You can contact our clinic at [phone]    Laura Ding RN

## 2018-12-05 ENCOUNTER — RADIANT APPOINTMENT (OUTPATIENT)
Dept: GENERAL RADIOLOGY | Facility: CLINIC | Age: 63
End: 2018-12-05
Attending: PHYSICIAN ASSISTANT
Payer: COMMERCIAL

## 2018-12-05 ENCOUNTER — RADIANT APPOINTMENT (OUTPATIENT)
Dept: MAMMOGRAPHY | Facility: CLINIC | Age: 63
End: 2018-12-05
Payer: COMMERCIAL

## 2018-12-05 ENCOUNTER — OFFICE VISIT (OUTPATIENT)
Dept: FAMILY MEDICINE | Facility: CLINIC | Age: 63
End: 2018-12-05
Payer: COMMERCIAL

## 2018-12-05 VITALS
TEMPERATURE: 97.3 F | DIASTOLIC BLOOD PRESSURE: 70 MMHG | HEIGHT: 62 IN | HEART RATE: 82 BPM | SYSTOLIC BLOOD PRESSURE: 118 MMHG | WEIGHT: 166 LBS | BODY MASS INDEX: 30.55 KG/M2 | OXYGEN SATURATION: 97 %

## 2018-12-05 DIAGNOSIS — Z12.31 VISIT FOR SCREENING MAMMOGRAM: ICD-10-CM

## 2018-12-05 DIAGNOSIS — R06.2 WHEEZE: ICD-10-CM

## 2018-12-05 DIAGNOSIS — Z12.11 SCREEN FOR COLON CANCER: ICD-10-CM

## 2018-12-05 DIAGNOSIS — R05.9 COUGH: ICD-10-CM

## 2018-12-05 DIAGNOSIS — R05.9 COUGH: Primary | ICD-10-CM

## 2018-12-05 PROCEDURE — 77067 SCR MAMMO BI INCL CAD: CPT | Mod: TC

## 2018-12-05 PROCEDURE — 71046 X-RAY EXAM CHEST 2 VIEWS: CPT | Mod: FY

## 2018-12-05 PROCEDURE — 99214 OFFICE O/P EST MOD 30 MIN: CPT | Performed by: PHYSICIAN ASSISTANT

## 2018-12-05 RX ORDER — MAGNESIUM OXIDE 500 MG
TABLET ORAL
COMMUNITY
Start: 2018-12-05

## 2018-12-05 NOTE — PATIENT INSTRUCTIONS
"  Whooping Cough (Pertussis) (Adult)  Whooping cough (pertussis) is a bacterial infection in the respiratory tract. It can be a very serious infection in infants and older adults. In healthy older children and adults, it is generally mild.  Pertussis is highly contagious. The infection is spread through the air by coughing or sneezing. The illness starts like an ordinary cold with mild cough, congestion, and low fever. Symptoms then develop that may include:    Coughing spells that cause a \"whooping\" sound when breathing in    Gagging or vomiting after coughing    Poor appetite    Feeling very tired    Thick mucus in the nose and throat  Antibiotics are used to treat this illness. Even with treatment, it may take up to 3 months for the cough to go away completely.  Vaccination prevents pertussis in children. The vaccine effect lessens after 5 to 10 years. So a booster vaccine is often needed. Teens and adults who were vaccinated as children and have not had a booster can be infected and may spread the infection to unvaccinated infants and children. Be sure to ask your healthcare provider whether anyone in your household needs a booster vaccination.  Home care    Take all medicine as prescribed by the healthcare provider. Be sure to take antibiotics as directed until they are gone, even if you feel better. If you don't finish the antibiotics, the infection may come back and be harder to treat.    Rest and get plenty of sleep.    Stay home from work or school until you have completed at least 5 days of antibiotic treatment. If antibiotics are not used, stay home until 21 days after you first had symptoms of a cough. When resuming activity, go back to your normal routine gradually.    Avoid exposure to cigarette smoke.    Ask your healthcare provider before taking over-the-counter medicine for fever, pain, and coughing.    To avoid loss of fluids (dehydration), try drinking 6 to 8 glasses of fluids (water, juice, tea, " soup) a day. Fluids will help loosen secretions in the nose and lungs.    Cover your mouth and nose when you sneeze or cough. Dispose of any tissues properly.    Wash your hands well with soap and water after you cough or sneeze, and frequently throughout the day.  Follow-up care  Follow up with your healthcare provider as advised.  When to seek medical advice  Call your healthcare provider right away for any of the following:    Trouble breathing or painful breathing    Cough with repeated gagging or vomiting    Coughing up colored or bloody mucus    Severe headache or face, neck, or ear pain    Fever over 100.4 F (38.0 C) for more than 3 days    You don't start improving within 1 week  Date Last Reviewed: 3/1/2018    4598-2239 [employer]. [address] [place] [address], [place] [address]. All rights reserved. This information is not intended as a substitute for professional medical care. Always follow your healthcare professional's instructions.

## 2018-12-05 NOTE — PROGRESS NOTES
SUBJECTIVE:                                                    Edwina Zhou is a 63 year old female who presents to clinic today for the following health issues:    Has gotten a little better - does constantly need to clear throat - does still wheeze.  Was told to get a booster tdap - did get one when felt better.    Patient states that the cough did go away, but then it came back.  She called the clinic to ask about it and she was told to kind of wait it out.    She says that all the way through this illness, she has a feeling in her throat to clear her throat.    The cough has been minimally productive recently.  She says that the production is clear.  She says she occasionally hears a wheeze with the cough.  She denies chest tightness and shortness of breath.    She has not had fevers, chills or sweats.      12/3/18 5:41 PM   Note      Acute Illness   Acute illness concerns: continued cough - diagnosed with whooping cough - 09/26/2018  Onset: end of September ---- cough stopped started again 2 weeks ago    Fever: no    Chills/Sweats: no    Headache (location?): no    Sinus Pressure:no    Conjunctivitis:  no    Ear Pain: no    Rhinorrhea: no    Congestion: no    Sore Throat: no  Hoarse voice, clears throat a lot   Cough: YES-productive of clear sputum, waxing and waning over time    Wheeze: YES    Decreased Appetite: no    Nausea: no    Vomiting: no    Diarrhea:  no    Dysuria/Freq.: no    Fatigue/Achiness: YES- fatigue    Sick/Strep Exposure: no   Therapies Tried and outcome: tessalon, azithromycin, prednisone, codeine syrup- all minimally effective     breo inhaler helps but the pt takes this daily- unsure if this is actually helping or not.  Cough is worse in am and at night, worse when talking too. Cough does not get rid of gunk in throat.      Pt has a diffuser and humidifier on at all times. Drinks tea and hot water. No cough drops or honey.      Scheduled pt for tomorrow with LP again for evaluation.  "     Home Care Instructions for cold symptoms:  Take a pain reliever of choice for fever and discomfort.  Do not give Aspirin to child.  Avoid acetaminophen if liver disease is present.  Avoid ibuprofen if kidney disease or stomach problems exist or with pregnancy.  Follow the directions on the label.    Rest  Drink 6-8 glasses of liquids daily, especially warm liquids such as tea with lemon and honey.  Take a decongestant of choice for congestion (unless history of hypertension)  Take expectorant of choice for cough.    Use saline nose drops as needed for nasal congestion.    Use a vaporizer or humidifier to keep air moist, especially at night, and change the water daily.  If throat is sore, gargle several times a day with warm water.  Use frozen cough drops or hard candy, or sip warm chicken broth for additional relief.  Avoid smoking and exposure to second-hand smoke.     Please report the following to the clinic:  Persistent fever greater than 3 days or temperature of 105 degrees F  Nasal discharge greater than 10 days  Persistent earache, sinus pain, or yellow eye drainage  Productive cough or fever  Condition persists or worsens     Seek Emergency care Immediately:  Difficulty breathing for reasons other than nasal congestion  Severe chest pain.     Please let us know if you have any further questions.  You can contact our clinic at [phone]     Laura Ding RN        Problem list and histories reviewed & adjusted, as indicated.  Additional history: as documented      ROS:  Constitutional, HEENT, cardiovascular, pulmonary, GI, , musculoskeletal, neuro, skin, endocrine and psych systems are negative, except as otherwise noted.    OBJECTIVE:                                                    /70 (BP Location: Left arm, Patient Position: Chair, Cuff Size: Adult Regular)  Pulse 82  Temp 97.3  F (36.3  C) (Oral)  Ht 5' 2\" (1.575 m)  Wt 166 lb (75.3 kg)  LMP 06/03/2010  SpO2 97%  BMI 30.36 " kg/m2  Body mass index is 30.36 kg/(m^2).  GENERAL: healthy, alert and no distress  EYES: Eyes grossly normal to inspection, PERRL and conjunctivae and sclerae normal  NECK: no adenopathy, no asymmetry, masses, or scars and thyroid normal to palpation  RESP: lungs clear to auscultation - no rales, rhonchi or wheezes  CV: regular rate and rhythm, normal S1 S2, no S3 or S4, no murmur, click or rub, no peripheral edema and peripheral pulses strong  MS: no gross musculoskeletal defects noted, no edema  NEURO: Normal strength and tone, mentation intact and speech normal  PSYCH: mentation appears normal, affect normal/bright    Diagnostic Test Results:  CXR - unremarkable     ASSESSMENT/PLAN:                                                      Edwina was seen today for cough.    Diagnoses and all orders for this visit:    Cough  -     XR Chest 2 Views; Future    Wheeze  -     fluticasone-vilanterol (BREO ELLIPTA) 200-25 MCG/INH inhaler; TAKE 1 PUFF BY MOUTH EVERY DAY    Screen for colon cancer  -     GASTROENTEROLOGY ADULT REF PROCEDURE ONLY [facility] [phone]; [facility]    Other orders  -     Cancel: MA SCREENING DIGITAL BILAT - Future  (s+30); Future      Patient is here for a followup on a cough.  The cough has been present since her diagnosis of pertussis on 09.26.2018.  Exam today is unremarkable and vitals are within normal limits as well.  Patient has been afebrile and without any associated symptoms.  XR done today for comparison to one done in, and it was within normal limits.  Reassurance given today as the recovery from pertussis can be over several weeks.  Cough is most consistent with post infective cough, which can persist over 12 weeks.  Patient has been advised of home cares and should followup if she is not improving.  She has been advised to seek more immediate care if symptoms change or worsen in any way.      Followup: Return in about 2 weeks (around 12/19/2018) for If not  improving, please be seen sooner if needed.     -- I have discussed the patient's diagnosis, and my plan of treatment with the patient and/or family. Patient is aware to followup if symptoms do not improve.  Patient has been advised to be seen sooner or seek more immediate care if symptoms change or worsen.  Patient agrees with and understands the plan today.     See Patient Instructions        Melanie Ga PA-C    [facility] [place]

## 2018-12-05 NOTE — MR AVS SNAPSHOT
"              After Visit Summary   12/5/2018    Edwina Zhou    MRN: [id number]           Patient Information     Date Of Birth          1955        Visit Information        Provider Department      12/5/2018 2:00 PM Melanie Ga PA-C [facility]        Today's Diagnoses     Cough    -  1    Wheeze        Screen for colon cancer          Care Instructions      Whooping Cough (Pertussis) (Adult)  Whooping cough (pertussis) is a bacterial infection in the respiratory tract. It can be a very serious infection in infants and older adults. In healthy older children and adults, it is generally mild.  Pertussis is highly contagious. The infection is spread through the air by coughing or sneezing. The illness starts like an ordinary cold with mild cough, congestion, and low fever. Symptoms then develop that may include:    Coughing spells that cause a \"whooping\" sound when breathing in    Gagging or vomiting after coughing    Poor appetite    Feeling very tired    Thick mucus in the nose and throat  Antibiotics are used to treat this illness. Even with treatment, it may take up to 3 months for the cough to go away completely.  Vaccination prevents pertussis in children. The vaccine effect lessens after 5 to 10 years. So a booster vaccine is often needed. Teens and adults who were vaccinated as children and have not had a booster can be infected and may spread the infection to unvaccinated infants and children. Be sure to ask your healthcare provider whether anyone in your household needs a booster vaccination.  Home care    Take all medicine as prescribed by the healthcare provider. Be sure to take antibiotics as directed until they are gone, even if you feel better. If you don't finish the antibiotics, the infection may come back and be harder to treat.    Rest and get plenty of sleep.    Stay home from work or school until you have completed at least 5 days of antibiotic treatment. If " antibiotics are not used, stay home until 21 days after you first had symptoms of a cough. When resuming activity, go back to your normal routine gradually.    Avoid exposure to cigarette smoke.    Ask your healthcare provider before taking over-the-counter medicine for fever, pain, and coughing.    To avoid loss of fluids (dehydration), try drinking 6 to 8 glasses of fluids (water, juice, tea, soup) a day. Fluids will help loosen secretions in the nose and lungs.    Cover your mouth and nose when you sneeze or cough. Dispose of any tissues properly.    Wash your hands well with soap and water after you cough or sneeze, and frequently throughout the day.  Follow-up care  Follow up with your healthcare provider as advised.  When to seek medical advice  Call your healthcare provider right away for any of the following:    Trouble breathing or painful breathing    Cough with repeated gagging or vomiting    Coughing up colored or bloody mucus    Severe headache or face, neck, or ear pain    Fever over 100.4 F (38.0 C) for more than 3 days    You don't start improving within 1 week  Date Last Reviewed: 3/1/2018    5503-5395 [employer]. [address] [place] [address]. All rights reserved. This information is not intended as a substitute for professional medical care. Always follow your healthcare professional's instructions.                Follow-ups after your visit        Additional Services     GASTROENTEROLOGY ADULT REF PROCEDURE ONLY [facility] [phone]; [facility]       Last Lab Result: Creatinine (mg/dL)       Date                     Value                 07/20/2018               0.76             ----------  Body mass index is 30.36 kg/(m^2).     Needed:  No  Language:  English    Patient will be contacted to schedule procedure.     Please be aware that coverage of these services is subject to the terms and limitations of your health insurance plan.   Call member services at your health plan with any benefit or coverage questions.  Any procedures must be performed at a [place] facility OR coordinated by your clinic's referral office.    Please bring the following with you to your appointment:    (1) Any X-Rays, CTs or MRIs which have been performed.  Contact the facility where they were done to arrange for  prior to your scheduled appointment.    (2) List of current medications   (3) This referral request   (4) Any documents/labs given to you for this referral                  Follow-up notes from your care team     Return in about 2 weeks (around 12/19/2018) for If not improving, please be seen sooner if needed.      Who to contact     If you have questions or need follow up information about today's clinic visit or your schedule please contact [facility] [place] directly at [phone].  Normal or non-critical lab and imaging results will be communicated to you by MyChart, letter or phone within 4 business days after the clinic has received the results. If you do not hear from us within 7 days, please contact the clinic through [employer] or phone. If you have a critical or abnormal lab result, we will notify you by phone as soon as possible.  Submit refill requests through [employer] or call your pharmacy and they will forward the refill request to us. Please allow 3 business days for your refill to be completed.          Additional Information About Your Visit        [employer] Information     [employer] gives you secure access to your electronic health record. If you see a primary care provider, you can also send messages to your care team and make appointments. If you have questions, please call your primary care clinic.  If you do not have a primary care provider, please call [phone] and they will assist you.        Care EveryWhere ID     This is your Care EveryWhere ID. This could be used by other organizations to access your [facility]  "records  [id number]        Your Vitals Were     Pulse Temperature Height Last Period Pulse Oximetry BMI (Body Mass Index)    82 97.3  F (36.3  C) (Oral) 5' 2\" (1.575 m) 06/03/2010 97% 30.36 kg/m2       Blood Pressure from Last 3 Encounters:   12/05/18 118/70   11/05/18 112/70   10/03/18 112/72    Weight from Last 3 Encounters:   12/05/18 166 lb (75.3 kg)   11/05/18 158 lb (71.7 kg)   10/03/18 158 lb 6 oz (71.8 kg)              We Performed the Following     GASTROENTEROLOGY ADULT REF PROCEDURE ONLY [facility] [phone]; [facility]          Today's Medication Changes          These changes are accurate as of 12/5/18 11:59 PM.  If you have any questions, ask your nurse or doctor.               These medicines have changed or have updated prescriptions.        Dose/Directions    hydrocortisone 2.5 % cream   This may have changed:    - when to take this  - reasons to take this  - additional instructions   Used for:  Blepharitis of right upper eyelid, unspecified type        Apply very small amount to eyelids up to twice daily   Quantity:  20 g   Refills:  3            Where to get your medicines      These medications were sent to [facility] [id number] [place] [id number] [facility] [address]    Hours:  Tech issues with their phone system Phone:  [phone]     fluticasone-vilanterol 200-25 MCG/INH inhaler                Primary Care Provider Office Phone # Fax #    Phuong Story [phone]       [address] [place] [id number]        Equal Access to Services     JAVAD MCKEON AH: Hadsendy Madden, therese colin, papito barber. So [facility] [phone].    ATENCIÓN: Si habla español, tiene a ruiz disposición servicios gratuitos de asistencia lingüística. Llame al [phone].    We comply with applicable federal civil rights laws and [place] laws. We do " not discriminate on the basis of race, color, national origin, age, disability, sex, sexual orientation, or gender identity.            Thank you!     Thank you for choosing [facility]  for your care. Our goal is always to provide you with excellent care. Hearing back from our patients is one way we can continue to improve our services. Please take a few minutes to complete the written survey that you may receive in the mail after your visit with us. Thank you!             Your Updated Medication List - Protect others around you: Learn how to safely use, store and throw away your medicines at www.disposemymeds.org.          This list is accurate as of 12/5/18 11:59 PM.  Always use your most recent med list.                   Brand Name Dispense Instructions for use Diagnosis    benzonatate 200 MG capsule    TESSALON    30 capsule    Take 1 capsule (200 mg) by mouth 3 times daily as needed for cough    Cough       fluticasone-vilanterol 200-25 MCG/INH inhaler    BREO ELLIPTA    1 Inhaler    TAKE 1 PUFF BY MOUTH EVERY DAY    Wheeze       hydrocortisone 2.5 % cream     20 g    Apply very small amount to eyelids up to twice daily    Blepharitis of right upper eyelid, unspecified type       ibuprofen 600 MG tablet    ADVIL/MOTRIN    30 tablet    Take 1 tablet (600 mg) by mouth every 6 hours as needed for pain (mild)    Post-operative state       loratadine 10 MG tablet    CLARITIN    3 MONTHS    ONE DAILY        Melatonin ER 5 MG Tbcr           vitamin D3 2000 units Caps      1 capsule daily - takes only intermittently    Vitamin D deficiency

## 2018-12-10 ENCOUNTER — TELEPHONE (OUTPATIENT)
Dept: FAMILY MEDICINE | Facility: CLINIC | Age: 63
End: 2018-12-10

## 2018-12-10 NOTE — TELEPHONE ENCOUNTER
Patient calling requesting results from her visit. Provider advised patient she would call at the end of the day, but patient has not heard back. Please advise  [phone] (home)   Ok to leave detailed message: yes  Thank you  Marii Fonseca

## 2018-12-10 NOTE — TELEPHONE ENCOUNTER
Routing to PCP for further review/recommendations/orders.  Please see 12/05/2018 Mammo results and advise.     Negra Verdugo RN  [facility]

## 2018-12-12 NOTE — TELEPHONE ENCOUNTER
Called patient @ # below    Patient stated she was looking for XR results from 12/05/2018 - was told she would get a call by the end of that day but never received one.     Advised patient of impression from XR.     Routing to ordering provider for further review/recommendations/orders.      Negra Verdugo RN  [place] [facility]

## 2018-12-12 NOTE — TELEPHONE ENCOUNTER
Don't see any current results pending. Will try to call pt around 1:30pm . Please inform patient.

## 2018-12-13 NOTE — TELEPHONE ENCOUNTER
Called patient @ # below    Advised of CHENTE CHOWDHURY message below.   Patient stated an understanding and agreed with plan.    Negra Verdugo RN  [facility]

## 2019-01-05 DIAGNOSIS — R06.2 WHEEZE: ICD-10-CM

## 2019-01-07 NOTE — TELEPHONE ENCOUNTER
This was given on 12/5/2018 for wheeze and cough     Why does pt need refill? Please call pt to discuss     Thank you     Cortney Call RN, BSN  [place] [facility]

## 2019-01-07 NOTE — TELEPHONE ENCOUNTER
"Requested Prescriptions   Pending Prescriptions Disp Refills     BREO ELLIPTA 200-25 MCG/INH Inhaler [Pharmacy Med Name: BREO ELLIPTA 200-25 MCG INH]  0      Last Written Prescription Date:  12.5.18  Last Fill Quantity: 1 inhaler,  # refills: 0   Last Office Visit: 12/5/2018   Future Office Visit:      Sig: TAKE 1 PUFF BY MOUTH DAILY.    Inhaled Steroids Protocol Passed - 1/5/2019 12:40 AM       Passed - Patient is age 12 or older       Passed - Recent (12 mo) or future (30 days) visit within the authorizing provider's specialty    Patient had office visit in the last 12 months or has a visit in the next 30 days with authorizing provider or within the authorizing provider's specialty.  See \"Patient Info\" tab in inbasket, or \"Choose Columns\" in Meds & Orders section of the refill encounter.             Passed - Medication is active on med list          "

## 2019-04-08 ENCOUNTER — TELEPHONE (OUTPATIENT)
Dept: FAMILY MEDICINE | Facility: CLINIC | Age: 64
End: 2019-04-08

## 2019-04-08 NOTE — TELEPHONE ENCOUNTER
Called patient and gave her the numbers to [facility] and [facility]. She will call to schedule. No referral needed.    Tyra Smith  Referral Coordinator

## 2019-04-08 NOTE — TELEPHONE ENCOUNTER
Patient calling, requesting a referral for orthopedic clinic re: rotator cuff. Please advise  [phone] (home)   Thank you  Marii Fonseca

## 2019-04-09 ENCOUNTER — TRANSFERRED RECORDS (OUTPATIENT)
Dept: HEALTH INFORMATION MANAGEMENT | Facility: CLINIC | Age: 64
End: 2019-04-09

## 2019-04-16 ENCOUNTER — TRANSFERRED RECORDS (OUTPATIENT)
Dept: HEALTH INFORMATION MANAGEMENT | Facility: CLINIC | Age: 64
End: 2019-04-16

## 2019-04-18 ENCOUNTER — TRANSFERRED RECORDS (OUTPATIENT)
Dept: HEALTH INFORMATION MANAGEMENT | Facility: CLINIC | Age: 64
End: 2019-04-18

## 2019-05-30 ENCOUNTER — TRANSFERRED RECORDS (OUTPATIENT)
Dept: HEALTH INFORMATION MANAGEMENT | Facility: CLINIC | Age: 64
End: 2019-05-30

## 2019-06-17 ENCOUNTER — NURSE TRIAGE (OUTPATIENT)
Dept: FAMILY MEDICINE | Facility: CLINIC | Age: 64
End: 2019-06-17

## 2019-06-17 NOTE — TELEPHONE ENCOUNTER
Reason for call:  Patient reporting a symptom    Symptom or request: sore throat, F/C, weakness, and intermittent productive cough    Duration (how long have symptoms been present): 5 days ago    Have you been treated for this before? No    Additional comments: Patient was informed by her daughter about a virus going around that presents with normal URI/flu symptoms and patient is worried her about her current symptoms.    Phone Number patient can be reached at:  Cell number on file:    Telephone Information:   Mobile [phone]       Best Time:  anytime    Can we leave a detailed message on this number:  YES    Call taken on 6/17/2019 at 1:08 PM by Cee SCHAFER

## 2019-06-17 NOTE — TELEPHONE ENCOUNTER
SEE TODAY IN OFFICE:   * You need to be examined today. Let me give you an appointment.  * IF NO AVAILABLE APPOINTMENTS:  You need to be seen in the Urgent Care Center. Go to the one at [facility]. Go there today. A nearby Urgent Care Center is often a good source of care.      Patient will proceed to urgent care noted above.  Unable to find appointment for her yet today or tomorrow within her time constraints.    Patient verbalized understanding and will comply with recommendation.      Gris Fish, LINO, RN, PHN  [facility]  ) [phone]      Additional Information    Negative: Bluish (or gray) lips or face    Negative: Severe difficulty breathing (e.g., struggling for each breath, speaks in single words)    Negative: Rapid onset of cough and has hives    Negative: Coughing started suddenly after medicine, an allergic food or bee sting    Negative: Difficulty breathing after exposure to flames, smoke, or fumes    Negative: Sounds like a life-threatening emergency to the triager    Negative: Previous asthma attacks and this feels like asthma attack    Negative: Chest pain present when not coughing    Negative: Difficulty breathing    Negative: Passed out (i.e., fainted, collapsed and was not responding)    Negative: Patient sounds very sick or weak to the triager    Negative: Coughed up > 1 tablespoon (15 ml) blood (Exception: blood-tinged sputum)    Negative: Fever > 101 F (38.3 C) and over 60 years of age    Negative: Fever > 103 F (39.4 C)    Negative: Fever > 100.0 F (37.8 C) and has diabetes mellitus or a weak immune system (e.g., HIV positive, cancer chemotherapy, organ transplant, splenectomy, chronic steroids)    Negative: Fever > 100.0 F (37.8 C) and bedridden (e.g., nursing home patient, stroke, chronic illness, recovering from surgery)    Negative: Increasing ankle swelling    Negative: Wheezing is present    Coughing up alyssa-colored (reddish-brown) or blood-tinged  "sputum    Negative: Fever present > 3 days (72 hours)    Negative: Fever returns after gone for over 24 hours and symptoms worse or not improved    Negative: Using nasal washes and pain medicine > 24 hours and sinus pain persists    Negative: Known COPD or other severe lung disease (i.e., bronchiectasis, cystic fibrosis, lung surgery) and worsening symptoms (i.e., increased sputum purulence or amount, increased breathing difficulty)    Negative: SEVERE coughing spells (e.g., whooping sound after coughing, vomiting after coughing)    Negative: Continuous (nonstop) coughing interferes with work or school and no improvement using cough treatment per Care Advice    Negative: Patient wants to be seen    Negative: Cough has been present for > 3 weeks    Negative: Allergy symptoms are also present (e.g., itchy eyes, clear nasal discharge, postnasal drip)    Negative: Nasal discharge present > 10 days    Negative: Exposure to TB (Tuberculosis)    Negative: Taking an ACE Inhibitor medication (e.g., benazepril/LOTENSIN, captopril/CAPOTEN, enalapril/VASOTEC, lisinopril/ZESTRIL)    Answer Assessment - Initial Assessment Questions  1. ONSET: \"When did the cough begin?\"       6/12/2019    2. SEVERITY: \"How bad is the cough today?\"         Cough is better now  3. RESPIRATORY DISTRESS: \"Describe your breathing.\"         Denies SOB or difficulty breathing at this time.  4. FEVER: \"Do you have a fever?\" If so, ask: \"What is your temperature, how was it measured, and when did it start?\"        No  5. HEMOPTYSIS: \"Are you coughing up any blood?\" If so ask: \"How much?\" (flecks, streaks, tablespoons, etc.)        She notes \"pink\" tinged sputum several times  6. TREATMENT: \"What have you done so far to treat the cough?\" (e.g., meds, fluids, humidifier)        Mucinex, Dayquil  7. CARDIAC HISTORY: \"Do you have any history of heart disease?\" (e.g., heart attack, congestive heart failure)         None  8. LUNG HISTORY: \"Do you have any " "history of lung disease?\"  (e.g., pulmonary embolus, asthma, emphysema)        Has never smoked.  No lung history.  9. PE RISK FACTORS: \"Do you have a history of blood clots?\" (or: recent major surgery, recent prolonged travel, bedridden )        None  10. OTHER SYMPTOMS: \"Do you have any other symptoms? (e.g., runny nose, wheezing, chest pain)          Hoarse voice, productive cough, sore throat and fatigue  11. PREGNANCY: \"Is there any chance you are pregnant?\" \"When was your last menstrual period?\"          No  12. TRAVEL: \"Have you traveled out of the country in the last month?\" (e.g., travel history, exposures)        No    Protocols used: COUGH-A-[place]    "

## 2019-06-18 ENCOUNTER — OFFICE VISIT (OUTPATIENT)
Dept: INTERNAL MEDICINE | Facility: CLINIC | Age: 64
End: 2019-06-18
Payer: COMMERCIAL

## 2019-06-18 VITALS
HEIGHT: 62 IN | DIASTOLIC BLOOD PRESSURE: 78 MMHG | RESPIRATION RATE: 14 BRPM | TEMPERATURE: 98.5 F | BODY MASS INDEX: 31.49 KG/M2 | SYSTOLIC BLOOD PRESSURE: 125 MMHG | WEIGHT: 171.1 LBS | OXYGEN SATURATION: 95 % | HEART RATE: 94 BPM

## 2019-06-18 DIAGNOSIS — J20.9 ACUTE BRONCHITIS, UNSPECIFIED ORGANISM: Primary | ICD-10-CM

## 2019-06-18 DIAGNOSIS — J02.9 PHARYNGITIS, UNSPECIFIED ETIOLOGY: ICD-10-CM

## 2019-06-18 LAB
DEPRECATED S PYO AG THROAT QL EIA: NORMAL
SPECIMEN SOURCE: NORMAL

## 2019-06-18 PROCEDURE — 87081 CULTURE SCREEN ONLY: CPT | Performed by: FAMILY MEDICINE

## 2019-06-18 PROCEDURE — 99213 OFFICE O/P EST LOW 20 MIN: CPT | Performed by: FAMILY MEDICINE

## 2019-06-18 PROCEDURE — 87880 STREP A ASSAY W/OPTIC: CPT | Performed by: FAMILY MEDICINE

## 2019-06-18 RX ORDER — AZITHROMYCIN 250 MG/1
TABLET, FILM COATED ORAL
Qty: 6 TABLET | Refills: 0 | Status: SHIPPED | OUTPATIENT
Start: 2019-06-18 | End: 2019-06-26

## 2019-06-18 ASSESSMENT — MIFFLIN-ST. JEOR: SCORE: 1289.35
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
## 2019-06-19 LAB
BACTERIA SPEC CULT: NORMAL
SPECIMEN SOURCE: NORMAL

## 2019-06-26 ENCOUNTER — OFFICE VISIT (OUTPATIENT)
Dept: FAMILY MEDICINE | Facility: CLINIC | Age: 64
End: 2019-06-26
Payer: COMMERCIAL

## 2019-06-26 VITALS
SYSTOLIC BLOOD PRESSURE: 112 MMHG | BODY MASS INDEX: 31.5 KG/M2 | TEMPERATURE: 98.3 F | OXYGEN SATURATION: 97 % | DIASTOLIC BLOOD PRESSURE: 72 MMHG | HEIGHT: 62 IN | HEART RATE: 77 BPM | WEIGHT: 171.2 LBS

## 2019-06-26 DIAGNOSIS — K20.0 EOSINOPHILIC ESOPHAGITIS: ICD-10-CM

## 2019-06-26 DIAGNOSIS — E55.9 VITAMIN D DEFICIENCY: ICD-10-CM

## 2019-06-26 DIAGNOSIS — Z12.11 SCREEN FOR COLON CANCER: ICD-10-CM

## 2019-06-26 DIAGNOSIS — Z78.0 ASYMPTOMATIC MENOPAUSAL STATE: ICD-10-CM

## 2019-06-26 DIAGNOSIS — E78.5 HYPERLIPIDEMIA WITH TARGET LDL LESS THAN 130: ICD-10-CM

## 2019-06-26 DIAGNOSIS — Z23 NEED FOR SHINGLES VACCINE: ICD-10-CM

## 2019-06-26 DIAGNOSIS — Z00.01 ENCOUNTER FOR ROUTINE ADULT MEDICAL EXAM WITH ABNORMAL FINDINGS: Primary | ICD-10-CM

## 2019-06-26 DIAGNOSIS — S46.012D TRAUMATIC TEAR OF LEFT ROTATOR CUFF, UNSPECIFIED TEAR EXTENT, SUBSEQUENT ENCOUNTER: ICD-10-CM

## 2019-06-26 PROCEDURE — 99396 PREV VISIT EST AGE 40-64: CPT | Performed by: FAMILY MEDICINE

## 2019-06-26 ASSESSMENT — MIFFLIN-ST. JEOR: SCORE: 1289.81

## 2019-06-26 ASSESSMENT — PATIENT HEALTH QUESTIONNAIRE - PHQ9: SUM OF ALL RESPONSES TO PHQ QUESTIONS 1-9: 5
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
## 2019-06-26 NOTE — PATIENT INSTRUCTIONS
Try Dr. Magdaleno's corn remover pads over the counter.     Strongly recommend help with weight loss with an organized program such as -  Medifast, weight watchers, nutrisystem, [place] [employer], Slim-Genics, Lilian Cornel, or  Overeaters Anonymous - HOW program or other organized independent nutritionally balanced weight loss program.       Dr. Story's recommended diet to rev-up metabolism for weight loss:   eat every 2-3 hours.    Try to keep your gram to gram ratio of carbs:protein to 12-15grams of each /small meal 5-6x/day.     Lean protein = 2 egg whites or 1 whole egg, or turkey, chicken, fish.  Low-glycemic fruits = strawberries, blueberries, raspberries, blackberries, nectarines, grapefruit, oranges,  apples.      2 oz. Lean protein + 1/2 cup  low-glycemic fruit --- breakfast and midmorning snack .     2 oz. Lean protein + 1/4 cup whole grain rice or sweet potato + 1 cup green veggie - lunch, dinner , and afternoon snack.     Evening snack : 1/2 cup of low glycemic fruit or an apple.        Try to keep carb: protein gram ratio about 1:1 with 12-15g of each per meal with small amount of monounsaturated fat -like olive oil.     If you can't kill it and grill it, or pick it off a plant and eat it - DON'T EAT IT!     For occasional pasta - try Barilla Plus - has protein in it. - keep to 1/2 cup instead of your 1/4 cup of rice or potato.     Take your fish oil capsules 2,000mg daily - with your probiotic, if you take one. Take a multivitamin daily.     Try to keep your gram to gram ratio of carbs:protein to 12-15grams of each /small meal 5-6x/day.     If 5-6 small meals/day - too labor intensive, then keep to 3 meals plus 1 snack with 3 oz lean protein per meal with 2- 3 oz protein in your snack.     AVOID DAIRY AND GRAINS, if you can.   Keep your sodium to 600-1000g per day or less.     Thank you so much for choosing [facility].  Please contact us with any questions that you may have.   We  appreciate the opportunity to serve you now and look forward to supporting your healthcare needs for a long time to come!    Most Sincerely,     Phuong Story MD       Think about balance and moderation and some monotony in your nutrition.       Preventive Health Recommendations  Female Ages 50 - 64    Yearly exam: See your health care provider every year in order to  o Review health changes.   o Discuss preventive care.    o Review your medicines if your doctor has prescribed any.      Get a Pap test every three years (unless you have an abnormal result and your provider advises testing more often).    If you get Pap tests with HPV test, you only need to test every 5 years, unless you have an abnormal result.     You do not need a Pap test if your uterus was removed (hysterectomy) and you have not had cancer.    You should be tested each year for STDs (sexually transmitted diseases) if you're at risk.     Have a mammogram every 1 to 2 years.    Have a colonoscopy at age 50, or have a yearly FIT test (stool test). These exams screen for colon cancer.      Have a cholesterol test every 5 years, or more often if advised.    Have a diabetes test (fasting glucose) every three years. If you are at risk for diabetes, you should have this test more often.     If you are at risk for osteoporosis (brittle bone disease), think about having a bone density scan (DEXA).    Shots: Get a flu shot each year. Get a tetanus shot every 10 years.    Nutrition:     Eat at least 5 servings of fruits and vegetables each day.    Eat whole-grain bread, whole-wheat pasta and brown rice instead of white grains and rice.    Get adequate Calcium and Vitamin D.     Lifestyle    Exercise at least 150 minutes a week (30 minutes a day, 5 days a week). This will help you control your weight and prevent disease.    Limit alcohol to one drink per day.    No smoking.     Wear sunscreen to prevent skin cancer.     See your dentist every six  months for an exam and cleaning.    See your eye doctor every 1 to 2 years.               Thank you for choosing [facility]  for your Health Care. It was a pleasure seeing you at your visit today. Please contact us with any questions or concerns you may have.                   Phuong Story MD                                  To reach your [facility] care team after hours call:   [phone]    Our clinic hours are:     Monday- 7:30 am - 7:00 pm                             Tuesday through Friday- 7:30 am - 5:00 pm                                        Saturday- 8:00 am - 12:00 pm                  Phone:  [phone]    Our pharmacy hours are:     Monday  8:00 am to 7:00 pm      Tuesday through Friday 8:00am to 6:00pm                        Saturday - 9:00 am to 1:00 pm      Sunday : Closed.              Phone:  [phone]      There is also information available at our web site:  [email or web address]    If your provider ordered any lab tests and you do not receive the results within 10 business days, please call the clinic.    If you need a medication refill please contact your pharmacy.  Please allow 2 business days for your refill to be completed.    Our clinic offers telephone visits and e visits.  Please ask one of your team members to explain more.      Use [employer] (secure email communication and access to your chart) to send your primary care provider a message or make an appointment. Ask someone on your Team how to sign up for [employer].

## 2019-06-29 DIAGNOSIS — E55.9 VITAMIN D DEFICIENCY: ICD-10-CM

## 2019-06-29 DIAGNOSIS — E78.5 HYPERLIPIDEMIA WITH TARGET LDL LESS THAN 130: ICD-10-CM

## 2019-06-29 DIAGNOSIS — Z12.11 COLON CANCER SCREENING: Primary | ICD-10-CM

## 2019-06-29 LAB
ALBUMIN SERPL-MCNC: 3.3 G/DL (ref 3.4–5)
ALP SERPL-CCNC: 75 U/L (ref 40–150)
ALT SERPL W P-5'-P-CCNC: 41 U/L (ref 0–50)
ANION GAP SERPL CALCULATED.3IONS-SCNC: 6 MMOL/L (ref 3–14)
AST SERPL W P-5'-P-CCNC: 27 U/L (ref 0–45)
BILIRUB SERPL-MCNC: 0.3 MG/DL (ref 0.2–1.3)
BUN SERPL-MCNC: 9 MG/DL (ref 7–30)
CALCIUM SERPL-MCNC: 8.5 MG/DL (ref 8.5–10.1)
CHLORIDE SERPL-SCNC: 106 MMOL/L (ref 94–109)
CHOLEST SERPL-MCNC: 286 MG/DL
CO2 SERPL-SCNC: 29 MMOL/L (ref 20–32)
CREAT SERPL-MCNC: 0.7 MG/DL (ref 0.52–1.04)
ERYTHROCYTE [DISTWIDTH] IN BLOOD BY AUTOMATED COUNT: 12.4 % (ref 10–15)
GFR SERPL CREATININE-BSD FRML MDRD: >90 ML/MIN/{1.73_M2}
GLUCOSE SERPL-MCNC: 94 MG/DL (ref 70–99)
HCT VFR BLD AUTO: 38.5 % (ref 35–47)
HDLC SERPL-MCNC: 45 MG/DL
HGB BLD-MCNC: 12.9 G/DL (ref 11.7–15.7)
LDLC SERPL CALC-MCNC: 184 MG/DL
MCH RBC QN AUTO: 31 PG (ref 26.5–33)
MCHC RBC AUTO-ENTMCNC: 33.5 G/DL (ref 31.5–36.5)
MCV RBC AUTO: 93 FL (ref 78–100)
NONHDLC SERPL-MCNC: 241 MG/DL
PLATELET # BLD AUTO: 291 10E9/L (ref 150–450)
POTASSIUM SERPL-SCNC: 4.5 MMOL/L (ref 3.4–5.3)
PROT SERPL-MCNC: 7.3 G/DL (ref 6.8–8.8)
RBC # BLD AUTO: 4.16 10E12/L (ref 3.8–5.2)
SODIUM SERPL-SCNC: 141 MMOL/L (ref 133–144)
TRIGL SERPL-MCNC: 287 MG/DL
TSH SERPL DL<=0.005 MIU/L-ACNC: 2.72 MU/L (ref 0.4–4)
WBC # BLD AUTO: 6.7 10E9/L (ref 4–11)

## 2019-06-29 PROCEDURE — 82306 VITAMIN D 25 HYDROXY: CPT | Performed by: FAMILY MEDICINE

## 2019-06-29 PROCEDURE — 84443 ASSAY THYROID STIM HORMONE: CPT | Performed by: FAMILY MEDICINE

## 2019-06-29 PROCEDURE — 36415 COLL VENOUS BLD VENIPUNCTURE: CPT | Performed by: FAMILY MEDICINE

## 2019-06-29 PROCEDURE — 80061 LIPID PANEL: CPT | Performed by: FAMILY MEDICINE

## 2019-06-29 PROCEDURE — 80053 COMPREHEN METABOLIC PANEL: CPT | Performed by: FAMILY MEDICINE

## 2019-06-29 PROCEDURE — 85027 COMPLETE CBC AUTOMATED: CPT | Performed by: FAMILY MEDICINE

## 2019-07-02 LAB
DEPRECATED CALCIDIOL+CALCIFEROL SERPL-MC: <42 UG/L (ref 20–75)
VITAMIN D2 SERPL-MCNC: <5 UG/L
VITAMIN D3 SERPL-MCNC: 37 UG/L

## 2019-07-09 ENCOUNTER — TRANSFERRED RECORDS (OUTPATIENT)
Dept: HEALTH INFORMATION MANAGEMENT | Facility: CLINIC | Age: 64
End: 2019-07-09

## 2019-07-10 ENCOUNTER — TRANSFERRED RECORDS (OUTPATIENT)
Dept: HEALTH INFORMATION MANAGEMENT | Facility: CLINIC | Age: 64
End: 2019-07-10

## 2019-07-18 ENCOUNTER — NURSE TRIAGE (OUTPATIENT)
Dept: FAMILY MEDICINE | Facility: CLINIC | Age: 64
End: 2019-07-18

## 2019-07-18 NOTE — TELEPHONE ENCOUNTER
Reason for Call:  Other call back    Detailed comments: Pt has couple questions.   Lymph nodes in neck swollen for couple weeks wondering if should be seen.  Also she has been seen at [facility] for a shoulder problem  They are suggesting surgery and so now she is wondering if can switch to [facility]      Phone Number Patient can be reached at: Home number on file [phone] (home)    Best Time: Best time is between 11:30 and 12:30    Can we leave a detailed message on this number? YES    Call taken on 7/18/2019 at 8:11 AM by Jeaneth Oreilly

## 2019-07-18 NOTE — TELEPHONE ENCOUNTER
"Node is direct below the ear at the base of the neck.  No actual ball is felt.   Pt was tried to be given an appt but unable to come in, Pt declined wanting to be seen in urgent care setting. Pt stated she will schedule appt after vacation.        Additional Information    Negative: Sounds like a life-threatening emergency to the triager    Negative: Sore throat is the main symptom and has swollen node in the neck that is < 1 inch (2.5 cm) in size    Negative: Node is in the neck and causes difficulty breathing    Negative: Patient sounds very sick or weak to the triager    Negative: Node is in the neck and can't swallow fluids    Negative: Fever > 103 F (39.4 C)    Negative: Lump or swelling in groin and pulsating (like heartbeat)    Negative: Single large node and size > 1 inch (2.5 cm)    Negative: Overlying skin is red    Negative: Rapid increase in size of node over several hours    Negative: Tender node in the groin and has a sore, scratch, cut, or painful red area on that leg    Negative: Tender node in the armpit and has a sore, scratch, cut, or painful red area on that arm    Negative: Tender node in the neck and also has a sore throat with minimal/no runny nose or cough    Negative: Fever present > 3 days (72 hours)    Negative: Large nodes at multiple locations    Negative: Very tender to the touch but no fever    Negative: Patient wants to be seen    Answer Assessment - Initial Assessment Questions  1. LOCATION: \"Where is the swollen node located?\" \"Is the matching node on the other side of the body also swollen?\"       Left collar bone area    2. SIZE: \"How big is the node?\" (Inches or centimeters) (or compare to common objects such as pea, bean, marble, golf ball)       Golf ball    3. ONSET: \"When did the swelling start?\"       Couple of weeks,     4. NECK NODES: \"Is there a sore throat, runny nose or other symptoms of a cold?\"       No other symptoms    5. GROIN OR ARMPIT NODES: \"Is there a sore, " "scratch, cut or painful red area on that arm or leg?\"       Shoulder injury rotator cuff injury    6. FEVER: \"Do you have a fever?\" If so, ask: \"What is it, how was it measured, and when did it start?\"       No fever    7. CAUSE: \"What do you think is causing the swollen lymph nodes?\"      Possible injury    8. OTHER SYMPTOMS: \"Do you have any other symptoms?\"      A little tender    9. PREGNANCY: \"Is there any chance you are pregnant?\" \"When was your last menstrual period?\"      na    Protocols used: LYMPH NODES - [id number]      Jelani Tabor RN   [facility]    "

## 2019-07-19 ENCOUNTER — TRANSFERRED RECORDS (OUTPATIENT)
Dept: HEALTH INFORMATION MANAGEMENT | Facility: CLINIC | Age: 64
End: 2019-07-19

## 2019-07-22 DIAGNOSIS — E78.5 HYPERLIPIDEMIA WITH TARGET LDL LESS THAN 130: Primary | ICD-10-CM

## 2019-07-22 DIAGNOSIS — E78.1 HYPERTRIGLYCERIDEMIA: ICD-10-CM

## 2019-07-22 RX ORDER — OMEGA-3-ACID ETHYL ESTERS 1 G/1
2 CAPSULE, LIQUID FILLED ORAL 2 TIMES DAILY
Qty: 360 CAPSULE | Refills: 1 | Status: SHIPPED | OUTPATIENT
Start: 2019-07-22 | End: 2020-12-16

## 2019-08-01 ENCOUNTER — TELEPHONE (OUTPATIENT)
Dept: FAMILY MEDICINE | Facility: CLINIC | Age: 64
End: 2019-08-01

## 2019-08-01 NOTE — TELEPHONE ENCOUNTER
Reason for Call:  Same Day Appointment, Requested Provider:  Phuong Story MD    PCP: Phuong Story    Reason for visit: The patient wants to get in to see Dr. Story for a preop. She is having shoulder surgery on 08/27/2019. She only wants to see Dr. Story.    Can we leave a detailed message on this number? YES    Phone number patient can be reached at: Cell number on file:    Telephone Information:   Mobile [phone]     Best Time: Anytime    Call taken on 8/1/2019 at 10:23 AM by Merry Johnston

## 2019-08-01 NOTE — TELEPHONE ENCOUNTER
That day won't work for her.   Any other time?  Will MyChart schedule in .  Gladys Bhakta, Clinic Receptionist

## 2019-08-02 ENCOUNTER — HOSPITAL ENCOUNTER (OUTPATIENT)
Facility: CLINIC | Age: 64
Discharge: HOME | End: 2019-08-02
Attending: INTERNAL MEDICINE | Admitting: INTERNAL MEDICINE
Payer: COMMERCIAL

## 2019-08-02 VITALS
HEART RATE: 62 BPM | OXYGEN SATURATION: 94 % | BODY MASS INDEX: 31.28 KG/M2 | SYSTOLIC BLOOD PRESSURE: 121 MMHG | WEIGHT: 170 LBS | RESPIRATION RATE: 18 BRPM | HEIGHT: 62 IN | DIASTOLIC BLOOD PRESSURE: 65 MMHG

## 2019-08-02 LAB — COLONOSCOPY: NORMAL

## 2019-08-02 PROCEDURE — G0500 MOD SEDAT ENDO SERVICE >5YRS: HCPCS | Performed by: INTERNAL MEDICINE

## 2019-08-02 PROCEDURE — 45378 DIAGNOSTIC COLONOSCOPY: CPT | Performed by: INTERNAL MEDICINE

## 2019-08-02 PROCEDURE — G0121 COLON CA SCRN NOT HI RSK IND: HCPCS | Performed by: INTERNAL MEDICINE

## 2019-08-02 PROCEDURE — 25000128 H RX IP 250 OP 636: Performed by: INTERNAL MEDICINE

## 2019-08-02 RX ORDER — ONDANSETRON 2 MG/ML
4 INJECTION INTRAMUSCULAR; INTRAVENOUS
Status: DISCONTINUED | OUTPATIENT
Start: 2019-08-02 | End: 2019-08-02 | Stop reason: HOSPADM

## 2019-08-02 RX ORDER — LIDOCAINE 40 MG/G
CREAM TOPICAL
Status: DISCONTINUED | OUTPATIENT
Start: 2019-08-02 | End: 2019-08-02 | Stop reason: HOSPADM

## 2019-08-02 RX ORDER — FENTANYL CITRATE 50 UG/ML
INJECTION, SOLUTION INTRAMUSCULAR; INTRAVENOUS PRN
Status: DISCONTINUED | OUTPATIENT
Start: 2019-08-02 | End: 2019-08-02 | Stop reason: HOSPADM

## 2019-08-02 ASSESSMENT — MIFFLIN-ST. JEOR: SCORE: 1284.36

## 2019-08-02 NOTE — DISCHARGE INSTRUCTIONS
DIVERTICULOSIS  You have diverticulosis. This means that small pouches have formed in the wall of the colon (large intestine). Most often, this problem causes no symptoms. But the pouches in the colon are at risk for becoming infected or inflamed. When this happens, the condition is called diverticulitis.  The doctor will talk with you about how to manage your condition. Diet changes may be all that are needed to help control diverticulosis and prevent progression to diverticulitis. If you develop diverticulitis, other treatments will likely be needed.  HOME CARE  Medications: You may be told to take fiber supplements daily. Fiber adds bulk to the stool so that it passes through the colon more easily. Stool softeners may be recommended. You may also be given medications for pain relief. Be sure to take all medications as directed.  General Care:    Eat unprocessed foods that are high in fiber. Whole grains, fruits, and vegetables are good choices.    Drink 6 to 8 glasses of water daily.    Watch for changes in your bowel movements. Tell the doctor if you notice any changes.    Begin an exercise program. Ask your doctor how to get started.    Get plenty of rest and sleep.  FOLLOW UP as advised by the doctor or our staff. Regular visits may be needed to check on your health. Be sure to keep all your appointments.  GET PROMPT MEDICAL ATTENTION if any of the following occurs:    Fever of 100.6 F (38 C) or higher, or as directed by your healthcare provider    Severe cramps in the lower left side of the abdomen or pain that is getting progressively worse.    Tenderness in the lower left side of the abdomen or worsening pain throughout the abdomen    Diarrhea or constipation that does not improve within 24 hours    Nausea and vomiting    Bleeding from the rectum      7321-1523 [facility], [address] [place] [address], [place] [address]. All rights reserved. This information is not intended as a substitute for professional  medical care. Always follow your healthcare professional's instructions.    Eating a High-Fiber Diet  Fiber is what gives strength and structure to plants. Most grains, beans, vegetables, and fruits contain fiber. Foods rich in fiber are often low in calories and fat, and they fill you up more. They may also reduce your risks for certain health problems. To find out the amount of fiber in canned, packaged, or frozen foods, read the  Nutrition Facts  label. It tells you how much fiber is in a serving.      Types of Fiber and Their Benefits  There are two types of fiber: insoluble and soluble. They both aid digestion and help you maintain a healthy weight.  Insoluble fiber: This is found in whole grains, cereals, certain fruits and vegetables (such as apple skin, corn, and carrots). Insoluble fiber may prevent constipation and reduce the risk of certain types of cancer.   Soluble fiber: This type of fiber is in oats, beans, and certain fruits and vegetables (such as strawberries and peas). Soluble fiber can reduce cholesterol (which may help lower the risk of heart disease), and helps control blood sugar levels.  Look for High-Fiber Foods  Whole-grain breads and cereals: Try to eat 6-8 ounces a day. Include wheat and oat bran cereals, whole-wheat muffins or toast, and corn tortillas in your meals.  Fruits: Try to eat 2 cups a day. Apples, oranges, strawberries, pears, and bananas are good sources. (Note: Fruit juice is low in fiber.)  Vegetables: Try to eat 3 cups a day. Add asparagus, carrots, broccoli, peas, and corn to your meals.  Legumes (beans): One cup of cooked lentils gives you over 15 grams of fiber. Try navy beans, lentils, and chickpeas.  Seeds:  A small handful of seeds gives you about 3 grams of fiber. Try sunflower seeds.    Keep Track of Your Fiber  A healthy diet includes 31 grams of fiber a day if you have a 2,000-calorie diet. Keep track of how much fiber you eat. Start by reading food labels. Then  eat a variety of foods high in fiber. Ask your doctor about supplemental fiber products.            1533-8090 [employer], [address] [facility], [place] [address]. All rights reserved. This information is not intended as a substitute for professional medical care. Always follow your healthcare professional's instructions.    HIGH FIBER DIET  Fiber is present in all fruits, vegetables, cereals and grains. Fiber passes through the body undigested. A high fiber diet helps food move through the intestinal tract. The added bulk is helpful in preventing constipation. In people with diverticulosis it serves to clean out the pouches along the colon wall while preventing new ones from forming. A high fiber diet also reduces the risk of colon cancer, decreases blood cholesterol and prevents high blood sugar in people with diabetes.    The foods listed below are high in fiber and should be included in your diet. If you are not used to high fiber foods, start with 1 or 2 foods from this list. Every 3-4 days add a new one to your diet until you are eating 4 high fiber foods per day. This should give you 20-35 Gm of fiber/day. It is also important to drink a lot of water when you are on this diet (6-8 glasses a day). Water causes the fiber to swell and increases the benefit.    FOODS HIGH IN DIETARY FIBER:  BREADS: Made with 100% whole wheat flour; miley, wheat or rye crackers; tortillas, bran muffins  CEREALS: Whole grain cereal with bran (Chex, Raisin Bran, Corn Bran), oatmeal, rolled oats, granola, wheat flakes, brown rice  NUTS: Any nuts  FRUITS: All fresh fruits along with edible skins, (bananas, citrus fruit, mangoes, pears, prunes, raisins, apples, pineapple, apricot, melon, jams and marmalades), fruit juices (especially prune juice)  VEGETABLES: All types, preferably raw or lightly cooked: especially, celery, eggplant, potatoes, spinach, broccoli, brussel sprouts, winter squash, carrots, cauliflower, soybeans,  lentils, fresh and dried beans of all kinds  OTHER: Popcorn, any spices      6247-0905 [facility], [address] [place] [address], [place] [address]. All rights reserved. This information is not intended as a substitute for professional medical care. Always follow your healthcare professional's instructions.

## 2019-08-02 NOTE — LETTER
July 11, 2019      Edwina Zhou  [address] [place] [facility] [address]        Thank you for choosing [facility]. You are scheduled for the following service(s).   Please be aware that coverage of these services is subject to the terms and limitations of your health insurance plan.  Call member services at your health plan with any benefit or coverage questions.    Date:  Friday August 2, 2019             Procedure:  COLONOSCOPY  Doctor:        Dr Shanks   Arrival Time:  0730  *Check in at Emergency/Endoscopy desk*  Procedure Time:  0800      Location:   [facility]        Endoscopy Department, First Floor (Enter through ER Doors) *        [address] [place], [place] [address]      [phone] or [phone] ([facility]) to reschedule      MIRALAX -GATORADE  PREP  Colonoscopy is the most accurate test to detect colon polyps and colon cancer; and the only test where polyps can be removed. During this procedure, a doctor examines the lining of your large intestine and rectum through a flexible tube.   Transportation  You must arrange for a ride for the day of your procedure with a responsible adult. A taxi , Uber, etc, is not an option unless you are accompanied by a responsible adult. If you fail to arrange transportation with a responsible adult, your procedure will be cancelled and rescheduled.    Purchase the  following supplies at your local pharmacy:  - 2 (two) bisacodyl tablets: each tablet contains 5 mg.  (Dulcolax  laxative NOT Dulcolax  stool softener)   - 1 (one) 8.3 oz bottle of Polyethylene Glycol (PEG) 3350 Powder   (MiraLAX , Smooth LAX , ClearLAX  or equivalent)  - 64 oz Gatorade    Regular Gatorade, Gatorade G2 , Powerade , Powerade Zero  or Pedialyte  is acceptable. Red colored flavors are not allowed; all other colors (yellow, green, orange, purple and blue) are okay. It is also okay to buy two 2.12 oz packets of powdered Gatorade that  can be mixed with water to a total volume of 64 oz of liquid.  - 1 (one) 10 oz bottle of Magnesium Citrate (Red colored flavors are not allowed)  It is also okay for you to use a 0.5 oz package of powdered magnesium citrate (17 g) mixed with 10 oz of water.      PREPARATION FOR COLONOSCOPY    7 days before:    Discontinue fiber supplements and medications containing iron. This includes Metamucil  and Fibercon ; and multivitamins with iron.    3 days before:    Begin a low-fiber diet. A low-fiber diet helps making the cleanout more effective.     Examples of a low-fiber diet include (but are not limited to): white bread, white rice, pasta, crackers, fish, chicken, eggs, ground beef, creamy peanut butter, cooked/steamed/boiled vegetables, canned fruit, bananas, melons, milk, plain yogurt cheese, salad dressing and other condiments.     The following are not allowed on a low-fiber diet: seeds, nuts, popcorn, bran, whole wheat, corn, quinoa, raw fruits and vegetables, berries and dried fruit, beans and lentils.    For additional details on low-fiber diet, please refer to the table on the last page.    2 days before:    Continue the low-fiber diet.     Drink at least 8 glasses of water throughout the day.     Stop eating solid foods at 11:45 pm.  1.   2. 1 day before:    In the morning: begin a clear liquid diet (liquids you can see through).     Examples of a clear liquid diet include: water, clear broth or bouillon, Gatorade, Pedialyte or Powerade, carbonated and non-carbonated soft drinks (Sprite , 7-Up , ginger ale), strained fruit juices without pulp (apple, white grape, white cranberry), Jell-O  and popsicles.     The following are not allowed on a clear liquid diet: red liquids, alcoholic beverages, dairy products (milk, creamer, and yogurt), protein shakes, creamy broths, juice with pulp and chewing tobacco.    At noon: take 2 (two) bisacodyl tablets     At 4 (and no later than 6pm): start drinking the  Miralax-Gatorade preparation (8.3 oz of Miralax mixed with 64 oz of Gatorade in a large pitcher). Drink 1(one) 8 oz glass every 15 minutes thereafter, until the mixture is gone.    COLON CLEANSING TIPS: drink adequate amounts of fluids before and after your colon cleansing to prevent dehydration. Stay near a toilet because you will have diarrhea. Even if you are sitting on the toilet, continue to drink the cleansing solution every 15 minutes. If you feel nauseous or vomit, rinse your mouth with water, take a 15 to 30-minute-break and then continue drinking the solution. You will be uncomfortable until the stool has flushed from your colon (in about 2 to 4 hours). You may feel chilled.    Day of your procedure  You may take all of your morning medications including blood pressure medications, blood thinners (if you have not been instructed to stop these by our office), methadone, anti-seizure medications with sips of water 3 hours prior to your procedure or earlier. Do not take insulin or vitamins prior to your procedure. Continue the clear liquid diet.       4 hours prior: drink 10 oz of magnesium citrate. It may be easier to drink it with a straw.    STOP consuming all liquids after that.     Do not take anything by mouth during this time.     Allow extra time to travel to your procedure as you may need to stop and use a restroom along the way.    You are ready for the procedure, if you followed all instructions and your stool is no longer formed, but clear or yellow liquid. If you are unsure whether your colon is clean, please call our office at [phone] before you leave for your appointment.    Bring the following to your procedure:  - Insurance Card/Photo ID.   - List of current medications including over-the-counter medications and supplements.   - Your rescue inhaler if you currently use one to control asthma.      Canceling or rescheduling your appointment:   If you must cancel or reschedule your  appointment, please call [phone] as soon as possible.      COLONOSCOPY PRE-PROCEDURE CHECKLIST    If you have diabetes, ask your regular doctor for diet and medication restrictions.  If you take an anticoagulant or anti-platelet medication (such as Coumadin , Lovenox , Pradaxa , Xarelto , Eliquis , etc.), please call your primary doctor for advice on holding this medication.  If you take aspirin you may continue to do so.  If you are or may be pregnant, please discuss the risks and benefits of this procedure with your doctor.        What happens during a colonoscopy?    Plan to spend up to two hours, starting at registration time, at the endoscopy center the day of your procedure. The colonoscopy takes an average of 15 to 30 minutes. Recovery time is about 30 minutes.      Before the exam:    You will change into a gown.    Your medical history and medication list will be reviewed with you, unless that has been done over the phone prior to the procedure.     A nurse will insert an intravenous (IV) line into your hand or arm.    The doctor will meet with you and will give you a consent form to sign.  1. During the exam:     Medicine will be given through the IV line to help you relax.     Your heart rate and oxygen levels will be monitored. If your blood pressure is low, you may be given fluids through the IV line.     The doctor will insert a flexible hollow tube, called a colonoscope, into your rectum. The scope will be advanced slowly through the large intestine (colon).    You may have a feeling of fullness or pressure.     If an abnormal tissue or a polyp is found, the doctor may remove it through the endoscope for closer examination, or biopsy. Tissue removal is painless    After the exam:           Any tissue samples removed during the exam will be sent to a lab for evaluation. It may take 5-7 working days for you to be notified of the results.     A nurse will provide you with complete discharge  instructions before you leave the endoscopy center. Be sure to ask the nurse for specific instructions if you take blood thinners such as Aspirin, Coumadin or Plavix.     The doctor will prepare a full report for you and for the physician who referred you for the procedure.     Your doctor will talk with you about the initial results of your exam.      Medication given during the exam will prohibit you from driving for the rest of the day.     Following the exam, you may resume your normal diet. Your first meal should be light, no greasy foods. Avoid alcohol until the next day.     You may resume your regular activities the day after the procedure.         LOW-FIBER DIET    Foods RECOMMENDED Foods to AVOID   Breads, Cereal, Rice and Pasta:   White bread, rolls, biscuits, croissant and marleny toast.   Waffles, Greek toast and pancakes.   White rice, noodles, pasta, macaroni and peeled cooked potatoes.   Plain crackers and saltines.   Cooked cereals: farina, cream of rice.   Cold cereals: Puffed Rice , Rice Krispies , Corn Flakes  and Special K    Breads, Cereal, Rice and Pasta:   Breads or rolls with nuts, seeds or fruit.   Whole wheat, pumpernickel, rye breads and cornbread.   Potatoes with skin, brown or wild rice, and kasha (buckwheat).     Vegetables:   Tender cooked and canned vegetables without seeds: carrots, asparagus tips, green or wax beans, pumpkin, spinach, lima beans. Vegetables:   Raw or steamed vegetables.   Vegetables with seeds.   Sauerkraut.   Winter squash, peas, broccoli, Brussel sprouts, cabbage, onions, cauliflower, baked beans, peas and corn.   Fruits:   Strained fruit juice.   Canned fruit, except pineapple.   Ripe bananas and melon. Fruits:   Prunes and prune juice.   Raw fruits.   Dried fruits: figs, dates and raisins.   Milk/Dairy:   Milk: plain or flavored.   Yogurt, custard and ice cream.   Cheese and cottage cheese Milk/Dairy:     Meat and other proteins:   ground, well-cooked tender  beef, lamb, ham, veal, pork, fish, poultry and organ meats.   Eggs.   Peanut butter without nuts. Meat and other proteins:   Tough, fibrous meats with gristle.   Dry beans, peas and lentils.   Peanut butter with nuts.   Tofu.   Fats, Snack, Sweets, Condiments and Beverages:   Margarine, butter, oils, mayonnaise, sour cream and salad dressing, plain gravy.   Sugar, hard candy, clear jelly, honey and syrup.   Spices, cooked herbs, bouillon, broth and soups made with allowed vegetable, ketchup and mustard.   Coffee, tea and carbonated drinks.   Plain cakes, cookies and pretzels.   Gelatin, plain puddings, custard, ice cream, sherbet and popsicles. Fats, Snack, Sweets, Condiments and Beverages:   Nuts, seeds and coconut.   Jam, marmalade and preserves.   Pickles, olives, relish and horseradish.   All desserts containing nuts, seeds, dried fruit and coconut; or made from whole grains or bran.   Candy made with nuts or seeds.   Popcorn.                     DIRECTIONS TO THE ENDOSCOPY DEPARTMENT     From the north ([facility])  Take [address], exit on [address] [address]. Get into the left hand [place], turn left (east), go one-half mile to [address] and turn left. Go north to the first stoplight, take a right on [address] and follow it to the Emergency entrance.    From the south ([facility])  Take [address]N to the [address] split and exit on [address] [address]. On [address] [address], turn left (west) to [address]. Turn right (north) on [address]. Go north to the first stoplight, take a right on [address] and follow it to the Emergency entrance.    From the east via 35E ([facility])  Take [address] south to [address] [address] exit. Turn right on [address] [address]. Go west to [address]. Turn right (north) on [address]. Go to the first stoplight, take a right and follow on [address] to the Emergency entrance.    From the east via [address] ([facility])  [facility] [address] [address]  to [address]. Turn left (south) on [address] to [address]. Turn left (east) on [address] and follow it to the Emergency entrance.    From the west via [address] ([place])  Take [address] east to [address]. Turn right (south) on [address] to [address]. Turn left (east) on [address] and follow it to the Emergency entrance.
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
## 2019-08-02 NOTE — TELEPHONE ENCOUNTER
We can offer is 8/14 at 11:40.  Her schedule is very full.  If you only want to see her, please be flexible.     Left message notifying patient of this appointment.    Marie Walker

## 2019-08-14 ENCOUNTER — OFFICE VISIT (OUTPATIENT)
Dept: FAMILY MEDICINE | Facility: CLINIC | Age: 64
End: 2019-08-14
Payer: COMMERCIAL

## 2019-08-14 VITALS
HEIGHT: 62 IN | WEIGHT: 174 LBS | DIASTOLIC BLOOD PRESSURE: 62 MMHG | OXYGEN SATURATION: 100 % | SYSTOLIC BLOOD PRESSURE: 120 MMHG | BODY MASS INDEX: 32.02 KG/M2 | TEMPERATURE: 98.3 F | HEART RATE: 83 BPM

## 2019-08-14 DIAGNOSIS — R53.83 OTHER FATIGUE: ICD-10-CM

## 2019-08-14 DIAGNOSIS — Z01.818 PREOP GENERAL PHYSICAL EXAM: Primary | ICD-10-CM

## 2019-08-14 DIAGNOSIS — R22.2 FULLNESS OF SUPRACLAVICULAR FOSSA: ICD-10-CM

## 2019-08-14 DIAGNOSIS — K20.0 EOSINOPHILIC ESOPHAGITIS: ICD-10-CM

## 2019-08-14 DIAGNOSIS — S46.812D TRAUMATIC TEAR OF SUPRASPINATUS TENDON OF LEFT SHOULDER, SUBSEQUENT ENCOUNTER: ICD-10-CM

## 2019-08-14 DIAGNOSIS — R53.82 CHRONIC FATIGUE: ICD-10-CM

## 2019-08-14 DIAGNOSIS — S46.012D TRAUMATIC TEAR OF LEFT ROTATOR CUFF, UNSPECIFIED TEAR EXTENT, SUBSEQUENT ENCOUNTER: ICD-10-CM

## 2019-08-14 DIAGNOSIS — R13.10 PILL DYSPHAGIA: ICD-10-CM

## 2019-08-14 LAB
BASOPHILS # BLD AUTO: 0 10E9/L (ref 0–0.2)
BASOPHILS NFR BLD AUTO: 0.5 %
DIFFERENTIAL METHOD BLD: NORMAL
EOSINOPHIL # BLD AUTO: 0.3 10E9/L (ref 0–0.7)
EOSINOPHIL NFR BLD AUTO: 3.7 %
ERYTHROCYTE [DISTWIDTH] IN BLOOD BY AUTOMATED COUNT: 12.2 % (ref 10–15)
HCT VFR BLD AUTO: 42.1 % (ref 35–47)
HGB BLD-MCNC: 14 G/DL (ref 11.7–15.7)
LYMPHOCYTES # BLD AUTO: 2.4 10E9/L (ref 0.8–5.3)
LYMPHOCYTES NFR BLD AUTO: 30.3 %
MCH RBC QN AUTO: 31.2 PG (ref 26.5–33)
MCHC RBC AUTO-ENTMCNC: 33.3 G/DL (ref 31.5–36.5)
MCV RBC AUTO: 94 FL (ref 78–100)
MONOCYTES # BLD AUTO: 0.6 10E9/L (ref 0–1.3)
MONOCYTES NFR BLD AUTO: 7.7 %
NEUTROPHILS # BLD AUTO: 4.6 10E9/L (ref 1.6–8.3)
NEUTROPHILS NFR BLD AUTO: 57.8 %
PLATELET # BLD AUTO: 302 10E9/L (ref 150–450)
RBC # BLD AUTO: 4.49 10E12/L (ref 3.8–5.2)
WBC # BLD AUTO: 7.9 10E9/L (ref 4–11)

## 2019-08-14 PROCEDURE — 80053 COMPREHEN METABOLIC PANEL: CPT | Performed by: FAMILY MEDICINE

## 2019-08-14 PROCEDURE — 99215 OFFICE O/P EST HI 40 MIN: CPT | Performed by: FAMILY MEDICINE

## 2019-08-14 PROCEDURE — 36415 COLL VENOUS BLD VENIPUNCTURE: CPT | Performed by: FAMILY MEDICINE

## 2019-08-14 PROCEDURE — 85025 COMPLETE CBC W/AUTO DIFF WBC: CPT | Performed by: FAMILY MEDICINE

## 2019-08-14 PROCEDURE — 93000 ELECTROCARDIOGRAM COMPLETE: CPT | Performed by: FAMILY MEDICINE

## 2019-08-14 RX ORDER — UBIDECARENONE 100 MG
CAPSULE ORAL DAILY
COMMUNITY
End: 2021-12-17

## 2019-08-14 ASSESSMENT — MIFFLIN-ST. JEOR: SCORE: 1302.51

## 2019-08-14 NOTE — PROGRESS NOTES
Faxed preop over to [facility] in [place] at [phone]      Lazara Davenport

## 2019-08-14 NOTE — PATIENT INSTRUCTIONS
Please hold all your supplements with exception of melatonin and loratidine, which you may still take.   Do not take any medications or supplements the morning of surgery.   Do not take any Aspirin, Ibuprofen or Naproxen from 8/17/2019 until after your surgery.  You may take tylenol only for pain until after surgery.      Before Your Surgery      Call your surgeon if there is any change in your health. This includes signs of a cold or flu (such as a sore throat, runny nose, cough, rash or fever).    Do not smoke, drink alcohol or take over the counter medicine (unless your surgeon or primary care doctor tells you to) for the 24 hours before and after surgery.    If you take prescribed drugs: Follow your doctor s orders about which medicines to take and which to stop until after surgery.    Eating and drinking prior to surgery: follow the instructions from your surgeon    Take a shower or bath the night before surgery. Use the soap your surgeon gave you to gently clean your skin. If you do not have soap from your surgeon, use your regular soap. Do not shave or scrub the surgery site.  Wear clean pajamas and have clean sheets on your bed.                Thank you for choosing [facility]  for your Health Care. It was a pleasure seeing you at your visit today. Please contact us with any questions or concerns you may have.                   Phuong Story MD                                  To reach your [facility] care team after hours call:   [phone]    Our clinic hours are:     Monday- 7:30 am - 7:00 pm                             Tuesday through Friday- 7:30 am - 5:00 pm                                        Saturday- 8:00 am - 12:00 pm                  Phone:  [phone]    Our pharmacy hours are:     Monday  8:00 am to 7:00 pm      Tuesday through Friday 8:00am to 6:00pm                        Saturday - 9:00 am to 1:00 pm      Sunday : Closed.               Phone:  [phone]      There is also information available at our web site:  [email or web address]    If your provider ordered any lab tests and you do not receive the results within 10 business days, please call the clinic.    If you need a medication refill please contact your pharmacy.  Please allow 2 business days for your refill to be completed.    Our clinic offers telephone visits and e visits.  Please ask one of your team members to explain more.      Use [employer] (secure email communication and access to your chart) to send your primary care provider a message or make an appointment. Ask someone on your Team how to sign up for [employer].
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
## 2019-08-15 LAB
ALBUMIN SERPL-MCNC: 3.6 G/DL (ref 3.4–5)
ALP SERPL-CCNC: 78 U/L (ref 40–150)
ALT SERPL W P-5'-P-CCNC: 45 U/L (ref 0–50)
ANION GAP SERPL CALCULATED.3IONS-SCNC: 7 MMOL/L (ref 3–14)
AST SERPL W P-5'-P-CCNC: 27 U/L (ref 0–45)
BILIRUB SERPL-MCNC: 0.3 MG/DL (ref 0.2–1.3)
BUN SERPL-MCNC: 15 MG/DL (ref 7–30)
CALCIUM SERPL-MCNC: 8.8 MG/DL (ref 8.5–10.1)
CHLORIDE SERPL-SCNC: 104 MMOL/L (ref 94–109)
CO2 SERPL-SCNC: 27 MMOL/L (ref 20–32)
CREAT SERPL-MCNC: 0.81 MG/DL (ref 0.52–1.04)
GFR SERPL CREATININE-BSD FRML MDRD: 78 ML/MIN/{1.73_M2}
GLUCOSE SERPL-MCNC: 101 MG/DL (ref 70–99)
POTASSIUM SERPL-SCNC: 4.3 MMOL/L (ref 3.4–5.3)
PROT SERPL-MCNC: 7.4 G/DL (ref 6.8–8.8)
SODIUM SERPL-SCNC: 138 MMOL/L (ref 133–144)

## 2019-08-21 ENCOUNTER — HOSPITAL ENCOUNTER (OUTPATIENT)
Dept: ULTRASOUND IMAGING | Facility: CLINIC | Age: 64
Discharge: HOME | End: 2019-08-21
Attending: FAMILY MEDICINE | Admitting: FAMILY MEDICINE
Payer: COMMERCIAL

## 2019-08-21 DIAGNOSIS — R22.2 FULLNESS OF SUPRACLAVICULAR FOSSA: ICD-10-CM

## 2019-08-21 PROCEDURE — 76536 US EXAM OF HEAD AND NECK: CPT

## 2019-08-22 DIAGNOSIS — R22.1 MASS OF LEFT SIDE OF NECK: Primary | ICD-10-CM

## 2019-08-22 DIAGNOSIS — R22.2 SUPRACLAVICULAR MASS: ICD-10-CM

## 2019-08-22 NOTE — RESULT ENCOUNTER NOTE
Please call pt with results below:     No sonographic correlate for reported fullness/swelling in the left lower neck.   Recommend getting a CT scan of the soft-tissues of the neck - dx: left neck mass.   Triage, please place orders. Pended in Epic.     For additional lab test information, [email or web address] is an excellent reference.

## 2019-08-22 NOTE — RESULT ENCOUNTER NOTE
No this does not need to be done prior to surgery - unless this impacts her ability to swallow.  In that case, I would do it urgently.

## 2019-08-25 ENCOUNTER — HOSPITAL ENCOUNTER (OUTPATIENT)
Dept: CT IMAGING | Facility: CLINIC | Age: 64
Discharge: HOME | End: 2019-08-25
Attending: FAMILY MEDICINE | Admitting: FAMILY MEDICINE
Payer: COMMERCIAL

## 2019-08-25 DIAGNOSIS — R22.1 MASS OF LEFT SIDE OF NECK: ICD-10-CM

## 2019-08-25 DIAGNOSIS — R22.2 SUPRACLAVICULAR MASS: ICD-10-CM

## 2019-08-25 PROCEDURE — 70491 CT SOFT TISSUE NECK W/DYE: CPT

## 2019-08-25 PROCEDURE — 25000125 ZZHC RX 250: Performed by: FAMILY MEDICINE

## 2019-08-25 PROCEDURE — 25000128 H RX IP 250 OP 636: Performed by: FAMILY MEDICINE

## 2019-08-25 RX ORDER — IOPAMIDOL 755 MG/ML
90 INJECTION, SOLUTION INTRAVASCULAR ONCE
Status: COMPLETED | OUTPATIENT
Start: 2019-08-25 | End: 2019-08-25

## 2019-08-25 RX ADMIN — IOPAMIDOL 90 ML: 755 INJECTION, SOLUTION INTRAVENOUS at 10:39

## 2019-08-25 RX ADMIN — SODIUM CHLORIDE 60 ML: 9 INJECTION, SOLUTION INTRAVENOUS at 10:40

## 2019-08-26 NOTE — RESULT ENCOUNTER NOTE
"Please call radiology - she just had an US neck soft tissue, but the CT results says - radiologist advises a \"devoted US for further evaluation\" - does she need a thyroid US or does the US done suffice?  Based on both of these what is the next step recommended?  US?  Surgery evaluation?  Observation?"

## 2019-08-27 ENCOUNTER — TRANSFERRED RECORDS (OUTPATIENT)
Dept: HEALTH INFORMATION MANAGEMENT | Facility: CLINIC | Age: 64
End: 2019-08-27

## 2019-09-11 ENCOUNTER — TRANSFERRED RECORDS (OUTPATIENT)
Dept: HEALTH INFORMATION MANAGEMENT | Facility: CLINIC | Age: 64
End: 2019-09-11

## 2019-09-29 ENCOUNTER — HEALTH MAINTENANCE LETTER (OUTPATIENT)
Age: 64
End: 2019-09-29

## 2019-10-07 ENCOUNTER — TRANSFERRED RECORDS (OUTPATIENT)
Dept: HEALTH INFORMATION MANAGEMENT | Facility: CLINIC | Age: 64
End: 2019-10-07

## 2019-11-20 ENCOUNTER — TRANSFERRED RECORDS (OUTPATIENT)
Dept: HEALTH INFORMATION MANAGEMENT | Facility: CLINIC | Age: 64
End: 2019-11-20

## 2020-01-03 NOTE — PROGRESS NOTES
"Subjective   Edwina Zhou is a 64 year old female who presents to clinic today for the following health issues:    HPI   Acute Illness   Acute illness concerns: URI   Onset: 12/27/2019    Fever: YES    Chills/Sweats: YES     Headache (location?): YES- forehead     Sinus Pressure:no    Conjunctivitis:  no    Ear Pain: YES: right    Rhinorrhea: YES    Congestion: YES    Sore Throat: no     Cough: YES- sometimes productive     Wheeze: YES    Decreased Appetite: YES    Nausea: YES    Vomiting: no    Diarrhea:  YES    Dysuria/Freq.: no    Fatigue/Achiness: YES    Sick/Strep Exposure: YES- nephew had been dx with influenza B      Therapies Tried and outcome: dayquil and nyquil, robitussin- not effective     Ongoing cough, fever, SOB. Not improving with home remedies. Ongoing headache. Feels very fatigued.      Reviewed and updated as needed this visit by provider:  Tobacco  Allergies  Meds  Problems  Med Hx  Surg Hx  Fam Hx         Review of Systems   Constitutional, HEENT, cardiovascular, pulmonary, GI, , musculoskeletal, neuro, skin, endocrine and psych systems are negative, except as otherwise noted per HPI.      Objective   /68 (BP Location: Left arm, Cuff Size: Adult Regular)   Pulse 111   Temp 100.2  F (37.9  C) (Oral)   Ht 1.575 m (5' 2\")   Wt 81.2 kg (179 lb)   LMP 06/03/2010   SpO2 95%   BMI 32.74 kg/m   Body mass index is 32.74 kg/m .  Physical Exam   GENERAL: healthy, alert, well nourished, well hydrated, no distress  HENT: ear canals- normal; TMs- normal; Nose- normal; Mouth- no ulcers, no lesions  NECK: no tenderness, no adenopathy, no asymmetry, no masses, no stiffness; thyroid- normal to palpation  RESP:wheezing throughout, rhonchi RUL, after neb wheezing but more air movement, rhonchi persistent  CV: regular rates and rhythm, normal S1 S2, no S3 or S4 and no murmur, no click or rub -  MS: extremities- no gross deformities noted, no edema    Diagnostic Test Results  Flu-Negative  CXR " "- positive RUL, RML patchy infiltrate on my read.      Assessment & Plan   Edwina was seen today for uri.    Diagnoses and all orders for this visit:    Pneumonia of right middle lobe due to infectious organism (H)  Treat with steroid and antibiotic given SOB and wheezing that is prominent. Discussed use of inhaler as needed until symptoms improve for cough/wheeze. If not improving Monday follow up with us. If more acutely SOB go to ED>  -     azithromycin (ZITHROMAX) 250 MG tablet; Two tablets first day, then one tablet daily for four days  -     predniSONE (DELTASONE) 20 MG tablet; Take 2 tablets (40 mg) by mouth daily for 5 days  -     albuterol (PROAIR HFA/PROVENTIL HFA/VENTOLIN HFA) 108 (90 Base) MCG/ACT inhaler; Inhale 2 puffs into the lungs every 6 hours  -     guaiFENesin-codeine (GUAIFENESIN AC) 100-10 MG/5ML syrup; Take 5 mLs by mouth every 4 hours as needed for congestion    Fever  -     Influenza A/B antigen    Wheezing  -     albuterol (PROVENTIL) neb solution 2.5 mg  -     XR Chest 2 Views; Future           BMI:   Estimated body mass index is 31.83 kg/m  as calculated from the following:    Height as of 8/14/19: 1.575 m (5' 2\").    Weight as of 8/14/19: 78.9 kg (174 lb).           See Patient Instructions    No follow-ups on file.            MARTIN Reveles     [address] [address]  [email or web address]   Office: [phone]           "

## 2020-01-04 ENCOUNTER — OFFICE VISIT (OUTPATIENT)
Dept: FAMILY MEDICINE | Facility: CLINIC | Age: 65
End: 2020-01-04
Payer: COMMERCIAL

## 2020-01-04 ENCOUNTER — ANCILLARY PROCEDURE (OUTPATIENT)
Dept: GENERAL RADIOLOGY | Facility: CLINIC | Age: 65
End: 2020-01-04
Payer: COMMERCIAL

## 2020-01-04 VITALS
SYSTOLIC BLOOD PRESSURE: 110 MMHG | DIASTOLIC BLOOD PRESSURE: 68 MMHG | OXYGEN SATURATION: 95 % | HEART RATE: 111 BPM | TEMPERATURE: 100.2 F | BODY MASS INDEX: 32.94 KG/M2 | WEIGHT: 179 LBS | HEIGHT: 62 IN

## 2020-01-04 DIAGNOSIS — R06.2 WHEEZING: ICD-10-CM

## 2020-01-04 DIAGNOSIS — J18.9 PNEUMONIA OF RIGHT MIDDLE LOBE DUE TO INFECTIOUS ORGANISM: Primary | ICD-10-CM

## 2020-01-04 LAB
FLUAV+FLUBV AG SPEC QL: NEGATIVE
FLUAV+FLUBV AG SPEC QL: NEGATIVE
SPECIMEN SOURCE: NORMAL

## 2020-01-04 PROCEDURE — 71046 X-RAY EXAM CHEST 2 VIEWS: CPT | Mod: FY

## 2020-01-04 PROCEDURE — 99214 OFFICE O/P EST MOD 30 MIN: CPT | Mod: 25 | Performed by: NURSE PRACTITIONER

## 2020-01-04 PROCEDURE — 94640 AIRWAY INHALATION TREATMENT: CPT | Performed by: NURSE PRACTITIONER

## 2020-01-04 PROCEDURE — 87804 INFLUENZA ASSAY W/OPTIC: CPT | Performed by: NURSE PRACTITIONER

## 2020-01-04 RX ORDER — BENZONATATE 200 MG/1
200 CAPSULE ORAL 3 TIMES DAILY PRN
Qty: 30 CAPSULE | Refills: 0 | Status: CANCELLED | OUTPATIENT
Start: 2020-01-04 | End: 2020-01-14

## 2020-01-04 RX ORDER — PREDNISONE 20 MG/1
40 TABLET ORAL DAILY
Qty: 10 TABLET | Refills: 0 | Status: SHIPPED | OUTPATIENT
Start: 2020-01-04 | End: 2020-01-09

## 2020-01-04 RX ORDER — AZITHROMYCIN 250 MG/1
TABLET, FILM COATED ORAL
Qty: 6 TABLET | Refills: 0 | Status: SHIPPED | OUTPATIENT
Start: 2020-01-04 | End: 2020-12-07

## 2020-01-04 RX ORDER — ALBUTEROL SULFATE 90 UG/1
2 AEROSOL, METERED RESPIRATORY (INHALATION) EVERY 6 HOURS
Qty: 1 INHALER | Refills: 0 | Status: SHIPPED | OUTPATIENT
Start: 2020-01-04 | End: 2021-06-11

## 2020-01-04 RX ORDER — ALBUTEROL SULFATE 0.83 MG/ML
2.5 SOLUTION RESPIRATORY (INHALATION) ONCE
Status: COMPLETED | OUTPATIENT
Start: 2020-01-04 | End: 2020-01-04

## 2020-01-04 RX ORDER — CODEINE PHOSPHATE/GUAIFENESIN 10-100MG/5
5 LIQUID (ML) ORAL EVERY 4 HOURS PRN
Qty: 118 ML | Refills: 0 | Status: SHIPPED | OUTPATIENT
Start: 2020-01-04 | End: 2021-12-17

## 2020-01-04 RX ADMIN — ALBUTEROL SULFATE 2.5 MG: 0.83 SOLUTION RESPIRATORY (INHALATION) at 10:28

## 2020-01-04 ASSESSMENT — MIFFLIN-ST. JEOR: SCORE: 1325.19

## 2020-01-04 NOTE — PATIENT INSTRUCTIONS
Pneumonia          What is pneumonia?  Pneumonia is an infection of the lung that causes fluid to collect in the air sacs (alveoli). Symptoms include:  labored breathing   rapid breathing   sometimes painful breathing   coughing   fever, sometimes with chills.  Most rattly breathing is not pneumonia. Your child needs to see a healthcare provider to check if he or she has pneumonia.  What is the cause?  Pneumonia may be caused by viruses or by bacteria.  Viral pneumonia is usually milder than bacterial pneumonia. Bacterial pneumonia tends to occur more suddenly and cause higher fevers (often over 104 F, or 40 C).  Pneumonia is usually a complication of a cold. Although colds can be passed from person to person, bacterial pneumonia is not considered contagious.  How long does it last?   Before antibiotics were available, bacterial pneumonia was dangerous. With antibiotics it improves within 24 to 48 hours. On the other hand, viral pneumonia can continue for 2 to 4 weeks. Recovery from viral pneumonia is gradual but complete.  Most children with pneumonia can be cared for at home. Admission to the hospital for oxygen or intravenous fluids is required in less than 10% of cases. Most children admitted to the hospital are young infants or children whose lungs are extremely affected (as shown on an X-ray).  Recurrences of pneumonia are uncommon.  How is it treated?  AntibioticsChildren with bacterial pneumonia need an antibiotic prescribed by their provider.Only bacterial pneumonia is helped by antibiotics. Antibiotics will not kill viruses. However, your child's provider may start him on antibiotics because it is uncertain if pneumonia is caused by bacteria or a virus.   Medicines for feverUse acetaminophen (Tylenol) or ibuprofen (Advil) for fever (over 102 F, or 38.9 C). These medicines can also help chest pain.   Warm fluids for coughing spasmsCoughing spasms are often caused by sticky secretions in the  "back of the throat. Warm liquids usually relax the airway and loosen the secretions. Offer your child warm lemonade, apple juice, or herbal tea. Children over 1 year old can sip warm chicken broth. Encourage your child to drink a lot of fluids.In addition, breathing warm moist air helps to loosen up sticky mucus that may be choking your child. You can provide warm mist by placing a warm wet washcloth loosely over your child's nose and mouth. Or you can fill a humidifier with warm water and have your child breathe in the warm mist it produces. Avoid steam vaporizers because they can cause burns.Don't give cough suppressant medicines (such as those containing dextromethorphan) to children with pneumonia. Coughing helps protect the lungs by clearing out germs. In addition, OTC cough medicines are not approved by the FDA for children under 4 years of age.   HumidityDry air tends to make coughs worse. Use a humidifier in your child's bedroom if your home is dry.   No smokingTobacco smoke makes coughs worse and last longer. Don't let anyone smoke around your child. In fact, try not to let anybody smoke inside your home. Remind a teenager with pneumonia that if he or she smokes, the cough will last weeks longer.  When should I call my child's healthcare provider?  Call IMMEDIATELY if:  Breathing becomes more labored or difficult.   Your child starts acting very sick.  Call within 24 hours if:  The fever lasts over 48 hours after your child starts taking the antibiotic.   The cough lasts over 3 weeks.   You have other questions or concerns.        Published by [employer].  This content is reviewed periodically and is subject to change as new health information becomes available. The information is intended to inform and educate and is not a replacement for medical evaluation, advice, diagnosis or treatment by a healthcare professional.  Written by CHRYSTAL Tena MD, author of \"Your Child's Health,\" [employer].    2011 " [facility] and/or its affiliates. All rights reserved.

## 2020-01-06 ENCOUNTER — TELEPHONE (OUTPATIENT)
Dept: FAMILY MEDICINE | Facility: CLINIC | Age: 65
End: 2020-01-06

## 2020-01-06 DIAGNOSIS — J18.9 PNEUMONIA OF RIGHT MIDDLE LOBE DUE TO INFECTIOUS ORGANISM: Primary | ICD-10-CM

## 2020-01-06 DIAGNOSIS — Z87.01 HISTORY OF BACTERIAL PNEUMONIA: Primary | ICD-10-CM

## 2020-01-06 RX ORDER — AZITHROMYCIN 250 MG/1
TABLET, FILM COATED ORAL
Qty: 6 TABLET | Refills: 0 | Status: SHIPPED | OUTPATIENT
Start: 2020-01-06 | End: 2020-12-07

## 2020-01-06 NOTE — TELEPHONE ENCOUNTER
Edwina and her  has pneumonia they sow Leisa Show on Saturday she give to both of them antibiotics so he  trow away her by accident and they are  taking same antibiotics (her ) since Saturday and there are out of medication as today. She called to speak with a nurse.

## 2020-01-06 NOTE — TELEPHONE ENCOUNTER
Edwina Parsons is calling back regarding need for new medication. Due to  accidentally throwing away Edwina Heredia.

## 2020-01-15 ENCOUNTER — TRANSFERRED RECORDS (OUTPATIENT)
Dept: HEALTH INFORMATION MANAGEMENT | Facility: CLINIC | Age: 65
End: 2020-01-15

## 2020-03-15 ENCOUNTER — HEALTH MAINTENANCE LETTER (OUTPATIENT)
Age: 65
End: 2020-03-15

## 2020-11-25 ENCOUNTER — VIRTUAL VISIT (OUTPATIENT)
Dept: FAMILY MEDICINE | Facility: OTHER | Age: 65
End: 2020-11-25
Payer: COMMERCIAL

## 2020-11-25 DIAGNOSIS — Z20.822 SUSPECTED COVID-19 VIRUS INFECTION: Primary | ICD-10-CM

## 2020-11-25 PROCEDURE — 99421 OL DIG E/M SVC 5-10 MIN: CPT | Performed by: PHYSICIAN ASSISTANT

## 2020-11-26 NOTE — PROGRESS NOTES
"Date: 2020 19:05:20  Clinician: Cailin Pinto  Clinician NPI: [id number]  Patient: Edwina Zhou  Patient : 1955  Patient Address: [address] [place] [id number]  Patient Phone: [phone]  Visit Protocol: URI  Patient Summary:  Edwina Parsons is a 65 year old ( : 1955 ) female who initiated a [facility] Visit for COVID-19 (Coronavirus) evaluation and screening. When asked the question \"Please sign me up to receive news, health information and promotions from [facility].\", Edwina Parsons responded \"Yes\".    When asked when her symptoms started, Edwina Parsons reported that she does not have any symptoms.   She denies taking antibiotic medication in the past month and having recent facial or sinus surgery in the past 60 days.    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 In the past 14 days, Edwina Parsons has worked or volunteered at a long term care facility. 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   Edwina Parsons has had a close contact with a laboratory-confirmed COVID-19 patient in the last 14 days. She was not exposed at her work. Date Edwina Parsons was exposed to the laboratory-confirmed COVID-19 patient: 2020   Additional information about contact with COVID-19 (Coronavirus) patient as reported by the patient (free text): I spent 1 hour with my mom, who had a test 20 that came back positive. We touched hands as well. We were in her room. I also cleaned up a messy toilet, then washed my hands thoroughly   Edwina Parsons is not living in the same household with the COVID-19 positive patient. She was in an enclosed space for greater than 15 minutes with the COVID-19 patient.   During the encounter, only she was " wearing a mask.   Since December 2019, Edwina Parsons has not been tested for COVID-19 and has had upper respiratory infection (URI) or influenza-like illness.      Date(s) of previous URI or influenza-like illness (free-text): Dec. 27- Jan. 7th     Symptoms Edwina Parsons experienced during previous URI or influenza-like illness as reported by the patient (free-text): Fever, congestion, cough, extreme fatigue, loss of taste, trouble breathing - diagnosed as Pneumonia        Pertinent medical history  She has been told by her provider to avoid NSAIDs.   Edwina Parsons does not get yeast infections when she takes antibiotics.   Edwina Parsons does not have diabetes. She denies having immunosuppressive conditions (e.g., chemotherapy, HIV, organ transplant, long-term use of steroids or other immunosuppressive medications, splenectomy). She does not have severe COPD and congestive heart failure. She does not have asthma.   Edwina Parsons needs a return to work/school note.   Weight: 140 lbs   Edwina Parsons does not smoke or use smokeless tobacco.   Weight: 140 lbs    MEDICATIONS: Loratadine-D oral, ALLERGIES: Statins-Hmg-Coa Reductase Inhibitors  Clinician Response:  Dear Edwina Parsons,   Based on your exposure to COVID-19 (coronavirus), we would like to test you for this virus.  1. Please call [phone] to schedule your visit. Explain that you were referred by [facility] to have a COVID-19 test. Be ready to share your [facility] visit ID number.  * If you need to schedule in [facility] please call [phone] or for [employer] employees please call [phone].   * If you need to schedule in the [place] area please call [phone]. [employer] employees call [phone].   The following will serve as your written order for this COVID Test, ordered by me, for the indication of suspected COVID [Z20.828]: The test will be ordered in [facility], our electronic health record, after you are scheduled. It will show as ordered and authorized by Rick Anna MD.  Order:  COVID-19 (coronavirus) PCR for ASYMPTOMATIC EXPOSURE testing from [facility].   If you know you have had close contact with someone who tested positive, you should be quarantined for 14 days after this exposure. You should stay in quarantine for the14 days even if the covid test is negative, the optimal time to test after exposure is 5-7 days from the exposure  Quarantine means   What should I do?  For safety, it's very important to follow these rules. Do this for 14 days after the date you were last exposed to the virus..  Stay home and away from others. Don't go to school or anywhere else. Generally quarantine means staying home from work but there are some exceptions to this. Please contact your workplace.   No hugging, kissing or shaking hands.  Don't let anyone visit.  Cover your mouth and nose with a mask, tissue or washcloth to avoid spreading germs.  Wash your hands and face often. Use soap and water.  What are the symptoms of COVID-19?  The most common symptoms are cough, fever and trouble breathing. Less common symptoms include headache, body aches, fatigue (feeling very tired), chills, sore throat, stuffy or runny nose, diarrhea (loose poop), loss of taste or smell, belly pain, and nausea or vomiting (feeling sick to your stomach or throwing up).  After 14 days, if you have still don't have symptoms, you likely don't have this virus.  If you develop symptoms, follow these guidelines.  If you're normally healthy: Please start another [facility] visit to report your symptoms. Go to [email or web address].  If you have a serious health problem (like cancer, heart failure, an organ transplant or kidney disease): Call your specialty clinic. Let them know that you might have COVID-19.  2. When it's time for your COVID test:  Stay at least 6 feet away from others. (If someone will drive you to your test, stay in the backseat, as far away from the  as you can.)  Cover your mouth and nose with a mask, tissue or washcloth.  Go  straight to the testing site. Don't make any stops on the way there or back.  Please note  Caregivers in these groups are at risk for severe illness due to COVID-19:  o People 65 years and older  o People who live in a nursing home or long-term care facility  o People with chronic disease (lung, heart, cancer, diabetes, kidney, liver, immunologic)  o People who have a weakened immune system, including those who:  Are in cancer treatment  Take medicine that weakens the immune system, such as corticosteroids  Had a bone marrow or organ transplant  Have an immune deficiency  Have poorly controlled HIV or AIDS  Are obese (body mass index of 40 or higher)  Smoke regularly  Where can I get more information?   [facility] [place] -- About COVID-19: [email or web address]  CDC -- What to Do If You're Sick: www.cdc.gov/coronavirus/2019-ncov/about/steps-when-sick.html  [facility] -- Ending Home Isolation: www.cdc.gov/coronavirus/2019-ncov/hcp/disposition-in-home-patients.html  [facility] -- Caring for Someone: www.cdc.gov/coronavirus/2019-ncov/if-you-are-sick/care-for-someone.html  [facility] -- Interim Guidance for Hospital Discharge to Home: www.health.[facility].[place].us/diseases/coronavirus/hcp/hospdischarge.pdf  [facility] clinical trials (COVID-19 research studies): clinicalaffairs.[facility]-clinical-trials  Below are the COVID-19 hotlines at the [place] [facility] ([facility]). Interpreters are available.  For health questions: Call 633-984-6451 or 1-885.968.8942 (7 a.m. to 7 p.m.)  For questions about schools and childcare: Call 444-487-5589 or 1-725.293.2750 (7 a.m. to 7 p.m.)    Diagnosis: Contact with and (suspected) exposure to other viral communicable diseases  Diagnosis ICD: Z20.828

## 2020-11-27 DIAGNOSIS — Z20.822 SUSPECTED COVID-19 VIRUS INFECTION: ICD-10-CM

## 2020-11-27 DIAGNOSIS — Z20.822 SUSPECTED 2019 NOVEL CORONAVIRUS INFECTION: Primary | ICD-10-CM

## 2020-11-27 PROCEDURE — U0003 INFECTIOUS AGENT DETECTION BY NUCLEIC ACID (DNA OR RNA); SEVERE ACUTE RESPIRATORY SYNDROME CORONAVIRUS 2 (SARS-COV-2) (CORONAVIRUS DISEASE [COVID-19]), AMPLIFIED PROBE TECHNIQUE, MAKING USE OF HIGH THROUGHPUT TECHNOLOGIES AS DESCRIBED BY CMS-2020-01-R: HCPCS | Performed by: PHYSICIAN ASSISTANT

## 2020-11-29 LAB
SARS-COV-2 RNA SPEC QL NAA+PROBE: NOT DETECTED
SPECIMEN SOURCE: NORMAL

## 2020-12-04 ENCOUNTER — TELEPHONE (OUTPATIENT)
Dept: FAMILY MEDICINE | Facility: CLINIC | Age: 65
End: 2020-12-04

## 2020-12-04 DIAGNOSIS — K20.0 EOSINOPHILIC ESOPHAGITIS: ICD-10-CM

## 2020-12-04 DIAGNOSIS — E55.9 VITAMIN D DEFICIENCY: ICD-10-CM

## 2020-12-04 DIAGNOSIS — E78.5 HYPERLIPIDEMIA WITH TARGET LDL LESS THAN 130: Primary | ICD-10-CM

## 2020-12-04 DIAGNOSIS — R53.82 CHRONIC FATIGUE: ICD-10-CM

## 2020-12-04 DIAGNOSIS — K21.00 GASTROESOPHAGEAL REFLUX DISEASE WITH ESOPHAGITIS WITHOUT HEMORRHAGE: ICD-10-CM

## 2020-12-09 DIAGNOSIS — E78.5 HYPERLIPIDEMIA WITH TARGET LDL LESS THAN 130: ICD-10-CM

## 2020-12-09 DIAGNOSIS — K21.00 GASTROESOPHAGEAL REFLUX DISEASE WITH ESOPHAGITIS WITHOUT HEMORRHAGE: ICD-10-CM

## 2020-12-09 DIAGNOSIS — R53.82 CHRONIC FATIGUE: ICD-10-CM

## 2020-12-09 DIAGNOSIS — E55.9 VITAMIN D DEFICIENCY: ICD-10-CM

## 2020-12-09 LAB
ALBUMIN SERPL-MCNC: 3.7 G/DL (ref 3.4–5)
ALP SERPL-CCNC: 69 U/L (ref 40–150)
ALT SERPL W P-5'-P-CCNC: 19 U/L (ref 0–50)
ANION GAP SERPL CALCULATED.3IONS-SCNC: 4 MMOL/L (ref 3–14)
AST SERPL W P-5'-P-CCNC: 18 U/L (ref 0–45)
BILIRUB SERPL-MCNC: 0.4 MG/DL (ref 0.2–1.3)
BUN SERPL-MCNC: 20 MG/DL (ref 7–30)
CALCIUM SERPL-MCNC: 9.2 MG/DL (ref 8.5–10.1)
CHLORIDE SERPL-SCNC: 105 MMOL/L (ref 94–109)
CHOLEST SERPL-MCNC: 302 MG/DL
CO2 SERPL-SCNC: 27 MMOL/L (ref 20–32)
CREAT SERPL-MCNC: 0.77 MG/DL (ref 0.52–1.04)
CREAT UR-MCNC: 115 MG/DL
ERYTHROCYTE [DISTWIDTH] IN BLOOD BY AUTOMATED COUNT: 11.8 % (ref 10–15)
GFR SERPL CREATININE-BSD FRML MDRD: 82 ML/MIN/{1.73_M2}
GLUCOSE SERPL-MCNC: 91 MG/DL (ref 70–99)
HCT VFR BLD AUTO: 41.1 % (ref 35–47)
HDLC SERPL-MCNC: 72 MG/DL
HGB BLD-MCNC: 13.6 G/DL (ref 11.7–15.7)
LDLC SERPL CALC-MCNC: 207 MG/DL
MAGNESIUM SERPL-MCNC: 2.4 MG/DL (ref 1.6–2.3)
MCH RBC QN AUTO: 31.1 PG (ref 26.5–33)
MCHC RBC AUTO-ENTMCNC: 33.1 G/DL (ref 31.5–36.5)
MCV RBC AUTO: 94 FL (ref 78–100)
MICROALBUMIN UR-MCNC: 26 MG/L
MICROALBUMIN/CREAT UR: 22.96 MG/G CR (ref 0–25)
NONHDLC SERPL-MCNC: 230 MG/DL
PLATELET # BLD AUTO: 263 10E9/L (ref 150–450)
POTASSIUM SERPL-SCNC: 4.3 MMOL/L (ref 3.4–5.3)
PROT SERPL-MCNC: 7.5 G/DL (ref 6.8–8.8)
RBC # BLD AUTO: 4.38 10E12/L (ref 3.8–5.2)
SODIUM SERPL-SCNC: 136 MMOL/L (ref 133–144)
T4 FREE SERPL-MCNC: 0.92 NG/DL (ref 0.76–1.46)
TRIGL SERPL-MCNC: 116 MG/DL
TSH SERPL DL<=0.005 MIU/L-ACNC: 6.02 MU/L (ref 0.4–4)
WBC # BLD AUTO: 6.1 10E9/L (ref 4–11)

## 2020-12-09 PROCEDURE — 85027 COMPLETE CBC AUTOMATED: CPT | Performed by: FAMILY MEDICINE

## 2020-12-09 PROCEDURE — 82043 UR ALBUMIN QUANTITATIVE: CPT | Performed by: FAMILY MEDICINE

## 2020-12-09 PROCEDURE — 84443 ASSAY THYROID STIM HORMONE: CPT | Performed by: FAMILY MEDICINE

## 2020-12-09 PROCEDURE — 83735 ASSAY OF MAGNESIUM: CPT | Performed by: FAMILY MEDICINE

## 2020-12-09 PROCEDURE — 82306 VITAMIN D 25 HYDROXY: CPT | Performed by: FAMILY MEDICINE

## 2020-12-09 PROCEDURE — 80061 LIPID PANEL: CPT | Performed by: FAMILY MEDICINE

## 2020-12-09 PROCEDURE — 80053 COMPREHEN METABOLIC PANEL: CPT | Performed by: FAMILY MEDICINE

## 2020-12-09 PROCEDURE — 84439 ASSAY OF FREE THYROXINE: CPT | Performed by: FAMILY MEDICINE

## 2020-12-09 PROCEDURE — 36415 COLL VENOUS BLD VENIPUNCTURE: CPT | Performed by: FAMILY MEDICINE

## 2020-12-10 LAB
DEPRECATED CALCIDIOL+CALCIFEROL SERPL-MC: <64 UG/L (ref 20–75)
VITAMIN D2 SERPL-MCNC: <5 UG/L
VITAMIN D3 SERPL-MCNC: 59 UG/L

## 2020-12-16 ENCOUNTER — OFFICE VISIT (OUTPATIENT)
Dept: FAMILY MEDICINE | Facility: CLINIC | Age: 65
End: 2020-12-16
Payer: COMMERCIAL

## 2020-12-16 VITALS
HEIGHT: 62 IN | DIASTOLIC BLOOD PRESSURE: 60 MMHG | BODY MASS INDEX: 25.21 KG/M2 | SYSTOLIC BLOOD PRESSURE: 100 MMHG | WEIGHT: 137 LBS | OXYGEN SATURATION: 99 % | TEMPERATURE: 98 F | HEART RATE: 92 BPM

## 2020-12-16 DIAGNOSIS — R13.19 ESOPHAGEAL DYSPHAGIA: ICD-10-CM

## 2020-12-16 DIAGNOSIS — E55.9 VITAMIN D DEFICIENCY: ICD-10-CM

## 2020-12-16 DIAGNOSIS — E03.8 SUBCLINICAL HYPOTHYROIDISM: ICD-10-CM

## 2020-12-16 DIAGNOSIS — E78.5 HYPERLIPIDEMIA WITH TARGET LDL LESS THAN 130: ICD-10-CM

## 2020-12-16 DIAGNOSIS — Z86.39 HISTORY OF OBESITY: ICD-10-CM

## 2020-12-16 DIAGNOSIS — Z12.31 VISIT FOR SCREENING MAMMOGRAM: ICD-10-CM

## 2020-12-16 DIAGNOSIS — D22.9 MULTIPLE PIGMENTED NEVI: ICD-10-CM

## 2020-12-16 DIAGNOSIS — Z00.01 ENCOUNTER FOR ROUTINE ADULT MEDICAL EXAM WITH ABNORMAL FINDINGS: Primary | ICD-10-CM

## 2020-12-16 DIAGNOSIS — Z12.4 SCREENING FOR MALIGNANT NEOPLASM OF CERVIX: ICD-10-CM

## 2020-12-16 DIAGNOSIS — R13.10 PILL DYSPHAGIA: ICD-10-CM

## 2020-12-16 PROCEDURE — 99396 PREV VISIT EST AGE 40-64: CPT | Mod: 25 | Performed by: FAMILY MEDICINE

## 2020-12-16 PROCEDURE — G0145 SCR C/V CYTO,THINLAYER,RESCR: HCPCS | Performed by: FAMILY MEDICINE

## 2020-12-16 PROCEDURE — 90682 RIV4 VACC RECOMBINANT DNA IM: CPT | Performed by: FAMILY MEDICINE

## 2020-12-16 PROCEDURE — 90471 IMMUNIZATION ADMIN: CPT | Performed by: FAMILY MEDICINE

## 2020-12-16 PROCEDURE — 87624 HPV HI-RISK TYP POOLED RSLT: CPT | Performed by: FAMILY MEDICINE

## 2020-12-16 PROCEDURE — 99213 OFFICE O/P EST LOW 20 MIN: CPT | Mod: 25 | Performed by: FAMILY MEDICINE

## 2020-12-16 RX ORDER — CHOLESTYRAMINE LIGHT 4 G/5.7G
4 POWDER, FOR SUSPENSION ORAL 2 TIMES DAILY WITH MEALS
Qty: 60 PACKET | Refills: 11 | Status: SHIPPED | OUTPATIENT
Start: 2020-12-16 | End: 2021-01-20

## 2020-12-16 RX ORDER — OMEGA-3/DHA/EPA/FISH OIL 100-150 MG
CAPSULE ORAL
COMMUNITY
Start: 2020-12-16

## 2020-12-16 SDOH — ECONOMIC STABILITY: INCOME INSECURITY: HOW HARD IS IT FOR YOU TO PAY FOR THE VERY BASICS LIKE FOOD, HOUSING, MEDICAL CARE, AND HEATING?: NOT ASKED

## 2020-12-16 SDOH — ECONOMIC STABILITY: TRANSPORTATION INSECURITY
IN THE PAST 12 MONTHS, HAS LACK OF TRANSPORTATION KEPT YOU FROM MEETINGS, WORK, OR FROM GETTING THINGS NEEDED FOR DAILY LIVING?: NOT ASKED

## 2020-12-16 SDOH — ECONOMIC STABILITY: TRANSPORTATION INSECURITY
IN THE PAST 12 MONTHS, HAS THE LACK OF TRANSPORTATION KEPT YOU FROM MEDICAL APPOINTMENTS OR FROM GETTING MEDICATIONS?: NOT ASKED

## 2020-12-16 SDOH — ECONOMIC STABILITY: FOOD INSECURITY: WITHIN THE PAST 12 MONTHS, THE FOOD YOU BOUGHT JUST DIDN'T LAST AND YOU DIDN'T HAVE MONEY TO GET MORE.: NOT ASKED

## 2020-12-16 SDOH — ECONOMIC STABILITY: FOOD INSECURITY: WITHIN THE PAST 12 MONTHS, YOU WORRIED THAT YOUR FOOD WOULD RUN OUT BEFORE YOU GOT MONEY TO BUY MORE.: NOT ASKED

## 2020-12-16 ASSESSMENT — MIFFLIN-ST. JEOR: SCORE: 1129.68
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
## 2020-12-16 NOTE — PATIENT INSTRUCTIONS
Preventive Health Recommendations  Female Ages 50 - 64    Yearly exam: See your health care provider every year in order to  o Review health changes.   o Discuss preventive care.    o Review your medicines if your doctor has prescribed any.      Get a Pap test every three years (unless you have an abnormal result and your provider advises testing more often).    If you get Pap tests with HPV test, you only need to test every 5 years, unless you have an abnormal result.     You do not need a Pap test if your uterus was removed (hysterectomy) and you have not had cancer.    You should be tested each year for STDs (sexually transmitted diseases) if you're at risk.     Have a mammogram every 1 to 2 years.    Have a colonoscopy at age 50, or have a yearly FIT test (stool test). These exams screen for colon cancer.      Have a cholesterol test every 5 years, or more often if advised.    Have a diabetes test (fasting glucose) every three years. If you are at risk for diabetes, you should have this test more often.     If you are at risk for osteoporosis (brittle bone disease), think about having a bone density scan (DEXA).    Shots: Get a flu shot each year. Get a tetanus shot every 10 years.    Nutrition:     Eat at least 5 servings of fruits and vegetables each day.    Eat whole-grain bread, whole-wheat pasta and brown rice instead of white grains and rice.    Get adequate Calcium and Vitamin D.     Lifestyle    Exercise at least 150 minutes a week (30 minutes a day, 5 days a week). This will help you control your weight and prevent disease.    Limit alcohol to one drink per day.    No smoking.     Wear sunscreen to prevent skin cancer.     See your dentist every six months for an exam and cleaning.    See your eye doctor every 1 to 2 years.    To help lower your cholesterol and triglycerides:      Exercise for 30-45 minutes 4 days/week and   Try odorless fish oil or flax seed oil capsules - 3000-4000mg by   mouth  daily, and try 2 or 3 of the below supplements:   an odor-free garlic supplement daily,  coenzyme q-10 @ 100mg by   mouth daily, red yeast rice extract 1200mg by mouth twice daily and   plant sterols such as Benecol : Three servings per day (1.5 g sitostanol   per 1 1/2 teaspoon [8 g] serving) or  Minute Maid  Heart Vickers  orange   juice or  Rice Dream Heart Vickers  beverages: Two 8 fluid ounce   servings daily.     Can also do a daily fiber therapy with citrucel or ashley fiber   (available at [facility]) to help reduce triglycerides and overall cholesterol.    Decreased to 1/2 or even 1/4 over the counter usual dose, if getting diarrhea/gas/bloating.    Or for  daily antioxidant/fiber therapy instead of citrucel or ashley fiber :   Can also   drink tobi fresca - 1 scoop of tobi seeds (one of natures superfoods -   available at [facility]) in 8 oz water with juice of 1/4-1/2 of a lime and    A little sweetener ( sugar) or Agave syrup or honey to taste. It makes   a slurry w/ the seeds. You chew the seeds a bit as you drink it .   Tastes a bit like a sweet -tart.      In addition for your general health, try  vitamin D 1000 IU daily,    folic acid 1mg daily and calcium citrate or carbonate 600mg by mouth   twice daily.  If you get constipation, try taking your calcium with a   magnesium supplement.  Also should be taking multivitamin daily such   as centrum - 1/2 tablet twice  daily or 1 flintstones vitamin twice daily.     I also recommend a yogurt with active cultures daily or a probiotic   supplement 1-2 capsules daily (depending on formulation) daily for   good digestive health and immune function.     Thank you so much or choosing [facility]  for your Health Care. It was a pleasure seeing you at your visit today! Please contact us with any questions or concerns you may have.                   Phuong Story MD                              To reach your [facility]  care team after hours call:   [phone]    PLEASE NOTE OUR HOURS HAVE CHANGED secondary to COVID-19 coronavirus pandemic, as we are trying to minimize patient exposure to the virus,  which is now widespread in the state.  These hours may change with very little notice.  We apologize for any inconvenience.       Our current clinic hours are:          Monday- Thursday   7:00am - 6:00pm  in person.      Friday  7:00am- 5:00pm                       Saturday and Sunday : Closed to in person and virtual visits        We have telephone and virtual visit times available between    7:00am - 6pm on Monday-Friday as well.                                                Phone:  [phone]      Our pharmacy hours:   Monday  9:00 am to 6:00 pm      Tuesday through Friday 9:00am to 5:00pm                        Saturday - 9:00 am to 12 noon       Sunday : Closed.              Phone:  [phone]    ###  Please note: at this time we are not accepting any walk-in visits. ###      There is also information available at our web site:  [email or web address]    If your provider ordered any lab tests and you do not receive the results within 10 business days, please call the clinic.    If you need a medication refill please contact your pharmacy.  Please allow 2 business days for your refill to be completed.    Our clinic offers telephone visits and e visits.  Please ask one of your team members to explain more.      Use [employer] (secure email communication and access to your chart) to send your primary care provider a message or make an appointment. Ask someone on your Team how to sign up for [employer].

## 2020-12-21 LAB
COPATH REPORT: NORMAL
PAP: NORMAL

## 2020-12-23 LAB
FINAL DIAGNOSIS: NORMAL
HPV HR 12 DNA CVX QL NAA+PROBE: NEGATIVE
HPV16 DNA SPEC QL NAA+PROBE: NEGATIVE
HPV18 DNA SPEC QL NAA+PROBE: NEGATIVE
SPECIMEN DESCRIPTION: NORMAL
SPECIMEN SOURCE CVX/VAG CYTO: NORMAL

## 2021-01-04 ENCOUNTER — VIRTUAL VISIT (OUTPATIENT)
Dept: CARDIOLOGY | Facility: CLINIC | Age: 66
End: 2021-01-04
Attending: FAMILY MEDICINE
Payer: COMMERCIAL

## 2021-01-04 DIAGNOSIS — E78.5 HYPERLIPIDEMIA WITH TARGET LDL LESS THAN 130: ICD-10-CM

## 2021-01-04 PROCEDURE — 99203 OFFICE O/P NEW LOW 30 MIN: CPT | Mod: 95 | Performed by: INTERNAL MEDICINE

## 2021-01-04 RX ORDER — EZETIMIBE 10 MG/1
10 TABLET ORAL DAILY
Qty: 90 TABLET | Refills: 3 | Status: SHIPPED | OUTPATIENT
Start: 2021-01-04 | End: 2021-12-17

## 2021-01-04 NOTE — LETTER
1/4/2021    Phuong Story MD  [address] [facility] [address]    RE: Edwina Zhou       Dear Colleague,    I had the pleasure of seeing Edwina Zhou in the [facility].    Edwina Zhou is a 65 year old female who is being evaluated via a billable video visit.      How would you like to obtain your AVS? MyChart  If the video visit is dropped, the invitation should be resent by: Send to e-mail at: [email or web address]  Will anyone else be joining your video visit? No      Patient Unable to assess bp  Pulse: 67  Weight: 140lb  Review Of Systems  Skin: NEGATIVE  Eyes:Ears/Nose/Throat: NEGATIVE  Respiratory: NEGATIVE  Cardiovascular:NEGATIVE  Gastrointestinal: NEGATIVE  Genitourinary:NEGATIVE   Musculoskeletal: NEGATIVE  Neurologic: NEGATIVE  Psychiatric: NEGATIVE  Hematologic/Lymphatic/Immunologic: NEGATIVE  Endocrine:  NEGATIVE        Video-Visit Details    Type of service:  Video Visit    Video End Time:11:30 AM    Originating Location (pt. Location): Home    Distant Location (provider location):  [facility]     Platform used for Video Visit: Ykone     This clinic visit was performed via telephone/video due to COVID-19 restrictions.    Today, I had the pleasure of connecting with Edwina Zhou for a follow-up visit.  She is a pleasant 65 year old patient with a Pmh of hypercholesterolemia seen on video conference_her primary care physician for hypercholesterolemia.  The patient has history of hypercholesterolemia diagnosed over a decade ago.  She has been tried on several statins but has not been able to tolerate any.  She develops myalgias and joint pains.  She was recently ordered for cholestyramine which she has not started taking.  She argues that the cost of $180 per month is prohibitive and she would hate to start it, get decent results and had to quit due to cost.   inquires if there is an alternate medication that would be  prescribed.    She denies chest pain, shortness of breath, orthopnea, PND, exertional dyspnea, presyncope or syncope.  The most recent fasting lipid profile shows LDL greater than 200, total cholesterol 302, HDL 72 and triglyceride 116 milligrams per deciliter.    Assessment:  1.  Paroxysmal SVT Zio patch in 2018  2.  Hyperlipidemia    I discussed various options with the patient.  I told her that it is reasonable to start cholestyramine today.  I would also add Zetia.  We talked about good Rx the patient is going to check to see if it can lower cost of medications for her.  We can check cholesterol in 3 months and if it continues to be very high we will try adding low-dose Crestor (5 mg).  We may also consider adding fenofibrate.  Her triglycerides in the past have been elevated although on the most recent labs they are within normal limit.  If she develops myalgias/joint pains on Crestor and/or a combination of cholestyramine and Zetia are unable to lower the cholesterol target levels we may consider performing a coronary calcium score/angiogram to document buildup in which case we may be able to get her on a PCSK9 inhibitor.  We also talked about Mediterranean-style diet and regular exercise and continuing coenzyme 10.  She would like to be followed up in the primary care clinic and would like to see me if it comes down to prescribing a PCSK9 inhibitor.    Thank you for allowing me to participate in the care of Edwina Zhou    This note was completed in part using Dragon voice recognition software. Although reviewed after completion, some word and grammatical errors may occur.    Son Hedrick MD  Cardiology    No orders of the defined types were placed in this encounter.      No orders of the defined types were placed in this encounter.      There are no discontinued medications.    Encounter Diagnosis   Name Primary?     Hyperlipidemia with target LDL less than 130        CURRENT MEDICATIONS:  Current  Outpatient Medications   Medication Sig Dispense Refill     Cholecalciferol (VITAMIN D3) 2000 UNITS CAPS 1 capsule daily - takes only intermittently (Patient taking differently: 1 capsule daily)  0     co-enzyme Q-10 100 MG CAPS capsule Take by mouth daily       guaiFENesin-codeine (GUAIFENESIN AC) 100-10 MG/5ML syrup Take 5 mLs by mouth every 4 hours as needed for congestion 118 mL 0     hydrocortisone 2.5 % cream Apply very small amount to eyelids up to twice daily (Patient taking differently: as needed Apply very small amount to eyelids up to twice daily) 20 g 3     ibuprofen (ADVIL/MOTRIN) 600 MG tablet Take 1 tablet (600 mg) by mouth every 6 hours as needed for pain (mild) 30 tablet 0     LORATADINE 10 MG OR TABS ONE DAILY 3 MONTHS 1 YEAR     Melatonin ER 5 MG TBCR        Omega-3 Fatty Acids (FISH OIL) 875 MG CHEW 1 tab chewable twice daily       UNABLE TO FIND MEDICATION NAME: Tumeric 1000mg       albuterol (PROAIR HFA/PROVENTIL HFA/VENTOLIN HFA) 108 (90 Base) MCG/ACT inhaler Inhale 2 puffs into the lungs every 6 hours (Patient not taking: Reported on 1/4/2021) 1 Inhaler 0     cholestyramine light (QUESTRAN) 4 GM packet Take 1 packet (4 g) by mouth 2 times daily (with meals) (Patient not taking: Reported on 1/4/2021) 60 packet 11       ALLERGIES     Allergies   Allergen Reactions     Atorvastatin      Muscle cramps with lipitor      Cephalexin      Started acyclovir and keflex together.  Unsure if drug reaction or viral exanthem.        PAST MEDICAL HISTORY:  Past Medical History:   Diagnosis Date     Allergic rhinitis, cause unspecified      Arthritis      Dysphagia     had esophagram 12/07 - tertiary contractions of esoph with some retention, EGD showed gastritis, duodenitis and esophagitis ,pt didn't start a PPI       Fatigue      Fatigue      Gastroesophageal reflux disease      H/O YISEL I     s/p culpo with bx x 2 and cryotherapy - ok for paps q3yrs starting 2012     Helicobacter pylori (H. pylori)  "infection in 2009     Hyperlipidemia LDL goal < 130 02/04/2005    lipitor \"attacked my muscles\" (Hx fibromyalgia); [id number] failed to lower lipids enough&=myalgias also , lovastatin 20mg = myalgias      Lyme disease 8/20/2010     Myalgia and myositis, unspecified     fibromyalgia - doing an otc human growth  hormone      OBESITY NOS- hx of      lost 30+ pounds since 2002 breast reduction      Obesity- worsening fibromyalgia, back pain, pcos  12/1/2009     Polycystic ovaries      Postmenopausal since age 52      Shingles 08/20/2010       PAST SURGICAL HISTORY:  Past Surgical History:   Procedure Laterality Date     BREAST SURGERY  15+years ago    reduction     COLONOSCOPY  due again     COLONOSCOPY Left 8/2/2019    Procedure: COLONOSCOPY (FV);  Surgeon: Sung Shanks MD;  Location: [facility]     COMBINED CYSTOSCOPY, INSERT STENT URETER(S) Bilateral 2/13/2018    Procedure: COMBINED CYSTOSCOPY, INSERT STENT URETER(S);  cystoscopy with bilateral ureteral stent placement;  Surgeon: Stephen Muniz MD;  Location: [facility]     COSMETIC SURGERY       CYSTOSCOPY N/A 2/13/2018    Procedure: CYSTOSCOPY;   CYSTOSCOPY;  Surgeon: North Cai MD;  Location: [facility]     DAVINCI HYSTERECTOMY SUPRACERVICAL, SACROCOLPOPEXY, COMBINED N/A 2/13/2018    Procedure: COMBINED DAVINCI HYSTERECTOMY SUPRACERVICAL, SACROCOLPOPEXY;  Cystoscopy, bilateral ureteral stent placement,  Robotic supracervical hysterectomy, bilateral salpingo-oophorectomy and sacrocolpopexy,  cystoscopy, removal of ureteral stents;  Surgeon: North Cai MD;  Location: [facility]     EXAM UNDER ANESTHESIA PELVIC N/A 2/13/2018    Procedure: EXAM UNDER ANESTHESIA PELVIC;;  Surgeon: North Cai MD;  Location: [facility]     HYSTERECTOMY, PAP STILL INDICATED       KNEE SURGERY Right     Meniscal repair     ORTHOPEDIC SURGERY  ?    meniscus     SURGICAL HISTORY OF -   1990's    COLPO WITH BX X2 + CRYO     SURGICAL HISTORY OF -       WISDOM TEETH-SUBSEQ MINDY " FXR=ORIF     SURGICAL HISTORY OF -   2002    breast reduction - [facility]      TONSILLECTOMY  at age 32       FAMILY HISTORY:  Family History   Adopted: Yes   Problem Relation Age of Onset     Family History Negative Other         pt is adopted - no knowledge of family hx      Unknown/Adopted Mother      Unknown/Adopted Father        SOCIAL HISTORY:  Social History     Socioeconomic History     Marital status:      Spouse name: Wade ( Ken)      Number of children: 2     Years of education: 18     Highest education level: None   Occupational History     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     Comment: [employer]   Social Needs     Financial resource strain: None     Food insecurity     Worry: None     Inability: None     Transportation needs     Medical: None     Non-medical: None   Tobacco Use     Smoking status: Never Smoker     Smokeless tobacco: Never Used   Substance and Sexual Activity     Alcohol use: Yes     Comment: 0-2 glasses of wine weekly     Drug use: No     Sexual activity: Yes     Partners: Male     Birth control/protection: Post-menopausal     Comment:  to Niraj Zhou    Lifestyle     Physical activity     Days per week: None     Minutes per session: None     Stress: None   Relationships     Social connections     Talks on phone: None     Gets together: None     Attends Latter-day service: None     Active member of club or organization: None     Attends meetings of clubs or organizations: None     Relationship status: None     Intimate partner violence     Fear of current or ex partner: None     Emotionally abused: None     Physically abused: None     Forced sexual activity: None   Other Topics Concern      Service No     Blood Transfusions No     Caffeine Concern Yes     Comment: diet soda and tea occasionally      Exercise Yes     Comment: doing [place] Weight loss - horse back riding, [place] track, hiking      Seat Belt Yes     Comment: always       Self-Exams Yes     Comment: SBE encouraged monthly      Parent/sibling w/ CABG, MI or angioplasty before 65F 55M? No   Social History Narrative    ** Merged History Encounter **         calcium - nothing right now, increase 1000-1200mg po daily     flex sig/colonoscopy - pt is adopted - do screening     sun precautions - discussed     mammogram - yearly     Td booster - can't recall - will give today 2/4/05    pneumovax - at age 62     DEXA - when postmenopausal     stool hemoccults - every year after age 40    ASA- consider 81mg asa daily     mulvitamin - encouraged    taking herbals - human growth hormone sl spray - for fibromyalgia, bifida bacteria.     Menses: getting more irregular the last 2 years.          General Appearance: No distress, normal body habitus, upright.  ENT/Mouth:  Normal head shape, no evidence of injury or laceration.  EYES: No scleral icterus, normal conjunctivae  Neck:  No evidence of thyromegaly  Chest/Lungs: No audible wheezing. Non labored breathing. No cough.  Cardiovascular: No evidence of elevated jugular venous pressure.   Skin: No xanthelasma, normal skin collar. No evidence of facial lacerations.  Neurologic: No focal neurological defect. Normal speech.  Psychiatric: Alert and oriented x3      Thank you for allowing me to participate in the care of your patient.    Sincerely,     Son Hedrick MD     [facility]

## 2021-01-04 NOTE — PROGRESS NOTES
Edwina Zhou is a 65 year old female who is being evaluated via a billable video visit.      How would you like to obtain your AVS? MyChart  If the video visit is dropped, the invitation should be resent by: Send to e-mail at: [email or web address]  Will anyone else be joining your video visit? No      Patient Unable to assess bp  Pulse: 67  Weight: 140lb  Review Of Systems  Skin: NEGATIVE  Eyes:Ears/Nose/Throat: NEGATIVE  Respiratory: NEGATIVE  Cardiovascular:NEGATIVE  Gastrointestinal: NEGATIVE  Genitourinary:NEGATIVE   Musculoskeletal: NEGATIVE  Neurologic: NEGATIVE  Psychiatric: NEGATIVE  Hematologic/Lymphatic/Immunologic: NEGATIVE  Endocrine:  NEGATIVE        Video-Visit Details    Type of service:  Video Visit    Video End Time:11:30 AM    Originating Location (pt. Location): Home    Distant Location (provider location):  [facility]     Platform used for Video Visit: SteveWell     This clinic visit was performed via telephone/video due to COVID-19 restrictions.    Today, I had the pleasure of connecting with Edwina Zhou for a follow-up visit.  She is a pleasant 65 year old patient with a Pmh of hypercholesterolemia seen on video conference_her primary care physician for hypercholesterolemia.  The patient has history of hypercholesterolemia diagnosed over a decade ago.  She has been tried on several statins but has not been able to tolerate any.  She develops myalgias and joint pains.  She was recently ordered for cholestyramine which she has not started taking.  She argues that the cost of $180 per month is prohibitive and she would hate to start it, get decent results and had to quit due to cost.  Sh inquires if there is an alternate medication that would be prescribed.    She denies chest pain, shortness of breath, orthopnea, PND, exertional dyspnea, presyncope or syncope.  The most recent fasting lipid profile shows LDL greater than 200, total cholesterol 302, HDL 72 and  triglyceride 116 milligrams per deciliter.    Assessment:  1.  Paroxysmal SVT Zio patch in 2018  2.  Hyperlipidemia    I discussed various options with the patient.  I told her that it is reasonable to start cholestyramine today.  I would also add Zetia.  We talked about good Rx the patient is going to check to see if it can lower cost of medications for her.  We can check cholesterol in 3 months and if it continues to be very high we will try adding low-dose Crestor (5 mg).  We may also consider adding fenofibrate.  Her triglycerides in the past have been elevated although on the most recent labs they are within normal limit.  If she develops myalgias/joint pains on Crestor and/or a combination of cholestyramine and Zetia are unable to lower the cholesterol target levels we may consider performing a coronary calcium score/angiogram to document buildup in which case we may be able to get her on a PCSK9 inhibitor.  We also talked about Mediterranean-style diet and regular exercise and continuing coenzyme 10.  She would like to be followed up in the primary care clinic and would like to see me if it comes down to prescribing a PCSK9 inhibitor.    Thank you for allowing me to participate in the care of Edwina Zhou    This note was completed in part using Dragon voice recognition software. Although reviewed after completion, some word and grammatical errors may occur.    Son Hedrick MD  Cardiology    No orders of the defined types were placed in this encounter.      No orders of the defined types were placed in this encounter.      There are no discontinued medications.    Encounter Diagnosis   Name Primary?     Hyperlipidemia with target LDL less than 130        CURRENT MEDICATIONS:  Current Outpatient Medications   Medication Sig Dispense Refill     Cholecalciferol (VITAMIN D3) 2000 UNITS CAPS 1 capsule daily - takes only intermittently (Patient taking differently: 1 capsule daily)  0     co-enzyme Q-10 100 MG  "CAPS capsule Take by mouth daily       guaiFENesin-codeine (GUAIFENESIN AC) 100-10 MG/5ML syrup Take 5 mLs by mouth every 4 hours as needed for congestion 118 mL 0     hydrocortisone 2.5 % cream Apply very small amount to eyelids up to twice daily (Patient taking differently: as needed Apply very small amount to eyelids up to twice daily) 20 g 3     ibuprofen (ADVIL/MOTRIN) 600 MG tablet Take 1 tablet (600 mg) by mouth every 6 hours as needed for pain (mild) 30 tablet 0     LORATADINE 10 MG OR TABS ONE DAILY 3 MONTHS 1 YEAR     Melatonin ER 5 MG TBCR        Omega-3 Fatty Acids (FISH OIL) 875 MG CHEW 1 tab chewable twice daily       UNABLE TO FIND MEDICATION NAME: Tumeric 1000mg       albuterol (PROAIR HFA/PROVENTIL HFA/VENTOLIN HFA) 108 (90 Base) MCG/ACT inhaler Inhale 2 puffs into the lungs every 6 hours (Patient not taking: Reported on 1/4/2021) 1 Inhaler 0     cholestyramine light (QUESTRAN) 4 GM packet Take 1 packet (4 g) by mouth 2 times daily (with meals) (Patient not taking: Reported on 1/4/2021) 60 packet 11       ALLERGIES     Allergies   Allergen Reactions     Atorvastatin      Muscle cramps with lipitor      Cephalexin      Started acyclovir and keflex together.  Unsure if drug reaction or viral exanthem.        PAST MEDICAL HISTORY:  Past Medical History:   Diagnosis Date     Allergic rhinitis, cause unspecified      Arthritis      Dysphagia     had esophagram 12/07 - tertiary contractions of esoph with some retention, EGD showed gastritis, duodenitis and esophagitis ,pt didn't start a PPI       Fatigue      Fatigue      Gastroesophageal reflux disease      H/O YISEL I     s/p culpo with bx x 2 and cryotherapy - ok for paps q3yrs starting 2012     Helicobacter pylori (H. pylori) infection in 2009     Hyperlipidemia LDL goal < 130 02/04/2005    lipitor \"attacked my muscles\" (Hx fibromyalgia); [id number] failed to lower lipids enough&=myalgias also , lovastatin 20mg = myalgias      Lyme disease " 8/20/2010     Myalgia and myositis, unspecified     fibromyalgia - doing an otc human growth  hormone      OBESITY NOS- hx of      lost 30+ pounds since 2002 breast reduction      Obesity- worsening fibromyalgia, back pain, pcos  12/1/2009     Polycystic ovaries      Postmenopausal since age 52      Shingles 08/20/2010       PAST SURGICAL HISTORY:  Past Surgical History:   Procedure Laterality Date     BREAST SURGERY  15+years ago    reduction     COLONOSCOPY  due again     COLONOSCOPY Left 8/2/2019    Procedure: COLONOSCOPY (FV);  Surgeon: Sung Shanks MD;  Location: [facility]     COMBINED CYSTOSCOPY, INSERT STENT URETER(S) Bilateral 2/13/2018    Procedure: COMBINED CYSTOSCOPY, INSERT STENT URETER(S);  cystoscopy with bilateral ureteral stent placement;  Surgeon: Stephen Muniz MD;  Location: [facility]     COSMETIC SURGERY       CYSTOSCOPY N/A 2/13/2018    Procedure: CYSTOSCOPY;   CYSTOSCOPY;  Surgeon: North Cai MD;  Location: [facility]     DAVINCI HYSTERECTOMY SUPRACERVICAL, SACROCOLPOPEXY, COMBINED N/A 2/13/2018    Procedure: COMBINED DAVINCI HYSTERECTOMY SUPRACERVICAL, SACROCOLPOPEXY;  Cystoscopy, bilateral ureteral stent placement,  Robotic supracervical hysterectomy, bilateral salpingo-oophorectomy and sacrocolpopexy,  cystoscopy, removal of ureteral stents;  Surgeon: North Cai MD;  Location: [facility]     EXAM UNDER ANESTHESIA PELVIC N/A 2/13/2018    Procedure: EXAM UNDER ANESTHESIA PELVIC;;  Surgeon: North Cai MD;  Location: [facility]     HYSTERECTOMY, PAP STILL INDICATED       KNEE SURGERY Right     Meniscal repair     ORTHOPEDIC SURGERY  ?    meniscus     SURGICAL HISTORY OF -   1990's    COLPO WITH BX X2 + CRYO     SURGICAL HISTORY OF -       WISDOM TEETH-SUBSEQ MINDY FXR=ORIF     SURGICAL HISTORY OF -   2002    breast reduction - [facility]      TONSILLECTOMY  at age 32       FAMILY HISTORY:  Family History   Adopted: Yes   Problem Relation Age of Onset     Family History  Negative Other         pt is adopted - no knowledge of family hx      Unknown/Adopted Mother      Unknown/Adopted Father        SOCIAL HISTORY:  Social History     Socioeconomic History     Marital status:      Spouse name: Mauro Healy)      Number of children: 2     Years of education: 18     Highest education level: None   Occupational History     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     Comment: [employer]   Social Needs     Financial resource strain: None     Food insecurity     Worry: None     Inability: None     Transportation needs     Medical: None     Non-medical: None   Tobacco Use     Smoking status: Never Smoker     Smokeless tobacco: Never Used   Substance and Sexual Activity     Alcohol use: Yes     Comment: 0-2 glasses of wine weekly     Drug use: No     Sexual activity: Yes     Partners: Male     Birth control/protection: Post-menopausal     Comment:  to Niraj Zhou    Lifestyle     Physical activity     Days per week: None     Minutes per session: None     Stress: None   Relationships     Social connections     Talks on phone: None     Gets together: None     Attends Orthodoxy service: None     Active member of club or organization: None     Attends meetings of clubs or organizations: None     Relationship status: None     Intimate partner violence     Fear of current or ex partner: None     Emotionally abused: None     Physically abused: None     Forced sexual activity: None   Other Topics Concern      Service No     Blood Transfusions No     Caffeine Concern Yes     Comment: diet soda and tea occasionally      Exercise Yes     Comment: doing [place] Weight loss - horse back riding, [place] track, hiking      Seat Belt Yes     Comment: always      Self-Exams Yes     Comment: SBE encouraged monthly      Parent/sibling w/ CABG, MI or angioplasty before 65F 55M? No   Social History Narrative    ** Merged History Encounter **         calcium - nothing right now,  increase 1000-1200mg po daily     flex sig/colonoscopy - pt is adopted - do screening     sun precautions - discussed     mammogram - yearly     Td booster - can't recall - will give today 2/4/05    pneumovax - at age 62     DEXA - when postmenopausal     stool hemoccults - every year after age 40    ASA- consider 81mg asa daily     mulvitamin - encouraged    taking herbals - human growth hormone sl spray - for fibromyalgia, bifida bacteria.     Menses: getting more irregular the last 2 years.          General Appearance: No distress, normal body habitus, upright.  ENT/Mouth:  Normal head shape, no evidence of injury or laceration.  EYES: No scleral icterus, normal conjunctivae  Neck:  No evidence of thyromegaly  Chest/Lungs: No audible wheezing. Non labored breathing. No cough.  Cardiovascular: No evidence of elevated jugular venous pressure.   Skin: No xanthelasma, normal skin collar. No evidence of facial lacerations.  Neurologic: No focal neurological defect. Normal speech.  Psychiatric: Alert and oriented x3

## 2021-01-18 DIAGNOSIS — E78.5 HYPERLIPIDEMIA WITH TARGET LDL LESS THAN 130: ICD-10-CM

## 2021-01-20 RX ORDER — CHOLESTYRAMINE LIGHT 4 G/5.7G
4 POWDER, FOR SUSPENSION ORAL 2 TIMES DAILY WITH MEALS
Qty: 60 PACKET | Refills: 11 | Status: SHIPPED | OUTPATIENT
Start: 2021-01-20 | End: 2021-01-28

## 2021-01-20 NOTE — TELEPHONE ENCOUNTER
Routing refill request to provider for review/approval because:  Drug not on the [facility] refill protocol         Negra Verdugo RN  [facility]

## 2021-01-25 ENCOUNTER — TELEPHONE (OUTPATIENT)
Dept: FAMILY MEDICINE | Facility: CLINIC | Age: 66
End: 2021-01-25

## 2021-01-25 DIAGNOSIS — E78.5 HYPERLIPIDEMIA WITH TARGET LDL LESS THAN 130: Primary | ICD-10-CM

## 2021-01-25 NOTE — TELEPHONE ENCOUNTER
Alternative is being requested from the patient fo thhe canisters of prevalite as the packets are too expensive.     Please rewrite and resubmit for the canisters.     Rx is to take 4 grams by mouth 2 times daily with meals.       Lazara Davenport

## 2021-01-25 NOTE — TELEPHONE ENCOUNTER
Reason for Call:  Form, our goal is to have forms completed with 72 hours, however, some forms may require a visit or additional information.    Type of letter, form or note:  RX Alternative     Who is the form from?: [facility] (if other please explain)    Where did the form come from: form was faxed in    What clinic location was the form placed at?: [place]    Where the form was placed: [facility] Box/Folder    What number is listed as a contact on the form?: [phone]       Additional comments: Alternative RX requested  Call taken on 1/25/2021 at 3:29 PM by Oksana Bhakta

## 2021-01-26 ENCOUNTER — HOSPITAL ENCOUNTER (OUTPATIENT)
Dept: MAMMOGRAPHY | Facility: CLINIC | Age: 66
Discharge: HOME | End: 2021-01-26
Attending: FAMILY MEDICINE | Admitting: FAMILY MEDICINE
Payer: COMMERCIAL

## 2021-01-26 DIAGNOSIS — Z12.31 VISIT FOR SCREENING MAMMOGRAM: ICD-10-CM

## 2021-01-26 PROCEDURE — 77067 SCR MAMMO BI INCL CAD: CPT

## 2021-01-27 ENCOUNTER — MYC MEDICAL ADVICE (OUTPATIENT)
Dept: FAMILY MEDICINE | Facility: CLINIC | Age: 66
End: 2021-01-27

## 2021-01-27 NOTE — TELEPHONE ENCOUNTER
Please see my chart message below     Please review and advise     Thank you     Cortney Call RN, BSN  [place] [facility]

## 2021-01-28 RX ORDER — CHOLESTYRAMINE LIGHT 4 G/5.7G
4 POWDER, FOR SUSPENSION ORAL 2 TIMES DAILY WITH MEALS
Qty: 231 G | Refills: 11 | Status: SHIPPED | OUTPATIENT
Start: 2021-01-28 | End: 2021-01-29

## 2021-01-29 ENCOUNTER — TELEPHONE (OUTPATIENT)
Dept: FAMILY MEDICINE | Facility: CLINIC | Age: 66
End: 2021-01-29

## 2021-01-29 DIAGNOSIS — E78.5 HYPERLIPIDEMIA WITH TARGET LDL LESS THAN 130: ICD-10-CM

## 2021-01-29 RX ORDER — CHOLESTYRAMINE LIGHT 4 G/5.7G
POWDER, FOR SUSPENSION ORAL
Qty: 231 G | Refills: 11 | Status: SHIPPED | OUTPATIENT
Start: 2021-01-29 | End: 2021-12-17

## 2021-01-29 NOTE — TELEPHONE ENCOUNTER
Spoke to Provider. Updated Script sent to pharmacy. Called and communicated same with patient.    Rama MAHER

## 2021-01-29 NOTE — TELEPHONE ENCOUNTER
Patient called and inquired about RX. Explained provider completed request on 1/28/2021. Patient stated will check with pharmacy and call us back if there are further questions.    Rama MAHER

## 2021-01-29 NOTE — TELEPHONE ENCOUNTER
Patient called again and stated script sent to pharmacy was still not correct and was written for the packets still and not the canister. Spoke to RN Miranda and he will get updated script sent. Routing to RN for follow up.    Rama MAHER

## 2021-04-28 ENCOUNTER — MYC MEDICAL ADVICE (OUTPATIENT)
Dept: FAMILY MEDICINE | Facility: CLINIC | Age: 66
End: 2021-04-28

## 2021-04-28 DIAGNOSIS — E78.5 HYPERLIPIDEMIA WITH TARGET LDL LESS THAN 130: ICD-10-CM

## 2021-04-28 NOTE — TELEPHONE ENCOUNTER
Please see my chart message below     cholestyramine light (PREVALITE) 4 GM powder 231 g 11 1/29/2021  No   Sig: Take 4 g twice daily   Sent to pharmacy as: Cholestyramine Light 4 GM/DOSE Oral Powder (PREVALITE)   Class: E-Prescribe   Notes to Pharmacy: Please dispense cannister   Order: [id number]     LOV : 12/16/2020      Lab Results   Component Value Date    CHOL 302 12/09/2020    CHOL 286 06/29/2019     Lab Results   Component Value Date    HDL 72 12/09/2020    HDL 45 06/29/2019     Lab Results   Component Value Date     12/09/2020     06/29/2019     Lab Results   Component Value Date    TRIG 116 12/09/2020    TRIG 287 06/29/2019     Lab Results   Component Value Date    CHOLHDLRATIO 4.6 08/24/2015    CHOLHDLRATIO 3.9 07/30/2014       Please review and advise     Thank you     Cortney Call RN, BSN  [facility]

## 2021-05-03 NOTE — PATIENT INSTRUCTIONS
You can reach your [facility] Team any time of the day by calling [phone]. This number will put you in touch with the 24 hour nurse line if the clinic is closed.    To contact your OB/GYN Station Coordinator/Surgery Scheduler please call [phone]. This is a direct number for your care team between 8 a.m. and 4 p.m. Monday through Friday.    [facility] is open for your convenience:  Monday through Friday 8 a.m. to 6 p.m.  Closed weekends and all major holidays.    
53 yo F no sig pm hx dysfunctional uterine bleeding and anemia, pw AMS. +mild unsteady gait, neuro exam otherwise within normal limits. Tox labs, and CT head given unwitnessed fall. Possible ETOH or other substance intoxication. Reassess for disposition.

## 2021-05-10 DIAGNOSIS — E78.5 HYPERLIPIDEMIA WITH TARGET LDL LESS THAN 130: ICD-10-CM

## 2021-05-10 DIAGNOSIS — E03.8 SUBCLINICAL HYPOTHYROIDISM: ICD-10-CM

## 2021-05-10 LAB
ALT SERPL W P-5'-P-CCNC: 25 U/L (ref 0–50)
AST SERPL W P-5'-P-CCNC: 22 U/L (ref 0–45)
CHOLEST SERPL-MCNC: 285 MG/DL
HDLC SERPL-MCNC: 67 MG/DL
LDLC SERPL CALC-MCNC: 195 MG/DL
NONHDLC SERPL-MCNC: 218 MG/DL
T3 SERPL-MCNC: 127 NG/DL (ref 60–181)
T4 FREE SERPL-MCNC: 0.77 NG/DL (ref 0.76–1.46)
TRIGL SERPL-MCNC: 113 MG/DL
TSH SERPL DL<=0.005 MIU/L-ACNC: 4.03 MU/L (ref 0.4–4)

## 2021-05-10 PROCEDURE — 84443 ASSAY THYROID STIM HORMONE: CPT | Performed by: FAMILY MEDICINE

## 2021-05-10 PROCEDURE — 36415 COLL VENOUS BLD VENIPUNCTURE: CPT | Performed by: FAMILY MEDICINE

## 2021-05-10 PROCEDURE — 84480 ASSAY TRIIODOTHYRONINE (T3): CPT | Performed by: FAMILY MEDICINE

## 2021-05-10 PROCEDURE — 84460 ALANINE AMINO (ALT) (SGPT): CPT | Performed by: FAMILY MEDICINE

## 2021-05-10 PROCEDURE — 84439 ASSAY OF FREE THYROXINE: CPT | Performed by: FAMILY MEDICINE

## 2021-05-10 PROCEDURE — 84450 TRANSFERASE (AST) (SGOT): CPT | Performed by: FAMILY MEDICINE

## 2021-05-10 PROCEDURE — 80061 LIPID PANEL: CPT | Performed by: FAMILY MEDICINE

## 2021-06-11 DIAGNOSIS — E78.5 HYPERLIPIDEMIA WITH TARGET LDL LESS THAN 130: Primary | ICD-10-CM

## 2021-06-11 DIAGNOSIS — E03.8 SUBCLINICAL HYPOTHYROIDISM: ICD-10-CM

## 2021-06-11 DIAGNOSIS — E55.9 VITAMIN D DEFICIENCY: ICD-10-CM

## 2021-06-11 DIAGNOSIS — H01.001 BLEPHARITIS OF RIGHT UPPER EYELID, UNSPECIFIED TYPE: ICD-10-CM

## 2021-06-11 RX ORDER — ACETAMINOPHEN 160 MG
1 TABLET,DISINTEGRATING ORAL DAILY
Refills: 0 | COMMUNITY
Start: 2021-06-11

## 2021-06-11 RX ORDER — HYDROCORTISONE 2.5 %
CREAM (GRAM) TOPICAL
Qty: 20 G | Refills: 3 | COMMUNITY
Start: 2021-06-11 | End: 2023-02-13

## 2021-06-11 RX ORDER — ROSUVASTATIN CALCIUM 5 MG/1
5 TABLET, COATED ORAL
Qty: 30 TABLET | Refills: 3 | Status: SHIPPED | OUTPATIENT
Start: 2021-06-14 | End: 2021-12-17

## 2021-06-12 ENCOUNTER — MYC MEDICAL ADVICE (OUTPATIENT)
Dept: FAMILY MEDICINE | Facility: CLINIC | Age: 66
End: 2021-06-12

## 2021-06-12 DIAGNOSIS — E03.8 SUBCLINICAL HYPOTHYROIDISM: Primary | ICD-10-CM

## 2021-06-15 NOTE — TELEPHONE ENCOUNTER
Please see my chart message below     Please review and advise     Thank you     Cortney Call RN, BSN  [facility]

## 2021-06-27 RX ORDER — LEVOTHYROXINE SODIUM 25 UG/1
25 TABLET ORAL DAILY
Qty: 90 TABLET | Refills: 1 | Status: SHIPPED | OUTPATIENT
Start: 2021-06-27 | End: 2021-12-07

## 2021-08-26 ENCOUNTER — OFFICE VISIT (OUTPATIENT)
Dept: FAMILY MEDICINE | Facility: CLINIC | Age: 66
End: 2021-08-26
Payer: COMMERCIAL

## 2021-08-26 VITALS
WEIGHT: 166 LBS | DIASTOLIC BLOOD PRESSURE: 70 MMHG | TEMPERATURE: 97.6 F | HEART RATE: 74 BPM | BODY MASS INDEX: 30.36 KG/M2 | SYSTOLIC BLOOD PRESSURE: 112 MMHG | OXYGEN SATURATION: 98 %

## 2021-08-26 DIAGNOSIS — R53.83 FATIGUE, UNSPECIFIED TYPE: Primary | ICD-10-CM

## 2021-08-26 DIAGNOSIS — J30.9 ALLERGIC RHINITIS, UNSPECIFIED SEASONALITY, UNSPECIFIED TRIGGER: ICD-10-CM

## 2021-08-26 DIAGNOSIS — M54.2 NECK PAIN: ICD-10-CM

## 2021-08-26 DIAGNOSIS — R51.9 NONINTRACTABLE HEADACHE, UNSPECIFIED CHRONICITY PATTERN, UNSPECIFIED HEADACHE TYPE: ICD-10-CM

## 2021-08-26 LAB
BASOPHILS # BLD AUTO: 0 10E3/UL (ref 0–0.2)
BASOPHILS NFR BLD AUTO: 0 %
EOSINOPHIL # BLD AUTO: 0.4 10E3/UL (ref 0–0.7)
EOSINOPHIL NFR BLD AUTO: 6 %
ERYTHROCYTE [DISTWIDTH] IN BLOOD BY AUTOMATED COUNT: 12.1 % (ref 10–15)
HCT VFR BLD AUTO: 41.7 % (ref 35–47)
HGB BLD-MCNC: 13.9 G/DL (ref 11.7–15.7)
IMM GRANULOCYTES # BLD: 0 10E3/UL
IMM GRANULOCYTES NFR BLD: 0 %
LYMPHOCYTES # BLD AUTO: 2.6 10E3/UL (ref 0.8–5.3)
LYMPHOCYTES NFR BLD AUTO: 38 %
MCH RBC QN AUTO: 30.6 PG (ref 26.5–33)
MCHC RBC AUTO-ENTMCNC: 33.3 G/DL (ref 31.5–36.5)
MCV RBC AUTO: 92 FL (ref 78–100)
MONOCYTES # BLD AUTO: 0.5 10E3/UL (ref 0–1.3)
MONOCYTES NFR BLD AUTO: 7 %
NEUTROPHILS # BLD AUTO: 3.3 10E3/UL (ref 1.6–8.3)
NEUTROPHILS NFR BLD AUTO: 49 %
PLATELET # BLD AUTO: 298 10E3/UL (ref 150–450)
RBC # BLD AUTO: 4.54 10E6/UL (ref 3.8–5.2)
TSH SERPL DL<=0.005 MIU/L-ACNC: 1.56 MU/L (ref 0.4–4)
WBC # BLD AUTO: 6.8 10E3/UL (ref 4–11)

## 2021-08-26 PROCEDURE — 86618 LYME DISEASE ANTIBODY: CPT | Performed by: FAMILY MEDICINE

## 2021-08-26 PROCEDURE — 84443 ASSAY THYROID STIM HORMONE: CPT | Performed by: FAMILY MEDICINE

## 2021-08-26 PROCEDURE — 36415 COLL VENOUS BLD VENIPUNCTURE: CPT | Performed by: FAMILY MEDICINE

## 2021-08-26 PROCEDURE — 86665 EPSTEIN-BARR CAPSID VCA: CPT | Performed by: FAMILY MEDICINE

## 2021-08-26 PROCEDURE — 85025 COMPLETE CBC W/AUTO DIFF WBC: CPT | Performed by: FAMILY MEDICINE

## 2021-08-26 PROCEDURE — 99214 OFFICE O/P EST MOD 30 MIN: CPT | Performed by: FAMILY MEDICINE

## 2021-08-26 RX ORDER — LORATADINE 10 MG/1
10 TABLET ORAL 2 TIMES DAILY
COMMUNITY
Start: 2021-08-26

## 2021-08-26 NOTE — PROGRESS NOTES
"  Assessment & Plan   Fatigue, unspecified type  Fatigue over the last few months of unclear cause.  Sleep has been fair.  Mood has fluctuated and has good support.  Has been taking thyroid medicine and due for recheck, she would like some labs done:  - TSH with free T4 reflex  - EBV Capsid Antibody IgM  - **CBC with platelets differential FUTURE 2mo  - Lyme Disease Hansa with reflex to WB Serum      Neck and upper back pain left greater than the right  Nonintractable headache, unspecified chronicity pattern, unspecified headache type  Neck exercises and mouth massage might be helpful, heat can help as well as ice it that feels better.  Exercise sheet given.  Sleep is important as well and good pillow.  Extension exercises discussed.    Allergic rhinitis, unspecified seasonality, unspecified trigger  Allergy-like symptoms and will refill:  - loratadine (CLARITIN) 10 MG tablet; Refill: 1 YEAR      BMI:   Estimated body mass index is 30.36 kg/m  as calculated from the following:    Height as of 12/16/20: 1.575 m (5' 2\").    Weight as of this encounter: 75.3 kg (166 lb).   Weight management plan: Discussed healthy diet and exercise guidelines      Return in about 4 months (around 12/26/2021) for wellness exam with fasting labs with OMA Ryan MD      [address] [address]  [employer]   Office: [phone]       Subjective   Edwina Parsons is a 66 year old who presents for the following health issues     HPI     Acute Illness  Acute illness concerns: Fatigue, headaches, neck soreness  Onset/Duration: Gradually worsening for 1-3 months   Symptoms:  Fever: no  Chills/Sweats: no  Headache (location?): YES  Sinus Pressure: YES  Conjunctivitis:  YES  Ear Pain: YES: bilateral  Rhinorrhea: YES  Congestion: YES  Sore Throat: YES  Cough: no  Wheeze: no  Decreased Appetite: no  Nausea: no  Vomiting: no  Diarrhea: YES - related to being very " exhausted  Dysuria/Freq.: no  Dysuria or Hematuria: no  Fatigue/Achiness: YES  Sick/Strep Exposure: no  Therapies tried and outcome: None    Increased stress      Review of Systems   Constitutional, HEENT, cardiovascular, pulmonary, gi and gu systems are negative, except as otherwise noted.      Objective    /70 (BP Location: Right arm, Patient Position: Sitting, Cuff Size: Adult Regular)   Pulse 74   Temp 97.6  F (36.4  C) (Tympanic)   Wt 75.3 kg (166 lb)   LMP 06/03/2010   SpO2 98%   BMI 30.36 kg/m    Body mass index is 30.36 kg/m .  Physical Exam   /70 (BP Location: Right arm, Patient Position: Sitting, Cuff Size: Adult Regular)   Pulse 74   Temp 97.6  F (36.4  C) (Tympanic)   Wt 75.3 kg (166 lb)   LMP 06/03/2010   SpO2 98%   BMI 30.36 kg/m    GENERAL: no apparent distress  EYES: Conjunctiva are not injected, no discharge.  EARS: Left TM -no erythema, no effusion,  not bulged.               Right TM -no erythema, no effusion,  not bulged.  NOSE: no discharge, no sinus tenderness  THROAT: no erythema, no exudate, no lesions  NECK: supple, no adenopathy.  CARDIAC: regular rate and rhythm, no murmur  RESP: clear, no wheezing, no rales, no rhonchi  ABD: soft, no distension, no tenderness  SKIN: No rashes    pending

## 2021-08-26 NOTE — PATIENT INSTRUCTIONS
Patient Education   Preventive Health Recommendations  See your health care provider every year to    Review health changes.     Discuss preventive care.      Review your medicines if your doctor has prescribed any.    Talk with your health care provider about whether you should have a test to screen for prostate cancer (PSA).    Every 3 years, have a diabetes test (fasting glucose). If you are at risk for diabetes, you should have this test more often.    Every 5 years, have a cholesterol test. Have this test more often if you are at risk for high cholesterol or heart disease.     Every 10 years, have a colonoscopy. Or, have a yearly FIT test (stool test). These exams will check for colon cancer.    Talk to with your health care provider about screening for Abdominal Aortic Aneurysm if you have a family history of AAA or have a history of smoking.    Shots:     Get a flu shot each year.     Get a tetanus shot every 10 years.     Talk to your doctor about your pneumonia vaccines. There are now two you should receive - Pneumovax (PPSV 23) and Prevnar (PCV 13).    Talk to your pharmacist about a shingles vaccine.     Talk to your doctor about the hepatitis B vaccine.    Nutrition:     Eat at least 5 servings of fruits and vegetables each day.     Eat whole-grain bread, whole-wheat pasta and brown rice instead of white grains and rice.     Get adequate Calcium and Vitamin D.     Lifestyle    Exercise for at least 150 minutes a week (30 minutes a day, 5 days a week). This will help you control your weight and prevent disease.     Limit alcohol to one drink per day.     No smoking.     Wear sunscreen to prevent skin cancer.     See your dentist every six months for an exam and cleaning.     See your eye doctor every 1 to 2 years to screen for conditions such as glaucoma, macular degeneration and cataracts.    Personalized Prevention Plan  You are due for the preventive services outlined below.  Your care team is  available to assist you in scheduling these services.  If you have already completed any of these items, please share that information with your care team to update in your medical record.  Health Maintenance   Topic Date Due     PHQ-2  01/01/2021     INFLUENZA VACCINE (1) 09/01/2021     PREVENTIVE CARE VISIT  12/16/2021     ANNUAL REVIEW OF HM ORDERS  12/16/2021     MAMMO SCREENING  01/26/2022     HPV TEST  12/16/2023     PAP  12/16/2023     ADVANCE CARE PLANNING  01/10/2026     LIPID  05/10/2026     DTAP/TDAP/TD IMMUNIZATION (5 - Td or Tdap) 11/05/2028     COLORECTAL CANCER SCREENING  08/02/2029     HEPATITIS C SCREENING  Completed     HIV SCREENING  Completed     ZOSTER IMMUNIZATION  Completed     COVID-19 Vaccine  Completed     Pneumococcal Vaccine: Pediatrics (0 to 5 Years) and At-Risk Patients (6 to 64 Years)  Aged Out     IPV IMMUNIZATION  Aged Out     MENINGITIS IMMUNIZATION  Aged Out     HEPATITIS B IMMUNIZATION  Aged Out       Exercise for a Healthier Heart     Exercise with a friend. When activity is fun, you're more likely to stick with it.   You may wonder how you can improve the health of your heart. If you re thinking about exercise, you re on the right track. You don t need to become an athlete. But you do need a certain amount of brisk exercise to help strengthen your heart. If you have been diagnosed with a heart condition, your healthcare provider may advise exercise to help stabilize your condition. To help make exercise a habit, choose safe, fun activities.   Before you start  Check with your healthcare provider before starting an exercise program. This is especially important if you have not been active for a while. It's also important if you have a long-term (chronic) health problem such as heart disease, diabetes, or obesity. Or if you are at high risk for having these problems.   Why exercise?  Exercising regularly offers many healthy rewards. It can help you do all of the  following:    Improve your blood cholesterol level to help prevent further heart trouble    Lower your blood pressure to help prevent a stroke or heart attack    Control diabetes, or reduce your risk of getting this disease    Improve your heart and lung function    Reach and stay at a healthy weight    Make your muscles stronger so you can stay active    Prevent falls and fractures by slowing the loss of bone mass (osteoporosis)    Manage stress better    Reduce your blood pressure    Improve your sense of self and your body image  Exercise tips      Ease into your routine. Set small goals. Then build on them. If you are not sure what your activity level should be, talk with your healthcare provider first before starting an exercise routine.    Exercise on most days. Aim for a total of 150 minutes (2 hours and 30 minutes) or more of moderate-intensity aerobic activity each week. Or 75 minutes (1 hour and 15 minutes) or more of vigorous-intensity aerobic activity each week. Or try for a combination of both. Moderate activity means that you breathe heavier and your heart rate increases but you can still talk. Think about doing 40 minutes of moderate exercise, 3 to 4 times a week. For best results, activity should last for about 40 minutes to lower blood pressure and cholesterol. It's OK to work up to the 40-minute period over time. Examples of moderate-intensity activity are walking 1 mile in 15 minutes. Or doing 30 to 45 minutes of yard work.    Step up your daily activity level.  Along with your exercise program, try being more active the whole day. Walk instead of drive. Or park further away so that you take more steps each day. Do more household tasks or yard work. You may not be able to meet the advised mount of physical activity. But doing some moderate- or vigorous-intensity aerobic activity can help reduce your risk for heart disease. Your healthcare provider can help you figure out what is best for  you.    Choose 1 or more activities you enjoy.  Walking is one of the easiest things you can do. You can also try swimming, riding a bike, dancing, or taking an exercise class.    When to call your healthcare provider  Call your healthcare provider if you have any of these:     Chest pain or feel dizzy or lightheaded    Burning, tightness, pressure, or heaviness in your chest, neck, shoulders, back, or arms    Abnormal shortness of breath    More joint or muscle pain    A very fast or irregular heartbeat (palpitations)  [employer] last reviewed this educational content on 7/1/2019 2000-2020 [employer]. [address] [place] [id number]. All rights reserved. This information is not intended as a substitute for professional medical care. Always follow your healthcare professional's instructions.          Understanding [employer] MyPlate  The USDA (U.S. Department of Agriculture) has guidelines to help you make healthy food choices. These are called MyPlate. MyPlate shows the food groups that make up healthy meals using the image of a place setting. Before you eat, think about the healthiest choices for what to put onto your plate or into your cup or bowl. To learn more about building a healthy plate, visit www.choosemyplate.gov.    The food groups    Fruits. Any fruit or 100% fruit juice counts as part of the Fruit Group. Fruits may be fresh, canned, frozen, or dried, and may be whole, cut-up, or pureed. Make half your plate fruits and vegetables.    Vegetables. Any vegetable or 100% vegetable juice counts as a member of the Vegetable Group. Vegetables may be fresh, frozen, canned, or dried. They can be served raw or cooked and may be whole, cut-up, or mashed. Make half your plate fruits and vegetables.    Grains. All foods made from grains are part of the Grains Group. These include wheat, rice, oats, cornmeal, and barley such as bread, pasta, oatmeal, cereal, tortillas, and grits. Grains should be  "no more than a quarter of your plate. At least half of your grains should be whole grains.    Protein. This group includes meat, poultry, seafood, beans and peas, eggs, processed soy products (like tofu), nuts (including nut butters), and seeds. Make protein choices no more than a quarter of your plate. Meat and poultry choices should be lean or low fat.    Dairy. All fluid milk products and foods made from milk that contain calcium, like yogurt and cheese, are part of the Dairy Group. (Foods that have little calcium, such as cream, butter, and cream cheese, are not part of the group.) Most dairy choices should be low-fat or fat-free.    Oils. These are fats that are liquid at room temperature. They include canola, corn, olive, soybean, and sunflower oil. Foods that are mainly oil include mayonnaise, certain salad dressings, and soft margarines. You should have only 5 to 7 teaspoons of oils a day. You probably already get this much from the food you eat.  [employer] last reviewed this educational content on 8/1/2017 2000-2020 [employer]. [address] [place] [address]. All rights reserved. This information is not intended as a substitute for professional medical care. Always follow your healthcare professional's instructions.           Patient Education     Tips for Sleep Hygiene  \"Sleep hygiene\" means having good sleep habits.Follow these tips to sleep better at night:     Get on a schedule. Go to bed and get up at about the same time every day.    Listen to your body. Only try to sleep when you actually feel tired or sleepy.    Be patient. If you haven't been able to get to sleep after about 30 minutes or more, get up and do something calming or boring until you feel sleepy. Then return to bed and try again.    Don't have caffeine (coffee, tea, cola drinks, chocolate and some medicines), alcohol or nicotine (cigarettes). These can make it harder for you to fall asleep and stay " "asleep.    Use your bed for sleeping only. That means no TV, computer or homework in bed, especially during the evening and before bedtime.    Don't nap during the day. If you must nap, make sure it is for less than 20 minutes.    Create sleep rituals that remind your body it is time to sleep. Examples include breathing exercises, stretching or reading a book.    Avoid all electronic media (smart phone, computer, tablet) within 2 hours of bed time. The \"blue light\" in these devices activates the part of the brain that keeps you awake.    Dim the lights at night.    Get early morning sources of light (walk in the sunshine) to help set sleep patterns at night.    Try a bath or shower before bed. Having a warm bath 1 to 2 hours before bedtime can help you feel sleepy. Hot baths can make you alert, so be mindful of the temperature.    Don't watch the clock. Checking the clock during the night can wake you up. It can also lead to negative thoughts such as, \"I will never fall asleep,\" which can increase anxiety and sleeplessness.    Use a sleep diary. Track your sleep schedule to know your sleep patterns and to see where you can improve.    Get regular exercise every day. Try not to do heavy exercise in the 4 hours before bedtime.    Eat a healthy, balanced diet.    Try eating a light, healthy snack before bed, but avoid eating a heavy meal.    Create the right sleeping area. A cool, dark, quiet room is best. If needed, try earplugs, fans and blackout curtains.    Keep your daytime routine the same even if you have a bad night sleep. Avoiding activities the next day can make it harder to sleep.  For informational purposes only. Not to replace the advice of your health care provider.   Copyright   2013 [employer]. All rights reserved. [employer] 976344 - 01/16.  For informational purposes only. Not to replace the advice of your health care provider.  Copyright   2018 [employer]. All rights " reserved.

## 2021-08-27 LAB — B BURGDOR IGG+IGM SER QL: 0.27

## 2021-08-30 LAB
EBV VCA IGM SER IA-ACNC: <10 U/ML
EBV VCA IGM SER IA-ACNC: NORMAL

## 2021-08-30 NOTE — RESULT ENCOUNTER NOTE
Dear Edwnia Parsons,    Here is a summary of your recent test results:  -Normal red blood cell (hgb) levels, normal white blood cell count and normal platelet levels.  -TSH (thyroid stimulating hormone) level is normal which indicates appropriate thyroid replacement dosing.  ADVISE: continuing same replacement dose and rechecking this in 12 months.  -No signs of mono virus (EBV) infection.  -Lymes test is normal.           Thank you very much for trusting me and [facility].     Have a peaceful day.    Healthy regards,  Andrey Ryan MD

## 2021-10-24 ENCOUNTER — HEALTH MAINTENANCE LETTER (OUTPATIENT)
Age: 66
End: 2021-10-24

## 2021-12-17 ENCOUNTER — OFFICE VISIT (OUTPATIENT)
Dept: FAMILY MEDICINE | Facility: CLINIC | Age: 66
End: 2021-12-17
Payer: COMMERCIAL

## 2021-12-17 VITALS
WEIGHT: 170 LBS | TEMPERATURE: 96.8 F | SYSTOLIC BLOOD PRESSURE: 124 MMHG | HEART RATE: 74 BPM | BODY MASS INDEX: 32.1 KG/M2 | OXYGEN SATURATION: 97 % | HEIGHT: 61 IN | DIASTOLIC BLOOD PRESSURE: 70 MMHG

## 2021-12-17 DIAGNOSIS — E03.9 HYPOTHYROIDISM, UNSPECIFIED TYPE: ICD-10-CM

## 2021-12-17 DIAGNOSIS — Z00.00 ROUTINE GENERAL MEDICAL EXAMINATION AT A HEALTH CARE FACILITY: Primary | ICD-10-CM

## 2021-12-17 DIAGNOSIS — N39.46 MIXED INCONTINENCE URGE AND STRESS (MALE)(FEMALE): ICD-10-CM

## 2021-12-17 DIAGNOSIS — E55.9 VITAMIN D DEFICIENCY: ICD-10-CM

## 2021-12-17 DIAGNOSIS — R13.10 PILL DYSPHAGIA: ICD-10-CM

## 2021-12-17 DIAGNOSIS — K21.00 GASTROESOPHAGEAL REFLUX DISEASE WITH ESOPHAGITIS WITHOUT HEMORRHAGE: ICD-10-CM

## 2021-12-17 DIAGNOSIS — Z12.31 VISIT FOR SCREENING MAMMOGRAM: ICD-10-CM

## 2021-12-17 DIAGNOSIS — E78.5 HYPERLIPIDEMIA WITH TARGET LDL LESS THAN 130: ICD-10-CM

## 2021-12-17 DIAGNOSIS — E03.8 SUBCLINICAL HYPOTHYROIDISM: ICD-10-CM

## 2021-12-17 DIAGNOSIS — F50.819 BINGE EATING DISORDER: ICD-10-CM

## 2021-12-17 LAB
ALBUMIN SERPL-MCNC: 3.4 G/DL (ref 3.4–5)
ALP SERPL-CCNC: 67 U/L (ref 40–150)
ALT SERPL W P-5'-P-CCNC: 25 U/L (ref 0–50)
ANION GAP SERPL CALCULATED.3IONS-SCNC: 5 MMOL/L (ref 3–14)
AST SERPL W P-5'-P-CCNC: 18 U/L (ref 0–45)
BILIRUB SERPL-MCNC: 0.4 MG/DL (ref 0.2–1.3)
BUN SERPL-MCNC: 12 MG/DL (ref 7–30)
CALCIUM SERPL-MCNC: 9.1 MG/DL (ref 8.5–10.1)
CHLORIDE BLD-SCNC: 107 MMOL/L (ref 94–109)
CO2 SERPL-SCNC: 27 MMOL/L (ref 20–32)
CREAT SERPL-MCNC: 0.66 MG/DL (ref 0.52–1.04)
ERYTHROCYTE [DISTWIDTH] IN BLOOD BY AUTOMATED COUNT: 12 % (ref 10–15)
GFR SERPL CREATININE-BSD FRML MDRD: >90 ML/MIN/1.73M2
GLUCOSE BLD-MCNC: 89 MG/DL (ref 70–99)
HCT VFR BLD AUTO: 43.3 % (ref 35–47)
HGB BLD-MCNC: 14 G/DL (ref 11.7–15.7)
MCH RBC QN AUTO: 29.9 PG (ref 26.5–33)
MCHC RBC AUTO-ENTMCNC: 32.3 G/DL (ref 31.5–36.5)
MCV RBC AUTO: 92 FL (ref 78–100)
PLATELET # BLD AUTO: 256 10E3/UL (ref 150–450)
POTASSIUM BLD-SCNC: 4 MMOL/L (ref 3.4–5.3)
PROT SERPL-MCNC: 7.4 G/DL (ref 6.8–8.8)
RBC # BLD AUTO: 4.69 10E6/UL (ref 3.8–5.2)
SODIUM SERPL-SCNC: 139 MMOL/L (ref 133–144)
T3 SERPL-MCNC: 134 NG/DL (ref 60–181)
T4 FREE SERPL-MCNC: 1.01 NG/DL (ref 0.76–1.46)
TSH SERPL DL<=0.005 MIU/L-ACNC: 0.27 MU/L (ref 0.4–4)
WBC # BLD AUTO: 6.4 10E3/UL (ref 4–11)

## 2021-12-17 PROCEDURE — 84439 ASSAY OF FREE THYROXINE: CPT | Performed by: FAMILY MEDICINE

## 2021-12-17 PROCEDURE — 99214 OFFICE O/P EST MOD 30 MIN: CPT | Mod: 25 | Performed by: FAMILY MEDICINE

## 2021-12-17 PROCEDURE — 82043 UR ALBUMIN QUANTITATIVE: CPT | Performed by: FAMILY MEDICINE

## 2021-12-17 PROCEDURE — 82306 VITAMIN D 25 HYDROXY: CPT | Performed by: FAMILY MEDICINE

## 2021-12-17 PROCEDURE — 84443 ASSAY THYROID STIM HORMONE: CPT | Performed by: FAMILY MEDICINE

## 2021-12-17 PROCEDURE — 36415 COLL VENOUS BLD VENIPUNCTURE: CPT | Performed by: FAMILY MEDICINE

## 2021-12-17 PROCEDURE — 80061 LIPID PANEL: CPT | Performed by: FAMILY MEDICINE

## 2021-12-17 PROCEDURE — 99396 PREV VISIT EST AGE 40-64: CPT | Performed by: FAMILY MEDICINE

## 2021-12-17 PROCEDURE — 80053 COMPREHEN METABOLIC PANEL: CPT | Performed by: FAMILY MEDICINE

## 2021-12-17 PROCEDURE — 84480 ASSAY TRIIODOTHYRONINE (T3): CPT | Performed by: FAMILY MEDICINE

## 2021-12-17 PROCEDURE — 85027 COMPLETE CBC AUTOMATED: CPT | Performed by: FAMILY MEDICINE

## 2021-12-17 RX ORDER — ROSUVASTATIN CALCIUM 5 MG/1
5 TABLET, COATED ORAL
Qty: 90 TABLET | Refills: 3 | Status: SHIPPED | OUTPATIENT
Start: 2021-12-20 | End: 2022-03-01

## 2021-12-17 ASSESSMENT — ENCOUNTER SYMPTOMS
ARTHRALGIAS: 1
EYE PAIN: 0
ABDOMINAL PAIN: 0
WEAKNESS: 0
COUGH: 0
NERVOUS/ANXIOUS: 0
DIZZINESS: 0
PARESTHESIAS: 0
FREQUENCY: 0
SHORTNESS OF BREATH: 0
JOINT SWELLING: 0
MYALGIAS: 0
NAUSEA: 0
DIARRHEA: 0
HEARTBURN: 1
CHILLS: 0
FEVER: 0
HEMATOCHEZIA: 0
BREAST MASS: 0
CONSTIPATION: 0
HEADACHES: 1
DYSURIA: 0
HEMATURIA: 0
SORE THROAT: 0
PALPITATIONS: 0

## 2021-12-17 ASSESSMENT — MIFFLIN-ST. JEOR: SCORE: 1258.49
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
## 2021-12-17 NOTE — PATIENT INSTRUCTIONS
[facility]  [address] [place] [id number]  Office: [phone]   Fax:    [phone]       Return in about 6 months (around 6/17/2022) for cholesterol/lipids, as a lab only visit, then follow up with Dr. HARRIS in person in 1 year . will check thyroid labs in 6 months as well.       Preventive Health Recommendations  Female Ages 50 - 64    Yearly exam: See your health care provider every year in order to  o Review health changes.   o Discuss preventive care.    o Review your medicines if your doctor has prescribed any.      Get a Pap test every three years (unless you have an abnormal result and your provider advises testing more often).    If you get Pap tests with HPV test, you only need to test every 5 years, unless you have an abnormal result.     You do not need a Pap test if your uterus was removed (hysterectomy) and you have not had cancer.    You should be tested each year for STDs (sexually transmitted diseases) if you're at risk.     Have a mammogram every 1 to 2 years.    Have a colonoscopy at age 50, or have a yearly FIT test (stool test). These exams screen for colon cancer.      Have a cholesterol test every 5 years, or more often if advised.    Have a diabetes test (fasting glucose) every three years. If you are at risk for diabetes, you should have this test more often.     If you are at risk for osteoporosis (brittle bone disease), think about having a bone density scan (DEXA).    Shots: Get a flu shot each year. Get a tetanus shot every 10 years.    Nutrition:     Eat at least 5 servings of fruits and vegetables each day.    Eat whole-grain bread, whole-wheat pasta and brown rice instead of white grains and rice.    Get adequate Calcium and Vitamin D.     Lifestyle    Exercise at least 150 minutes a week (30 minutes a day, 5 days a week). This will help you control your weight and prevent disease.    Limit alcohol to one drink per day.    No smoking.     Wear  sunscreen to prevent skin cancer.     See your dentist every six months for an exam and cleaning.    See your eye doctor every 1 to 2 years.    Thank you so much or choosing [facility]  for your Health Care. It was a pleasure seeing you at your visit today! Please contact us with any questions or concerns you may have.                   Phuong Story MD                              To reach your [facility] care team after hours call:   [phone]    PLEASE NOTE OUR HOURS HAVE CHANGED secondary to COVID-19 coronavirus pandemic, as we are trying to minimize patient exposure to the virus,  which is now widespread in the [facility].  These hours may change with very little notice.  We apologize for any inconvenience.       Our current clinic hours are:          Monday- Thursday   7:00am - 6:00pm  in person.      Friday  7:00am- 5:00pm                       Saturday and Sunday : Closed to in person and virtual visits        We have telephone and virtual visit times available between    7:00am - 6pm on Monday-Friday as well.                                                Phone:  [phone]      Our pharmacy hours: Monday through Friday 8:00am to 5:00pm                        Saturday - 9:00 am to 12 noon       Sunday : Closed.              Phone:  [phone]              ###  Please note: at this time we are not accepting any walk-in visits. ###      There is also information available at our web site:  [email or web address]    If your provider ordered any lab tests and you do not receive the results within 10 business days, please call the clinic.    If you need a medication refill please contact your pharmacy.  Please allow 3 business days for your refill to be completed.    Our clinic offers telephone visits and e visits.  Please ask one of your team members to explain more.      Use [employer] (secure email communication and access to your chart) to send your  primary care provider a message or make an appointment. Ask someone on your Team how to sign up for MyChart.

## 2021-12-19 LAB
CREAT UR-MCNC: 48 MG/DL
MICROALBUMIN UR-MCNC: 12 MG/L
MICROALBUMIN/CREAT UR: 25 MG/G CR (ref 0–25)

## 2021-12-21 LAB
CHOLEST SERPL-MCNC: 253 MG/DL
DEPRECATED CALCIDIOL+CALCIFEROL SERPL-MC: <61 UG/L (ref 20–75)
FASTING STATUS PATIENT QL REPORTED: YES
HDLC SERPL-MCNC: 59 MG/DL
LDLC SERPL CALC-MCNC: 141 MG/DL
NONHDLC SERPL-MCNC: 194 MG/DL
TRIGL SERPL-MCNC: 267 MG/DL
VITAMIN D2 SERPL-MCNC: <5 UG/L
VITAMIN D3 SERPL-MCNC: 56 UG/L

## 2022-01-12 PROBLEM — E03.9 HYPOTHYROIDISM, UNSPECIFIED TYPE: Status: ACTIVE | Noted: 2022-01-12

## 2022-01-12 PROBLEM — E03.8 SUBCLINICAL HYPOTHYROIDISM: Status: ACTIVE | Noted: 2022-01-12

## 2022-02-10 ENCOUNTER — HOSPITAL ENCOUNTER (OUTPATIENT)
Dept: MAMMOGRAPHY | Facility: CLINIC | Age: 67
Discharge: HOME | End: 2022-02-10
Attending: FAMILY MEDICINE | Admitting: FAMILY MEDICINE
Payer: COMMERCIAL

## 2022-02-10 DIAGNOSIS — Z12.31 VISIT FOR SCREENING MAMMOGRAM: ICD-10-CM

## 2022-02-10 PROCEDURE — 77067 SCR MAMMO BI INCL CAD: CPT

## 2022-02-14 ENCOUNTER — MYC MEDICAL ADVICE (OUTPATIENT)
Dept: FAMILY MEDICINE | Facility: CLINIC | Age: 67
End: 2022-02-14
Payer: COMMERCIAL

## 2022-02-15 NOTE — TELEPHONE ENCOUNTER
Please see my chart message below     Please review and advise     Thank you     Cortney Call RN, BSN  [facility]

## 2022-03-01 RX ORDER — ROSUVASTATIN CALCIUM 5 MG/1
5 TABLET, COATED ORAL DAILY
Qty: 90 TABLET | Refills: 0 | Status: SHIPPED | OUTPATIENT
Start: 2022-03-01 | End: 2022-06-14

## 2022-03-02 NOTE — RESULT ENCOUNTER NOTE
Edwina Parsons  I have reviewed your recent labs. Here are the results:    -Normal red blood cell (hgb) levels, normal white blood cell count and normal   -Lipid levels (cholesterol) would benefit from an increase in your rosuvastatin dose.  I will increase your rosuvastatin from 5 to 10 mg - plan to recheck with a fasting lab only visit in 6-8 weeks.  -Liver and gallbladder tests are normal (ALT,AST, Alk phos, bilirubin), kidney function is normal (Cr, GFR), sodium is normal, potassium is normal, calcium is normal, glucose is normal.  -TSH (thyroid stimulating hormone) level is low, but your T4 and T3 is normal which indicates appropriate thyroid replacement dosing.  ADVISE: continuing same replacement dose and rechecking this in 6-8 weeks with cholesterol recheck.  -Vitamin D level is optimal - continue your current supplementation.  -Microalbumin (urine protein) test is normal.  ADVISE: rechecking this annually.    For additional lab test information, labtestsonline.org is an excellent reference.       If you have any questions please do not hesitate to contact our office via phone ([phone]) or MyChart.    Martita Armendariz, MS, PA-C (covering for Dr. Story)  [facility] [place]

## 2022-05-26 DIAGNOSIS — E03.8 SUBCLINICAL HYPOTHYROIDISM: ICD-10-CM

## 2022-05-26 RX ORDER — LEVOTHYROXINE SODIUM 25 UG/1
TABLET ORAL
Qty: 30 TABLET | Refills: 1 | Status: SHIPPED | OUTPATIENT
Start: 2022-05-26 | End: 2022-06-22

## 2022-05-26 NOTE — TELEPHONE ENCOUNTER
Routing refill request to provider for review/approval because:  Labs not current:    TSH   Date Value Ref Range Status   12/17/2021 0.27 (L) 0.40 - 4.00 mU/L Final   05/10/2021 4.03 (H) 0.40 - 4.00 mU/L Final     Jelani HICKMAN RN   [facility]

## 2022-05-26 NOTE — TELEPHONE ENCOUNTER
Future Appointments 5/26/2022 - 11/22/2022    None         TSH   Date Value Ref Range Status   12/17/2021 0.27 (L) 0.40 - 4.00 mU/L Final   08/26/2021 1.56 0.40 - 4.00 mU/L Final   05/10/2021 4.03 (H) 0.40 - 4.00 mU/L Final   12/09/2020 6.02 (H) 0.40 - 4.00 mU/L Final   06/29/2019 2.72 0.40 - 4.00 mU/L Final   07/20/2018 3.80 0.40 - 4.00 mU/L Final   09/18/2017 2.90 0.40 - 4.00 mU/L Final     T4 Free   Date Value Ref Range Status   05/10/2021 0.77 0.76 - 1.46 ng/dL Final     Free T4   Date Value Ref Range Status   12/17/2021 1.01 0.76 - 1.46 ng/dL Final     Needs labs rechecked. Ok for #30 tabs till then.   Please inform patient. .Please assist pt in making appt(s) for the above.

## 2022-06-11 DIAGNOSIS — E78.5 HYPERLIPIDEMIA WITH TARGET LDL LESS THAN 130: ICD-10-CM

## 2022-06-14 RX ORDER — ROSUVASTATIN CALCIUM 5 MG/1
TABLET, COATED ORAL
Qty: 90 TABLET | Refills: 0 | Status: SHIPPED | OUTPATIENT
Start: 2022-06-14 | End: 2022-09-13

## 2022-06-14 NOTE — TELEPHONE ENCOUNTER
Prescription approved per [facility] Refill Protocol.    Sue Palacio RN  [facility]

## 2022-06-16 ENCOUNTER — LAB (OUTPATIENT)
Dept: LAB | Facility: CLINIC | Age: 67
End: 2022-06-16
Payer: COMMERCIAL

## 2022-06-16 DIAGNOSIS — E03.8 SUBCLINICAL HYPOTHYROIDISM: ICD-10-CM

## 2022-06-16 DIAGNOSIS — E78.5 HYPERLIPIDEMIA WITH TARGET LDL LESS THAN 130: ICD-10-CM

## 2022-06-16 LAB
CHOLEST SERPL-MCNC: 226 MG/DL
FASTING STATUS PATIENT QL REPORTED: YES
HDLC SERPL-MCNC: 62 MG/DL
LDLC SERPL CALC-MCNC: 123 MG/DL
T4 FREE SERPL-MCNC: 0.96 NG/DL (ref 0.7–1.8)
TRIGL SERPL-MCNC: 204 MG/DL
TSH SERPL DL<=0.005 MIU/L-ACNC: 3.3 UIU/ML (ref 0.3–5)

## 2022-06-16 PROCEDURE — 80061 LIPID PANEL: CPT

## 2022-06-16 PROCEDURE — 84480 ASSAY TRIIODOTHYRONINE (T3): CPT

## 2022-06-16 PROCEDURE — 84439 ASSAY OF FREE THYROXINE: CPT

## 2022-06-16 PROCEDURE — 36415 COLL VENOUS BLD VENIPUNCTURE: CPT

## 2022-06-16 PROCEDURE — 84443 ASSAY THYROID STIM HORMONE: CPT

## 2022-06-17 LAB — T3 SERPL-MCNC: 116 NG/DL (ref 60–181)

## 2022-09-30 DIAGNOSIS — E03.8 SUBCLINICAL HYPOTHYROIDISM: ICD-10-CM

## 2022-09-30 RX ORDER — LEVOTHYROXINE SODIUM 25 UG/1
TABLET ORAL
Qty: 30 TABLET | Refills: 1 | Status: SHIPPED | OUTPATIENT
Start: 2022-09-30 | End: 2022-11-29

## 2022-09-30 NOTE — TELEPHONE ENCOUNTER
Routing refill request to provider for review/approval because:  A break in medication    Tiara GILMORE RN

## 2022-10-15 ENCOUNTER — HEALTH MAINTENANCE LETTER (OUTPATIENT)
Age: 67
End: 2022-10-15

## 2022-10-28 DIAGNOSIS — E03.8 SUBCLINICAL HYPOTHYROIDISM: ICD-10-CM

## 2022-10-28 NOTE — TELEPHONE ENCOUNTER
Patient is in need of another month and was never told to make an appt so she thinks she is okay to continue on this medication.  Can she have another axel if that's waht you call and and be notified if she needs to see a doctor.

## 2022-10-31 RX ORDER — LEVOTHYROXINE SODIUM 25 UG/1
25 TABLET ORAL DAILY
Qty: 30 TABLET | Refills: 1 | OUTPATIENT
Start: 2022-10-31

## 2022-11-29 DIAGNOSIS — E03.8 SUBCLINICAL HYPOTHYROIDISM: ICD-10-CM

## 2022-11-29 RX ORDER — LEVOTHYROXINE SODIUM 25 UG/1
TABLET ORAL
Qty: 30 TABLET | Refills: 0 | Status: SHIPPED | OUTPATIENT
Start: 2022-11-29 | End: 2022-12-29

## 2022-11-29 NOTE — TELEPHONE ENCOUNTER
Medication is being filled for 1 time refill only due to:  Patient needs to be seen because needs OV December.    Routing to MA/TC pool. The Pt is due for a visit with PCP   Alta RPECIADO RN, BSN

## 2022-12-22 ENCOUNTER — TELEPHONE (OUTPATIENT)
Dept: FAMILY MEDICINE | Facility: CLINIC | Age: 67
End: 2022-12-22

## 2022-12-22 NOTE — TELEPHONE ENCOUNTER
Patient calling stating she picked up her Crestor from [facility]     He patient states the bottle says take 5mg twice a week     12/20/2021 order for Crestor did state twice a week then order was discontinued     pharmacy stated the patient probably punch in the old rx # from her bottle so 2 times a week rx was filled   He will inactivate the 2 times a week order    Directed patient to take the 5mg daily until gone then can  the Crestor with the accurate instructions.     Patient verbalized understanding     Dana HERNANDEZ RN   [facility]

## 2022-12-26 ENCOUNTER — TELEPHONE (OUTPATIENT)
Dept: FAMILY MEDICINE | Facility: CLINIC | Age: 67
End: 2022-12-26

## 2022-12-26 ENCOUNTER — MYC MEDICAL ADVICE (OUTPATIENT)
Dept: FAMILY MEDICINE | Facility: CLINIC | Age: 67
End: 2022-12-26

## 2022-12-26 ENCOUNTER — TELEPHONE (OUTPATIENT)
Dept: FAMILY MEDICINE | Facility: CLINIC | Age: 67
End: 2022-12-26
Payer: COMMERCIAL

## 2022-12-26 DIAGNOSIS — U07.1 INFECTION DUE TO 2019 NOVEL CORONAVIRUS: Primary | ICD-10-CM

## 2022-12-26 PROCEDURE — 99207 PR NO CHARGE LOS: CPT | Mod: CS | Performed by: FAMILY MEDICINE

## 2022-12-26 NOTE — TELEPHONE ENCOUNTER
Symptoms    Describe your symptoms:usb tested positive for Covid and Dr HARRIS gave him RX, now she has also tested positive, and would like the same prescription. He is on his way to the pharmacy this am and is it possible to get some for her.      How long have you been having symptoms: 24 hours but has tested positive   Have you been seen for this:  no  Appointment offered?: No    Triage offered?: No    Preferred Pharmacy:  [facility] in [place].  Could we send this information to you in [facility] or would you prefer to receive a phone call?:   Patient would prefer a phone call   Okay to leave a detailed message?: Yes at Cell number on file:    Telephone Information:   Mobile [phone]

## 2022-12-26 NOTE — TELEPHONE ENCOUNTER
Called to assess for RN COVID TREATMENT VISIT  12/26/22    Edwina Zhou  67 year old  Current weight? 170 lbs    Has the patient been seen by a primary care provider at an [facility] or [facility] within the past two years? Yes.   Have you been in close proximity to/do you have a known exposure to a person with a confirmed case of influenza? No.     Date of positive COVID test (PCR or at home)?  Today 12/26/22    Advised to call the triage line for new, persistent or worsening symptoms.   Covid patient instructions sent to patient via [employer]     advised of central scheduling phone # to call and scheduled Covid 19 treatment   Kidney function has not been test since 12/2021 - advised virtual appointment -provider may want checked before     Dana HERNANDEZ RN   [facility]

## 2023-02-12 ASSESSMENT — ACTIVITIES OF DAILY LIVING (ADL): CURRENT_FUNCTION: NO ASSISTANCE NEEDED

## 2023-02-12 ASSESSMENT — ENCOUNTER SYMPTOMS
BREAST MASS: 0
ARTHRALGIAS: 1
SORE THROAT: 0
NERVOUS/ANXIOUS: 0
FREQUENCY: 0
PALPITATIONS: 0
HEMATOCHEZIA: 0
ABDOMINAL PAIN: 1
DIARRHEA: 0
MYALGIAS: 1
HEADACHES: 0
NAUSEA: 0
HEMATURIA: 0
FEVER: 0
CONSTIPATION: 0
JOINT SWELLING: 0
EYE PAIN: 0
DIZZINESS: 0
WEAKNESS: 0
PARESTHESIAS: 0
HEARTBURN: 0
COUGH: 0
SHORTNESS OF BREATH: 0
CHILLS: 0
DYSURIA: 0

## 2023-02-13 ENCOUNTER — OFFICE VISIT (OUTPATIENT)
Dept: FAMILY MEDICINE | Facility: CLINIC | Age: 68
End: 2023-02-13
Payer: COMMERCIAL

## 2023-02-13 VITALS
HEIGHT: 61 IN | DIASTOLIC BLOOD PRESSURE: 66 MMHG | HEART RATE: 69 BPM | SYSTOLIC BLOOD PRESSURE: 116 MMHG | BODY MASS INDEX: 32.47 KG/M2 | WEIGHT: 172 LBS | OXYGEN SATURATION: 99 % | TEMPERATURE: 97.1 F

## 2023-02-13 DIAGNOSIS — M77.42 METATARSALGIA OF BOTH FEET: ICD-10-CM

## 2023-02-13 DIAGNOSIS — M77.41 METATARSALGIA OF BOTH FEET: ICD-10-CM

## 2023-02-13 DIAGNOSIS — D22.9 MULTIPLE PIGMENTED NEVI: ICD-10-CM

## 2023-02-13 DIAGNOSIS — L57.8 SUN-DAMAGED SKIN: ICD-10-CM

## 2023-02-13 DIAGNOSIS — H01.001 BLEPHARITIS OF RIGHT UPPER EYELID, UNSPECIFIED TYPE: ICD-10-CM

## 2023-02-13 DIAGNOSIS — E55.9 VITAMIN D DEFICIENCY: ICD-10-CM

## 2023-02-13 DIAGNOSIS — E78.5 HYPERLIPIDEMIA WITH TARGET LDL LESS THAN 130: ICD-10-CM

## 2023-02-13 DIAGNOSIS — E03.8 SUBCLINICAL HYPOTHYROIDISM: ICD-10-CM

## 2023-02-13 DIAGNOSIS — M79.672 BILATERAL FOOT PAIN: ICD-10-CM

## 2023-02-13 DIAGNOSIS — Z12.31 VISIT FOR SCREENING MAMMOGRAM: ICD-10-CM

## 2023-02-13 DIAGNOSIS — G89.29 CHRONIC PAIN OF BOTH SHOULDERS: ICD-10-CM

## 2023-02-13 DIAGNOSIS — Z23 NEED FOR PNEUMOCOCCAL VACCINE: ICD-10-CM

## 2023-02-13 DIAGNOSIS — M25.511 CHRONIC PAIN OF BOTH SHOULDERS: ICD-10-CM

## 2023-02-13 DIAGNOSIS — M79.671 BILATERAL FOOT PAIN: ICD-10-CM

## 2023-02-13 DIAGNOSIS — Z13.0 SCREENING, ANEMIA, DEFICIENCY, IRON: ICD-10-CM

## 2023-02-13 DIAGNOSIS — M25.512 CHRONIC PAIN OF BOTH SHOULDERS: ICD-10-CM

## 2023-02-13 DIAGNOSIS — Z00.01 ENCOUNTER FOR ROUTINE ADULT MEDICAL EXAM WITH ABNORMAL FINDINGS: Primary | ICD-10-CM

## 2023-02-13 DIAGNOSIS — M17.0 PRIMARY OSTEOARTHRITIS OF BOTH KNEES: ICD-10-CM

## 2023-02-13 LAB
ALBUMIN SERPL BCG-MCNC: 4.3 G/DL (ref 3.5–5.2)
ALP SERPL-CCNC: 71 U/L (ref 35–104)
ALT SERPL W P-5'-P-CCNC: 20 U/L (ref 10–35)
ANION GAP SERPL CALCULATED.3IONS-SCNC: 11 MMOL/L (ref 7–15)
AST SERPL W P-5'-P-CCNC: 29 U/L (ref 10–35)
BILIRUB SERPL-MCNC: 0.3 MG/DL
BUN SERPL-MCNC: 9.2 MG/DL (ref 8–23)
CALCIUM SERPL-MCNC: 9.4 MG/DL (ref 8.8–10.2)
CHLORIDE SERPL-SCNC: 104 MMOL/L (ref 98–107)
CHOLEST SERPL-MCNC: 186 MG/DL
CREAT SERPL-MCNC: 0.76 MG/DL (ref 0.51–0.95)
CREAT UR-MCNC: 26.7 MG/DL
DEPRECATED HCO3 PLAS-SCNC: 27 MMOL/L (ref 22–29)
ERYTHROCYTE [DISTWIDTH] IN BLOOD BY AUTOMATED COUNT: 12.3 % (ref 10–15)
GFR SERPL CREATININE-BSD FRML MDRD: 86 ML/MIN/1.73M2
GLUCOSE SERPL-MCNC: 97 MG/DL (ref 70–99)
HCT VFR BLD AUTO: 39.6 % (ref 35–47)
HDLC SERPL-MCNC: 53 MG/DL
HGB BLD-MCNC: 13.1 G/DL (ref 11.7–15.7)
LDLC SERPL CALC-MCNC: 90 MG/DL
MCH RBC QN AUTO: 30.7 PG (ref 26.5–33)
MCHC RBC AUTO-ENTMCNC: 33.1 G/DL (ref 31.5–36.5)
MCV RBC AUTO: 93 FL (ref 78–100)
MICROALBUMIN UR-MCNC: <12 MG/L
MICROALBUMIN/CREAT UR: NORMAL MG/G{CREAT}
NONHDLC SERPL-MCNC: 133 MG/DL
PLATELET # BLD AUTO: 275 10E3/UL (ref 150–450)
POTASSIUM SERPL-SCNC: 4.5 MMOL/L (ref 3.4–5.3)
PROT SERPL-MCNC: 7.1 G/DL (ref 6.4–8.3)
RBC # BLD AUTO: 4.27 10E6/UL (ref 3.8–5.2)
SODIUM SERPL-SCNC: 142 MMOL/L (ref 136–145)
T3 SERPL-MCNC: 116 NG/DL (ref 85–202)
T4 FREE SERPL-MCNC: 0.96 NG/DL (ref 0.9–1.7)
TRIGL SERPL-MCNC: 213 MG/DL
TSH SERPL DL<=0.005 MIU/L-ACNC: 4.41 UIU/ML (ref 0.3–4.2)
WBC # BLD AUTO: 7.2 10E3/UL (ref 4–11)

## 2023-02-13 PROCEDURE — 84439 ASSAY OF FREE THYROXINE: CPT | Performed by: FAMILY MEDICINE

## 2023-02-13 PROCEDURE — 82043 UR ALBUMIN QUANTITATIVE: CPT | Performed by: FAMILY MEDICINE

## 2023-02-13 PROCEDURE — 82570 ASSAY OF URINE CREATININE: CPT | Performed by: FAMILY MEDICINE

## 2023-02-13 PROCEDURE — 36415 COLL VENOUS BLD VENIPUNCTURE: CPT | Performed by: FAMILY MEDICINE

## 2023-02-13 PROCEDURE — 99214 OFFICE O/P EST MOD 30 MIN: CPT | Mod: 25 | Performed by: FAMILY MEDICINE

## 2023-02-13 PROCEDURE — 82306 VITAMIN D 25 HYDROXY: CPT | Performed by: FAMILY MEDICINE

## 2023-02-13 PROCEDURE — 80053 COMPREHEN METABOLIC PANEL: CPT | Performed by: FAMILY MEDICINE

## 2023-02-13 PROCEDURE — 84480 ASSAY TRIIODOTHYRONINE (T3): CPT | Performed by: FAMILY MEDICINE

## 2023-02-13 PROCEDURE — 80061 LIPID PANEL: CPT | Performed by: FAMILY MEDICINE

## 2023-02-13 PROCEDURE — 99397 PER PM REEVAL EST PAT 65+ YR: CPT | Mod: 25 | Performed by: FAMILY MEDICINE

## 2023-02-13 PROCEDURE — 85027 COMPLETE CBC AUTOMATED: CPT | Performed by: FAMILY MEDICINE

## 2023-02-13 PROCEDURE — 90677 PCV20 VACCINE IM: CPT | Performed by: FAMILY MEDICINE

## 2023-02-13 PROCEDURE — 84443 ASSAY THYROID STIM HORMONE: CPT | Performed by: FAMILY MEDICINE

## 2023-02-13 PROCEDURE — 90471 IMMUNIZATION ADMIN: CPT | Performed by: FAMILY MEDICINE

## 2023-02-13 RX ORDER — LEVOTHYROXINE SODIUM 25 UG/1
25 TABLET ORAL DAILY
Qty: 90 TABLET | Refills: 3 | Status: SHIPPED | OUTPATIENT
Start: 2023-02-13 | End: 2024-02-29

## 2023-02-13 RX ORDER — HYDROCORTISONE 2.5 %
CREAM (GRAM) TOPICAL
Qty: 20 G | Refills: 3 | Status: SHIPPED | OUTPATIENT
Start: 2023-02-13

## 2023-02-13 ASSESSMENT — ENCOUNTER SYMPTOMS
ARTHRALGIAS: 1
FEVER: 0
DIZZINESS: 0
NERVOUS/ANXIOUS: 0
MYALGIAS: 1
HEMATOCHEZIA: 0
FREQUENCY: 0
JOINT SWELLING: 0
PALPITATIONS: 0
SHORTNESS OF BREATH: 0
CONSTIPATION: 0
ABDOMINAL PAIN: 1
DIARRHEA: 0
CHILLS: 0
WEAKNESS: 0
SORE THROAT: 0
BREAST MASS: 0
HEADACHES: 0
NAUSEA: 0
DYSURIA: 0
HEMATURIA: 0
COUGH: 0
PARESTHESIAS: 0
EYE PAIN: 0
HEARTBURN: 0

## 2023-02-13 ASSESSMENT — ACTIVITIES OF DAILY LIVING (ADL): CURRENT_FUNCTION: NO ASSISTANCE NEEDED
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
## 2023-02-13 NOTE — PATIENT INSTRUCTIONS
[address] [address]  Office: [phone]   Fax:    [phone]     Try an oral probiotic over the counter such as Culturelle, Align. IbSium, Florastor or UltraFlora  Intensive Care  to help restore your natural gut bacteria and improve digestion.   May also use Beano , an over the counter supplement, which contains digestive enzymes as well to improve digestion and possible decrease gassiness and bloating related to eating, especially eating higher fiber foods.        Preventive Health Recommendations    See your health care provider every year to  Review health changes.   Discuss preventive care.    Review your medicines if your doctor has prescribed any.    You no longer need a yearly Pap test unless you've had an abnormal Pap test in the past 10 years. If you have vaginal symptoms, such as bleeding or discharge, be sure to talk with your provider about a Pap test.    Every 1 to 2 years, have a mammogram.  If you are over 69, talk with your health care provider about whether or not you want to continue having screening mammograms.    Every 10 years, have a colonoscopy. Or, have a yearly FIT test (stool test). These exams will check for colon cancer.     Have a cholesterol test every 5 years, or more often if your doctor advises it.     Have a diabetes test (fasting glucose) every three years. If you are at risk for diabetes, you should have this test more often.     At age 65, have a bone density scan (DEXA) to check for osteoporosis (brittle bone disease).    Shots:  Get a flu shot each year.  Get a tetanus shot every 10 years.  Talk to your doctor about your pneumonia vaccines. There are now two you should receive - Pneumovax (PPSV 23) and Prevnar (PCV 13).  Talk to your pharmacist about the shingles vaccine.  Talk to your doctor about the hepatitis B vaccine.    Nutrition:   Eat at least 5 servings of fruits and vegetables each day.    Eat  whole-grain bread, whole-wheat pasta and brown rice instead of white grains and rice.    Get adequate about Calcium and Vitamin D.     Lifestyle  Exercise at least 150 minutes a week (30 minutes a day, 5 days a week). This will help you control your weight and prevent disease.    Limit alcohol to one drink per day.    No smoking.     Wear sunscreen to prevent skin cancer.     See your dentist twice a year for an exam and cleaning.    See your eye doctor every 1 to 2 years to screen for conditions such as glaucoma, macular degeneration, cataracts, etc.    Personalized Prevention Plan  You are due for the preventive services outlined below.  Your care team is available to assist you in scheduling these services.  If you have already completed any of these items, please share that information with your care team to update in your medical record.    Health Maintenance Due   Topic Date Due    Osteoporosis Screening  Never done    Pneumococcal Vaccine (1 - PCV) Never done    Annual Wellness Visit  12/17/2022    ANNUAL REVIEW OF HM ORDERS  12/17/2022    Mammogram  02/10/2023         Patient Education   Personalized Prevention Plan  You are due for the preventive services outlined below.  Your care team is available to assist you in scheduling these services.  If you have already completed any of these items, please share that information with your care team to update in your medical record.  Health Maintenance Due   Topic Date Due    Osteoporosis Screening  Never done    Pneumococcal Vaccine (1 - PCV) Never done    Mammogram  02/10/2023     Preventive Health Recommendations    See your health care provider every year to  Review health changes.   Discuss preventive care.    Review your medicines if your doctor has prescribed any.  You no longer need a yearly Pap test unless you've had an abnormal Pap test in the past 10 years. If you have vaginal symptoms, such as bleeding or discharge, be sure to talk with your provider  about a Pap test.  Every 1 to 2 years, have a mammogram.  If you are over 69, talk with your health care provider about whether or not you want to continue having screening mammograms.  Every 10 years, have a colonoscopy. Or, have a yearly FIT test (stool test). These exams will check for colon cancer.   Have a cholesterol test every 5 years, or more often if your doctor advises it.   Have a diabetes test (fasting glucose) every three years. If you are at risk for diabetes, you should have this test more often.   At age 65, have a bone density scan (DEXA) to check for osteoporosis (brittle bone disease).    Shots:  Get a flu shot each year.  Get a tetanus shot every 10 years.  Talk to your doctor about your pneumonia vaccines. There are now two you should receive - Pneumovax (PPSV 23) and Prevnar (PCV 13).  Talk to your pharmacist about the shingles vaccine.  Talk to your doctor about the hepatitis B vaccine.    Nutrition:   Eat at least 5 servings of fruits and vegetables each day.  Eat whole-grain bread, whole-wheat pasta and brown rice instead of white grains and rice.  Get adequate Calcium and Vitamin D.     Lifestyle  Exercise at least 150 minutes a week (30 minutes a day, 5 days a week). This will help you control your weight and prevent disease.  Limit alcohol to one drink per day.  No smoking.   Wear sunscreen to prevent skin cancer.   See your dentist twice a year for an exam and cleaning.  See your eye doctor every 1 to 2 years to screen for conditions such as glaucoma, macular degeneration and cataracts.    Personalized Prevention Plan  You are due for the preventive services outlined below.  Your care team is available to assist you in scheduling these services.  If you have already completed any of these items, please share that information with your care team to update in your medical record.  Health Maintenance   Topic Date Due    DEXA  Never done    Pneumococcal Vaccine: 65+ Years (1 - PCV) Never  done    MAMMO SCREENING  02/10/2023    MEDICARE ANNUAL WELLNESS VISIT  02/13/2024    ANNUAL REVIEW OF HM ORDERS  02/13/2024    FALL RISK ASSESSMENT  02/13/2024    ADVANCE CARE PLANNING  01/10/2026    LIPID  06/16/2027    DTAP/TDAP/TD IMMUNIZATION (5 - Td or Tdap) 11/05/2028    COLORECTAL CANCER SCREENING  08/02/2029    HEPATITIS C SCREENING  Completed    HIV SCREENING  Completed    PHQ-2 (once per calendar year)  Completed    INFLUENZA VACCINE  Completed    ZOSTER IMMUNIZATION  Completed    COVID-19 Vaccine  Completed    IPV IMMUNIZATION  Aged Out    MENINGITIS IMMUNIZATION  Aged Out    PAP  Discontinued     Your Health Risk Assessment indicates you feel you are not in good health    A healthy lifestyle helps keep the body fit and the mind alert. It helps protect you from disease, helps you fight disease, and helps prevent chronic disease (disease that doesn't go away) from getting worse. This is important as you get older and begin to notice twinges in muscles and joints and a decline in the strength and stamina you once took for granted. A healthy lifestyle includes good healthcare, good nutrition, weight control, recreation, and regular exercise. Avoid harmful substances and do what you can to keep safe. Another part of a healthy lifestyle is stay mentally active and socially involved.    Good healthcare   Have a wellness visit every year.   If you have new symptoms, let us know right away. Don't wait until the next checkup.   Take medicines exactly as prescribed and keep your medicines in a safe place. Tell us if your medicine causes problems.   Healthy diet and weight control   Eat 3 or 4 small, nutritious, low-fat, high-fiber meals a day. Include a variety of fruits, vegetables, and whole-grain foods.   Make sure you get enough calcium in your diet. Calcium, vitamin D, and exercise help prevent osteoporosis (bone thinning).   If you live alone, try eating with others when you can. That way you get a good meal  "and have company while you eat it.   Try to keep a healthy weight. If you eat more calories than your body uses for energy, it will be stored as fat and you will gain weight.     Recreation   Recreation is not limited to sports and team events. It includes any activity that provides relaxation, interest, enjoyment, and exercise. Recreation provides an outlet for physical, mental, and social energy. It can give a sense of worth and achievement. It can help you stay healthy.    Mental Exercise and Social Involvement  Mental and emotional health is as important as physical health. Keep in touch with friends and family. Stay as active as possible. Continue to learn and challenge yourself.   Things you can do to stay mentally active are:  Learn something new, like a foreign language or musical instrument.   Play SCRABBLE or do crossword puzzles. If you cannot find people to play these games with you at home, you can play them with others on your computer through the Internet.   Join a games club--anything from card games to chess or checkers or lawn bowling.   Start a new hobby.   Go back to school.   Volunteer.   Read.   Keep up with world events.    Thank you so much or choosing [facility]  for your Health Care. It was a pleasure seeing you at your visit today! Please contact us with any questions or concerns you may have.                   Phuong Story MD                              To reach your [facility] care team after hours call:   [phone] press #2 \"to speak with your care team\".  This will get you to our clinic instead of routing to central [facility]  scheduling.     PLEASE NOTE OUR HOURS HAVE CHANGED secondary to COVID-19 coronavirus pandemic, as we are trying to minimize patient exposure to the virus,  which is now widespread in the [facility].  These hours may change with very little notice.  We apologize for any inconvenience. "       Our current clinic hours are:          Monday- Thursday   7:00am - 6:00pm  in person.      Friday  7:00am- 5:00pm                       Saturday and Sunday : Closed to in person and virtual visits        We have telephone and virtual visit times available between    7:00am - 6pm on Monday-Friday as well.                                                Phone:  [phone]      Our pharmacy hours: Monday through Friday 8:00am to 5:00pm                        Saturday - 9:00 am to 12 noon       Sunday : Closed.              Phone:  [phone]              ###  Please note: at this time we are not accepting any walk-in visits. ###      There is also information available at our web site:  [email or web address]    If your provider ordered any lab tests and you do not receive the results within 10 business days, please call the clinic.    If you need a medication refill please contact your pharmacy.  Please allow 3 business days for your refill to be completed.    Our clinic offers telephone visits and e visits.  Please ask one of your team members to explain more.      Use [email or web address] (secure email communication and access to your chart) to send your primary care provider a message or make an appointment. Ask someone on your Team how to sign up for [employer].

## 2023-02-14 ENCOUNTER — TELEPHONE (OUTPATIENT)
Dept: FAMILY MEDICINE | Facility: CLINIC | Age: 68
End: 2023-02-14

## 2023-02-14 ENCOUNTER — ANCILLARY PROCEDURE (OUTPATIENT)
Dept: MAMMOGRAPHY | Facility: CLINIC | Age: 68
End: 2023-02-14
Payer: COMMERCIAL

## 2023-02-14 DIAGNOSIS — Z12.31 VISIT FOR SCREENING MAMMOGRAM: ICD-10-CM

## 2023-02-14 PROCEDURE — 77063 BREAST TOMOSYNTHESIS BI: CPT | Mod: TC | Performed by: RADIOLOGY

## 2023-02-14 PROCEDURE — 77067 SCR MAMMO BI INCL CAD: CPT | Mod: TC | Performed by: RADIOLOGY

## 2023-02-15 NOTE — TELEPHONE ENCOUNTER
Pt calling stating she got a pneumonia vaccine on her left shoulder - it is sore and has a hard lump.   States that it slightly red, and warm to the touch.    It is about an inch in size for the lump.  - RN advised to try a warm pack to the area a few times a day to  Help reduce the lump   Reviewed worsening symptoms and if they were to happen to call clinic back    Patient stated an understanding and agreed with plan.      Pt has been on Crestor/rostuvastatin 5 mg in the past and now it is not on her med list - she knows she is suppose to be on it, and needs refills - please advise       Best # [phone] ok LM      Thank you     Cortney Call RN, BSN  [facility]

## 2023-02-16 RX ORDER — ROSUVASTATIN CALCIUM 5 MG/1
5 TABLET, COATED ORAL DAILY
Qty: 90 TABLET | Refills: 3 | Status: SHIPPED | OUTPATIENT
Start: 2023-02-16 | End: 2023-08-02

## 2023-02-17 NOTE — RESULT ENCOUNTER NOTE
Your mammogram was normal.     Thank you so much for choosing [facility].  Please contact us with any questions that you may have.   We appreciate the opportunity to serve you now and look forward to supporting your healthcare needs for a long time to come!    Most Sincerely,     Phuong Story MD

## 2023-02-24 NOTE — PROGRESS NOTES
ASSESSMENT & PLAN  Patient Instructions     1. Neck pain    2. Chronic pain of both shoulders    3. Impingement syndrome of left shoulder    4. Strain of neck muscle, initial encounter    5. Muscle strain of left scapular region, initial encounter    6. Cervical radicular pain    7. Muscle strain of right scapular region, initial encounter    8. Fall, initial encounter    9. Contusion of right forearm, initial encounter    10. Fibromyalgia      -Patient has neck and bilateral shoulder pain and due to muscle strains and spasms as well as some contribution from her fibromyalgia  -Patient will start an oral steroid taper.  Patient will also start tizanidine 4 mg at nighttime  -Patient will follow up next Wednesday for reevaluation.  If patient continues to have significant muscle spasms, tenderness and impingement signs, will administer a left subacromial cortisone injection and trigger point injections of the left trapezius and around the shoulder blade  -Patient will then start formal physical therapy and home exercise program  -Patient may continue with massage and heating pad.  -Consider chiropractic treatments and acupuncture in the future  -Patient has a history of fibromyalgia which may be contributing to her chronic shoulder and upper back pains.  Patient was initially diagnosed with lupus and referred to rheumatology.  The rheumatologist ruled out lupus and diagnosed her with fibromyalgia.  Patient states that she was given some medications for the fibromyalgia which did not help and so she has not been taking anything for her chronic pain.  Patient has been trying to exercise which has helped.  Patient was informed that there are nonnarcotic pain medications that can help significantly with fibromyalgia especially when combined with regular exercise.  After we improve her acute pain and muscle spasms, we will try some chronic pain medications to ease her fibromyalgia pain.  I would recommend starting with  Cymbalta  -At the end of the visit patient also mentions recent fall 11 days ago landing on the right forearm causing persistent pain and deformity  -X-rays taken in office today show no acute fracture or dislocation.  Patient has soft tissue swelling and trauma  -Patient may ice the area.  The oral steroids and muscle relaxers should help with the soft tissue injury as well  -Call direct clinic number [[phone]] at any time with questions or concerns.    Albert Yeo MD [facility]  [facility]          -----    SUBJECTIVE  Edwina Zhou is a/an 67 year old Right handed female who is seen in consultation at the request of  Phuong Story M.D. for evaluation of bilateral shoulder pain. The patient is seen by themselves.    Onset: 9 month(s) ago, waxing and waning. Patient describes injury as patient fell and left posterior shoulder/scapula landed on doorstop  Location of Pain: bilateral scapula radiating to lateral shoulders, Notes constant neck pain and stiffness   Rating of Pain at worst: 9/10  Rating of Pain Currently: 7/10  Worsened by: sleeping, reaching high above her, lifting heavy objects, tender to palpation   Better with: heating pad   Treatments tried: ibuprofen, heating pad, massage   Associated symptoms: clicking with movement of shoulder, denies numbness/tinglging, notes weakness     Orthopedic history: YES - has fibromyalgia, rotator cuff injury with surgery   Relevant surgical history: YES - Date: left RTC repair ~5 years ago   Social history: works for [employer] in [employer]     Patient also notes last Monday, 11 days, she fell on her right arm and has a deformity on her lateral right forearm, which is tender to touch. She has not tried any treatment yet.     Past Medical History:   Diagnosis Date     Allergic rhinitis, cause unspecified      Arthritis      Dysphagia     had esophagram 12/07 - tertiary contractions of esoph with some  "retention, EGD showed gastritis, duodenitis and esophagitis ,pt didn't start a PPI       Fatigue      Fatigue      Gastroesophageal reflux disease      H/O YISEL I     s/p culpo with bx x 2 and cryotherapy - ok for paps q3yrs starting 2012     Helicobacter pylori (H. pylori) infection in 2009     Hyperlipidemia LDL goal < 130 02/04/2005    lipitor \"attacked my muscles\" (Hx fibromyalgia); [id number] failed to lower lipids enough&=myalgias also , lovastatin 20mg = myalgias      Hypothyroidism, unspecified type- needs lab follow up and ? 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     Comment: [email or web address]   Tobacco Use     Smoking status: Never     Smokeless tobacco: Never   Vaping Use     Vaping Use: Never used   Substance and Sexual Activity     Alcohol use: Yes     Comment: 0-2 glasses of wine weekly     Drug use: No     Sexual activity: Yes     Partners: Male     Birth control/protection: Post-menopausal     Comment:  to Niraj Zhou    Other Topics Concern      Service No     Blood Transfusions No     Caffeine Concern Yes     Comment: diet soda and tea occasionally      Exercise Yes     Comment: doing [place] Weight loss - horse back riding, [place] track, hiking      Seat Belt Yes     Comment: always      Self-Exams Yes     Comment: SBE encouraged monthly      Parent/sibling w/ CABG, MI or angioplasty before 65F 55M? No   Social History Narrative    Found " "6  - 1/2 siblings from her birth mother Angelica Bess - 3 prior to pt's birth from 1 father , then pt from a different father, then 3 from 3rd father . She has since passed away.  From complications of dementia.  - pt found them through 23&me.  Pt's daughter Brielle is doing family genealogy.  No genetic markers for dementia or breast cancer in pt's 23&me.  Pt's adoptive parents had passed when pt did her 23&me.  ---Phuong Story MD  - February 13, 2023          Patient's past medical, surgical, social, and family histories were reviewed today and no changes are noted.    REVIEW OF SYSTEMS:  10 point ROS is negative other than symptoms noted above in HPI, Past Medical History or as stated below  Constitutional: NEGATIVE for fever, chills, change in weight  Skin: NEGATIVE for worrisome rashes, moles or lesions  GI/: NEGATIVE for bowel or bladder changes  Neuro: NEGATIVE for weakness, dizziness or paresthesias    OBJECTIVE:  /78   Ht 1.549 m (5' 1\")   Wt 78 kg (172 lb)   LMP 06/03/2010   BMI 32.50 kg/m     General: healthy, alert and in no distress  HEENT: no scleral icterus or conjunctival erythema  Skin: no suspicious lesions or rash. No jaundice.  CV: regular rhythm by palpation  Resp: normal respiratory effort without conversational dyspnea   Psych: normal mood and affect  Gait: normal steady gait with appropriate coordination and balance  Neuro: normal light touch sensory exam of the bilateral upper extremities.    MSK:  LEFT SHOULDER  Inspection:    no swelling, bruising, discoloration, or obvious deformity or asymmetry  Palpation:    Tender about the infraspinatus insertion, upper trapezius region and rhomboids. Remainder of bony and tendinous landmarks are nontender.  Active Range of Motion:     Abduction 1050, FF 1350, , IR L4.      Scapular dyskinesis absent  Strength:    Scapular plane abduction painful,  ER weakness, IR grossly intact  Special Tests:    Positive: Sidney's, " Foley', supraspinatus (empty can), drop arm/painful arc and crossed arm adduction    Negative: Krebs's and Speed's    RIGHT SHOULDER  Inspection:    no swelling, bruising, discoloration, or obvious deformity or asymmetry  Palpation:    Tender about the anterior capsule and supraspinatus insertion. Remainder of bony and tendinous landmarks are nontender.    Crepitus is Absent  Active Range of Motion:     Abduction 1050 / FF 1350 /  / IR L4.    Strength:    Scapular plane abduction painful / ER grossly intact / IR grossly intact / biceps grossly intact / triceps grossly intact  Special Tests:    Positive: Neer's, Foley' and supraspinatus (empty can)    Negative: drop arm/painful arc, crossed arm adduction and Krebs's      CERVICAL SPINE  Inspection:    normal cervical lordosis present, rounded shoulders, forward head posture  Palpation:    Tender about the paracervical musculature (left), levator scapula (left), upper trapezius (left), lower trapezius (left), rhomboids (left), scapular spine and medial border of scapula. Otherwise remainder of the landmarks and nontender.  Range of Motion:     Flexion limited slightly by pain    Extension limited slightly by pain    Right side bend limited slightly by pain    Left side bend limited slightly by pain    Right rotation limited slightly by pain    Left rotation limited slightly by pain  Strength:    Full strength throughout all neck muscles, Deltoid (C5) weakness, triceps (C7) on left weakness  Special Tests:    Positive: None    Negative: Spurling's (bilateral), Melgoza's (bilateral)    RIGHT ELBOW  Inspection:  Mild soft tissue swelling over the mid ulnar region  Palpation:    Tender about the mid ulna. Remainder of bony, ligamentous and tendinous landmarks are nontender.    Crepitus is Absent  Range of Motion:     Extension full / flexion full / pronation full / supination full  Strength:    No deficits in flexion, extension, pronation, or  supination.  Special Tests:    Positive: none          Independent visualization of the below image:  Recent Results (from the past 24 hour(s))   XR Cervical Spine 2/3 vws    Narrative    CERVICAL SPINE TWO TO THREE VIEWS  3/3/2023 8:35 AM     HISTORY: Neck pain.    COMPARISON: None.      Impression    IMPRESSION: No gross vertebral body height loss identified.  Straightening of the normal cervical lordosis, which may be  positional. Alignment otherwise appears within normal limits. Mild to  moderate disc space narrowing at C4-C5 and mild disc space narrowing  at C5-C6 and C6-C7. Small multilevel marginal endplate osteophytes  anteriorly. Mild multilevel degenerative facet arthropathy. The  prevertebral soft tissues appear normal. Limited open-mouth odontoid  view due to radiographic overlap.   XR Forearm RT 2 vw    Narrative    No acute fracture,, dislocation or osseous abnormalities.  Localized soft   tissue swelling at the mid forearm.     Personal review of bilateral shoulder x-rays taken office today show no acute fracture, dislocation or significant osseous degenerative abnormalities.    Patient's conditions were thoroughly discussed during today's visit with total time spent face-to-face with the patient and documentation being 70 minutes.    Albert Yeo MD [facility]

## 2023-02-28 NOTE — RESULT ENCOUNTER NOTE
Dear Edwina,     All your recent labs are normal, improved, or pretty stable from previous.     Please, continue your current medications and/or supplements and follow up as we discussed at your last visit.     For additional lab test information, [email or web address] is an excellent reference.    Thank you so much for choosing [facility].  Please contact us with any questions that you may have.   We appreciate the opportunity to serve you now and look forward to supporting your healthcare needs for a long time to come!    Most Sincerely,     Phuong Story MD

## 2023-03-03 ENCOUNTER — ANCILLARY PROCEDURE (OUTPATIENT)
Dept: GENERAL RADIOLOGY | Facility: CLINIC | Age: 68
End: 2023-03-03
Attending: FAMILY MEDICINE
Payer: COMMERCIAL

## 2023-03-03 ENCOUNTER — OFFICE VISIT (OUTPATIENT)
Dept: ORTHOPEDICS | Facility: CLINIC | Age: 68
End: 2023-03-03
Attending: FAMILY MEDICINE
Payer: COMMERCIAL

## 2023-03-03 VITALS
DIASTOLIC BLOOD PRESSURE: 78 MMHG | BODY MASS INDEX: 32.47 KG/M2 | WEIGHT: 172 LBS | HEIGHT: 61 IN | SYSTOLIC BLOOD PRESSURE: 104 MMHG
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
## 2023-03-03 DIAGNOSIS — M25.512 CHRONIC PAIN OF BOTH SHOULDERS: ICD-10-CM

## 2023-03-03 DIAGNOSIS — G89.29 CHRONIC PAIN OF BOTH SHOULDERS: ICD-10-CM

## 2023-03-03 DIAGNOSIS — M75.42 IMPINGEMENT SYNDROME OF LEFT SHOULDER: ICD-10-CM

## 2023-03-03 DIAGNOSIS — M54.12 CERVICAL RADICULAR PAIN: ICD-10-CM

## 2023-03-03 DIAGNOSIS — S46.911A MUSCLE STRAIN OF RIGHT SCAPULAR REGION, INITIAL ENCOUNTER: ICD-10-CM

## 2023-03-03 DIAGNOSIS — S50.11XA CONTUSION OF RIGHT FOREARM, INITIAL ENCOUNTER: ICD-10-CM

## 2023-03-03 DIAGNOSIS — M79.7 FIBROMYALGIA: ICD-10-CM

## 2023-03-03 DIAGNOSIS — S16.1XXA STRAIN OF NECK MUSCLE, INITIAL ENCOUNTER: ICD-10-CM

## 2023-03-03 DIAGNOSIS — M54.2 NECK PAIN: Primary | ICD-10-CM
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
## 2023-03-03 DIAGNOSIS — M25.511 CHRONIC PAIN OF BOTH SHOULDERS: ICD-10-CM

## 2023-03-03 DIAGNOSIS — S46.912A MUSCLE STRAIN OF LEFT SCAPULAR REGION, INITIAL ENCOUNTER: ICD-10-CM

## 2023-03-03 DIAGNOSIS — M54.2 NECK PAIN: ICD-10-CM

## 2023-03-03 PROCEDURE — 99205 OFFICE O/P NEW HI 60 MIN: CPT | Performed by: FAMILY MEDICINE

## 2023-03-03 PROCEDURE — 73090 X-RAY EXAM OF FOREARM: CPT | Mod: RT | Performed by: FAMILY MEDICINE

## 2023-03-03 PROCEDURE — 73030 X-RAY EXAM OF SHOULDER: CPT | Mod: TC | Performed by: RADIOLOGY

## 2023-03-03 PROCEDURE — 72040 X-RAY EXAM NECK SPINE 2-3 VW: CPT | Mod: TC | Performed by: RADIOLOGY

## 2023-03-03 RX ORDER — METHYLPREDNISOLONE 4 MG
TABLET, DOSE PACK ORAL
Qty: 21 TABLET | Refills: 0 | Status: SHIPPED | OUTPATIENT
Start: 2023-03-03 | End: 2023-08-02

## 2023-03-03 NOTE — PATIENT INSTRUCTIONS
1. Neck pain    2. Chronic pain of both shoulders    3. Impingement syndrome of left shoulder    4. Strain of neck muscle, initial encounter    5. Muscle strain of left scapular region, initial encounter    6. Cervical radicular pain    7. Muscle strain of right scapular region, initial encounter    8. Fall, initial encounter    9. Contusion of right forearm, initial encounter    10. Fibromyalgia      -Patient has neck and bilateral shoulder pain and due to muscle strains and spasms as well as some contribution from her fibromyalgia  -Patient will start an oral steroid taper.  Patient will also start tizanidine 4 mg at nighttime  -Patient will follow up next Wednesday for reevaluation.  If patient continues to have significant muscle spasms, tenderness and impingement signs, will administer a left subacromial cortisone injection and trigger point injections of the left trapezius and around the shoulder blade  -Patient will then start formal physical therapy and home exercise program  -Patient may continue with massage and heating pad.  -Consider chiropractic treatments and acupuncture in the future  -Patient has a history of fibromyalgia which may be contributing to her chronic shoulder and upper back pains.  Patient was initially diagnosed with lupus and referred to rheumatology.  The rheumatologist ruled out lupus and diagnosed her with fibromyalgia.  Patient states that she was given some medications for the fibromyalgia which did not help and so she has not been taking anything for her chronic pain.  Patient has been trying to exercise which has helped.  Patient was informed that there are nonnarcotic pain medications that can help significantly with fibromyalgia especially when combined with regular exercise.  After we improve her acute pain and muscle spasms, we will try some chronic pain medications to ease her fibromyalgia pain.  I would recommend starting with Cymbalta  -At the end of the visit patient also  mentions recent fall 11 days ago landing on the right forearm causing persistent pain and deformity  -X-rays taken in office today show no acute fracture or dislocation.  Patient has soft tissue swelling and trauma  -Patient may ice the area.  The oral steroids and muscle relaxers should help with the soft tissue injury as well  -Call direct clinic number [[phone]] at any time with questions or concerns.    Albert Yeo MD [facility]  [facility]

## 2023-03-03 NOTE — LETTER
3/3/2023         RE: Edwina Zhou  [address] [facility] [id number]        Dear Colleague,    Thank you for referring your patient, Edwina Zhou, to the [facility] [place]. Please see a copy of my visit note below.    ASSESSMENT & PLAN  Patient Instructions     1. Neck pain    2. Chronic pain of both shoulders    3. Impingement syndrome of left shoulder    4. Strain of neck muscle, initial encounter    5. Muscle strain of left scapular region, initial encounter    6. Cervical radicular pain    7. Muscle strain of right scapular region, initial encounter    8. Fall, initial encounter    9. Contusion of right forearm, initial encounter    10. Fibromyalgia      -Patient has neck and bilateral shoulder pain and due to muscle strains and spasms as well as some contribution from her fibromyalgia  -Patient will start an oral steroid taper.  Patient will also start tizanidine 4 mg at nighttime  -Patient will follow up next Wednesday for reevaluation.  If patient continues to have significant muscle spasms, tenderness and impingement signs, will administer a left subacromial cortisone injection and trigger point injections of the left trapezius and around the shoulder blade  -Patient will then start formal physical therapy and home exercise program  -Patient may continue with massage and heating pad.  -Consider chiropractic treatments and acupuncture in the future  -Patient has a history of fibromyalgia which may be contributing to her chronic shoulder and upper back pains.  Patient was initially diagnosed with lupus and referred to rheumatology.  The rheumatologist ruled out lupus and diagnosed her with fibromyalgia.  Patient states that she was given some medications for the fibromyalgia which did not help and so she has not been taking anything for her chronic pain.  Patient has been trying to exercise which has helped.  Patient was informed that there are nonnarcotic pain  medications that can help significantly with fibromyalgia especially when combined with regular exercise.  After we improve her acute pain and muscle spasms, we will try some chronic pain medications to ease her fibromyalgia pain.  I would recommend starting with Cymbalta  -At the end of the visit patient also mentions recent fall 11 days ago landing on the right forearm causing persistent pain and deformity  -X-rays taken in office today show no acute fracture or dislocation.  Patient has soft tissue swelling and trauma  -Patient may ice the area.  The oral steroids and muscle relaxers should help with the soft tissue injury as well  -Call direct clinic number [[phone]] at any time with questions or concerns.    Albert Yeo MD [facility]  [facility]          -----    SUBJECTIVE  Edwina Zhou is a/an 67 year old Right handed female who is seen in consultation at the request of  Phuong Story M.D. for evaluation of bilateral shoulder pain. The patient is seen by themselves.    Onset: 9 month(s) ago, waxing and waning. Patient describes injury as patient fell and left posterior shoulder/scapula landed on doorstop  Location of Pain: bilateral scapula radiating to lateral shoulders, Notes constant neck pain and stiffness   Rating of Pain at worst: 9/10  Rating of Pain Currently: 7/10  Worsened by: sleeping, reaching high above her, lifting heavy objects, tender to palpation   Better with: heating pad   Treatments tried: ibuprofen, heating pad, massage   Associated symptoms: clicking with movement of shoulder, denies numbness/tinglging, notes weakness     Orthopedic history: YES - has fibromyalgia, rotator cuff injury with surgery   Relevant surgical history: YES - Date: left RTC repair ~5 years ago   Social history: works for [employer] in [employer]     Patient also notes last Monday, 11 days, she fell on her right arm and has a deformity on her lateral  "right forearm, which is tender to touch. She has not tried any treatment yet.     Past Medical History:   Diagnosis Date     Allergic rhinitis, cause unspecified      Arthritis      Dysphagia     had esophagram 12/07 - tertiary contractions of esoph with some retention, EGD showed gastritis, duodenitis and esophagitis ,pt didn't start a PPI       Fatigue      Fatigue      Gastroesophageal reflux disease      H/O YISEL I     s/p culpo with bx x 2 and cryotherapy - ok for paps q3yrs starting 2012     Helicobacter pylori (H. pylori) infection in 2009     Hyperlipidemia LDL goal < 130 02/04/2005    lipitor \"attacked my muscles\" (Hx fibromyalgia); [id number] failed to lower lipids enough&=myalgias also , lovastatin 20mg = myalgias      Hypothyroidism, unspecified type- needs lab follow up and ? 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     Comment: [employer]   Tobacco Use     Smoking status: Never     Smokeless tobacco: Never   Vaping Use     Vaping Use: Never used   Substance and Sexual Activity     Alcohol use: Yes     Comment: 0-2 glasses of wine weekly     Drug use: No     Sexual activity: Yes     Partners: Male     Birth control/protection: Post-menopausal     Comment:  to Niraj Zhou    Other Topics Concern      Service No     Blood Transfusions No     Caffeine Concern Yes     Comment: diet soda and tea occasionally      Exercise Yes     Comment: " "doing [place] Weight loss - horse back riding, [place] track, hiking      Seat Belt Yes     Comment: always      Self-Exams Yes     Comment: SBE encouraged monthly      Parent/sibling w/ CABG, MI or angioplasty before 65F 55M? No   Social History Narrative    Found 6  - 1/2 siblings from her birth mother Angelica Bess - 3 prior to pt's birth from 1 father , then pt from a different father, then 3 from 3rd father . She has since passed away.  From complications of dementia.  - pt found them through 23&me.  Pt's daughter Brielle is doing family genealogy.  No genetic markers for dementia or breast cancer in pt's 23&me.  Pt's adoptive parents had passed when pt did her 23&me.  ---Phuong Story MD  - February 13, 2023          Patient's past medical, surgical, social, and family histories were reviewed today and no changes are noted.    REVIEW OF SYSTEMS:  10 point ROS is negative other than symptoms noted above in HPI, Past Medical History or as stated below  Constitutional: NEGATIVE for fever, chills, change in weight  Skin: NEGATIVE for worrisome rashes, moles or lesions  GI/: NEGATIVE for bowel or bladder changes  Neuro: NEGATIVE for weakness, dizziness or paresthesias    OBJECTIVE:  /78   Ht 1.549 m (5' 1\")   Wt 78 kg (172 lb)   LMP 06/03/2010   BMI 32.50 kg/m     General: healthy, alert and in no distress  HEENT: no scleral icterus or conjunctival erythema  Skin: no suspicious lesions or rash. No jaundice.  CV: regular rhythm by palpation  Resp: normal respiratory effort without conversational dyspnea   Psych: normal mood and affect  Gait: normal steady gait with appropriate coordination and balance  Neuro: normal light touch sensory exam of the bilateral upper extremities.    MSK:  LEFT SHOULDER  Inspection:    no swelling, bruising, discoloration, or obvious deformity or asymmetry  Palpation:    Tender about the infraspinatus insertion, upper trapezius region and rhomboids. Remainder of bony " and tendinous landmarks are nontender.  Active Range of Motion:     Abduction 1050, FF 1350, , IR L4.      Scapular dyskinesis absent  Strength:    Scapular plane abduction painful,  ER weakness, IR grossly intact  Special Tests:    Positive: Neer's, Foley', supraspinatus (empty can), drop arm/painful arc and crossed arm adduction    Negative: Tonalea's and Speed's    RIGHT SHOULDER  Inspection:    no swelling, bruising, discoloration, or obvious deformity or asymmetry  Palpation:    Tender about the anterior capsule and supraspinatus insertion. Remainder of bony and tendinous landmarks are nontender.    Crepitus is Absent  Active Range of Motion:     Abduction 1050 / FF 1350 /  / IR L4.    Strength:    Scapular plane abduction painful / ER grossly intact / IR grossly intact / biceps grossly intact / triceps grossly intact  Special Tests:    Positive: Neer's, Foley' and supraspinatus (empty can)    Negative: drop arm/painful arc, crossed arm adduction and Tonalea's      CERVICAL SPINE  Inspection:    normal cervical lordosis present, rounded shoulders, forward head posture  Palpation:    Tender about the paracervical musculature (left), levator scapula (left), upper trapezius (left), lower trapezius (left), rhomboids (left), scapular spine and medial border of scapula. Otherwise remainder of the landmarks and nontender.  Range of Motion:     Flexion limited slightly by pain    Extension limited slightly by pain    Right side bend limited slightly by pain    Left side bend limited slightly by pain    Right rotation limited slightly by pain    Left rotation limited slightly by pain  Strength:    Full strength throughout all neck muscles, Deltoid (C5) weakness, triceps (C7) on left weakness  Special Tests:    Positive: None    Negative: Spurling's (bilateral), Melgoza's (bilateral)    RIGHT ELBOW  Inspection:  Mild soft tissue swelling over the mid ulnar region  Palpation:    Tender about the mid ulna.  Remainder of bony, ligamentous and tendinous landmarks are nontender.    Crepitus is Absent  Range of Motion:     Extension full / flexion full / pronation full / supination full  Strength:    No deficits in flexion, extension, pronation, or supination.  Special Tests:    Positive: none          Independent visualization of the below image:  Recent Results (from the past 24 hour(s))   XR Cervical Spine 2/3 vws    Narrative    CERVICAL SPINE TWO TO THREE VIEWS  3/3/2023 8:35 AM     HISTORY: Neck pain.    COMPARISON: None.      Impression    IMPRESSION: No gross vertebral body height loss identified.  Straightening of the normal cervical lordosis, which may be  positional. Alignment otherwise appears within normal limits. Mild to  moderate disc space narrowing at C4-C5 and mild disc space narrowing  at C5-C6 and C6-C7. Small multilevel marginal endplate osteophytes  anteriorly. Mild multilevel degenerative facet arthropathy. The  prevertebral soft tissues appear normal. Limited open-mouth odontoid  view due to radiographic overlap.   XR Forearm RT 2 vw    Narrative    No acute fracture,, dislocation or osseous abnormalities.  Localized soft   tissue swelling at the mid forearm.     Personal review of bilateral shoulder x-rays taken office today show no acute fracture, dislocation or significant osseous degenerative abnormalities.    Patient's conditions were thoroughly discussed during today's visit with total time spent face-to-face with the patient and documentation being 70 minutes.    Albert Yeo MD [facility]        Again, thank you for allowing me to participate in the care of your patient.        Sincerely,        Albert Yeo, MD

## 2023-03-06 ENCOUNTER — OFFICE VISIT (OUTPATIENT)
Dept: ORTHOPEDICS | Facility: CLINIC | Age: 68
End: 2023-03-06
Attending: FAMILY MEDICINE
Payer: COMMERCIAL

## 2023-03-06 ENCOUNTER — ANCILLARY PROCEDURE (OUTPATIENT)
Dept: GENERAL RADIOLOGY | Facility: CLINIC | Age: 68
End: 2023-03-06
Attending: STUDENT IN AN ORGANIZED HEALTH CARE EDUCATION/TRAINING PROGRAM
Payer: COMMERCIAL

## 2023-03-06 VITALS — BODY MASS INDEX: 32.47 KG/M2 | WEIGHT: 172 LBS | HEIGHT: 61 IN

## 2023-03-06 DIAGNOSIS — M25.562 CHRONIC PAIN OF BOTH KNEES: ICD-10-CM

## 2023-03-06 DIAGNOSIS — G89.29 CHRONIC PAIN OF BOTH KNEES: Primary | ICD-10-CM

## 2023-03-06 DIAGNOSIS — M17.0 PRIMARY OSTEOARTHRITIS OF BOTH KNEES: ICD-10-CM

## 2023-03-06 DIAGNOSIS — G89.29 CHRONIC PAIN OF BOTH KNEES: ICD-10-CM

## 2023-03-06 DIAGNOSIS — M25.561 CHRONIC PAIN OF BOTH KNEES: Primary | ICD-10-CM

## 2023-03-06 DIAGNOSIS — M25.561 CHRONIC PAIN OF BOTH KNEES: ICD-10-CM

## 2023-03-06 DIAGNOSIS — M25.562 CHRONIC PAIN OF BOTH KNEES: Primary | ICD-10-CM

## 2023-03-06 PROCEDURE — 73562 X-RAY EXAM OF KNEE 3: CPT | Mod: TC | Performed by: RADIOLOGY

## 2023-03-06 PROCEDURE — 99214 OFFICE O/P EST MOD 30 MIN: CPT | Performed by: STUDENT IN AN ORGANIZED HEALTH CARE EDUCATION/TRAINING PROGRAM

## 2023-03-06 NOTE — LETTER
"    3/6/2023         RE: Edwina Zhou  [address] [facility] [address]        Dear Colleague,    Thank you for referring your patient, Edwina Zhou, to the [facility] [place]. Please see a copy of my visit note below.    ASSESSMENT & PLAN       1. Chronic pain of both knees    2. Primary osteoarthritis of both knees    3. BMI 32.0-32.9,adult      Edwina Zhou is a 67 year old female presenting for evaluation of chronic bilateral (right>left) knee pain s/p distant arthroscopy in 2010 for degenerative meniscus tears. History, examination and imaging findings were reviewed today, consistent with moderate osteoarthritis of both knees (R>L). We thoroughly discussed treatment options for knee arthritis including activity modifications, compression/bracing, heat/ice, physical therapy, medications (topicals, NSAIDS, acetaminophen), injections (cortisone for flares, hyaluronic acid \"gel\" or PRP for maintenance), or referral to Orthopedic Surgery for consideration of joint replacement. Upon discussion, patient has elected to proceed with the following plan:    - Physical therapy for knee strengthening and stabilization, hip/core strengthening and biomechanic assessment. Please call [phone] to schedule your appointment with [facility]. If there is not an option close enough to you, we can fax the referral to any other group. Please let me know (ie via [employer]).     - Heat and compression can be very helpful for arthritis. Ice can be used 10-15 minutes 3-4 times per day as needed for arthritis flares.     Over-the-counter options to try:   - Turmeric with black pepper 500-1000 mg twice daily. This is an herbal anti-inflammatory supplement that has been found to be equally effective as Ibuprofen for treatment of knee arthritis pain and inflammation.   - Glucosamine/chondroitin or fish oil can also be helpful for management of arthritis pain.  - Voltaren " "(diclofenac) gel is a topical NSAID medication (like ibuprofen) available over-the-counter that can be very effective for arthritis pain and inflammation. This may be applied to the painful joint 4 times per day.  - Tylenol, 2 tablets (1,000 mg) three times per day, not to exceed 3,000 mg per day.    - Options for injections: Cortisone (for flares), hyaluronic acid (\"gel shots\" for maintenance), PRP (platelet rich plasma for maintenance).     - We discussed the importance of lifestyle behaviors including maintaining a healthy weight, anti-inflammatory diet and activity modifications (avoiding repetitive deep knee flexion and high impact exercises).  - A referral has been placed for [facility] clinic to assist with weight loss. Please call to schedule.     - If pain/function not well controlled with above, consider referral to Orthopedic Surgery to discuss joint replacement.     Please schedule a follow up appointment to see me in 6-8 weeks, or sooner as needed for persistence or worsening of pain. Please schedule this as a 40 minute \"injection\" visit. You may call our direct clinic number ([phone]) at any time with questions or concerns.    Chantel Rangel MD, [facility]          -----    SUBJECTIVE  Edwina Zhou is a/an 67 year old female with history of fibromyalgia who is seen in consultation at the request of  Phuong Story M.D. for evaluation of bilateral knee pain (R>L). The patient is seen by themselves.    Onset: Years with pain gradually getting worse. Reports insidious onset without acute precipitating event. Patient reports a fall in June 2022 which did exacerbate symptoms.  Location of Pain: right anterior, medial and lateral knee, left anterior knee  Rating of Pain at worst: 8/10  Rating of Pain Currently: 3/10  Worsened by: prolonged hiking, prolonged walking, increased activity, patient does note intermittent pain at " "night  Better with: rest / activity avoidance  Treatments tried: rest/activity avoidance, magnesium cream, possible previous injections - unsure type  Associated symptoms: right knee - swelling, locking or catching and feeling of instability  Orthopedic history: YES - h/o chronic bilateral knee pain, fibromyalgia, neck pain, chronic bilateral shoulder  Relevant surgical history: YES - right knee arthroscopy  Social history: works - pastoral care worker at [employer]    Past Medical History:   Diagnosis Date     Allergic rhinitis, cause unspecified      Arthritis      Dysphagia     had esophagram 12/07 - tertiary contractions of esoph with some retention, EGD showed gastritis, duodenitis and esophagitis ,pt didn't start a PPI       Fatigue      Fatigue      Gastroesophageal reflux disease      H/O YISEL I     s/p culpo with bx x 2 and cryotherapy - ok for paps q3yrs starting 2012     Helicobacter pylori (H. pylori) infection in 2009     Hyperlipidemia LDL goal < 130 02/04/2005    lipitor \"attacked my muscles\" (Hx fibromyalgia); [id number] failed to lower lipids enough&=myalgias also , lovastatin 20mg = myalgias      Hypothyroidism, unspecified type- needs lab follow up and ? 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     Comment: [employer]   Tobacco Use     Smoking status: Never     Smokeless tobacco: Never   Vaping Use     Vaping Use: Never used   Substance and " "Sexual Activity     Alcohol use: Yes     Comment: 0-2 glasses of wine weekly     Drug use: No     Sexual activity: Yes     Partners: Male     Birth control/protection: Post-menopausal     Comment:  to Niraj Zhou    Other Topics Concern      Service No     Blood Transfusions No     Caffeine Concern Yes     Comment: diet soda and tea occasionally      Exercise Yes     Comment: doing [place] Weight loss - horse back riding, [place] track, hiking      Seat Belt Yes     Comment: always      Self-Exams Yes     Comment: SBE encouraged monthly      Parent/sibling w/ CABG, MI or angioplasty before 65F 55M? No   Social History Narrative    Found 6  - 1/2 siblings from her birth mother Angelica Bess - 3 prior to pt's birth from 1 father , then pt from a different father, then 3 from 3rd father . She has since passed away.  From complications of dementia.  - pt found them through 23&me.  Pt's daughter Brielle is doing family genealogy.  No genetic markers for dementia or breast cancer in pt's 23&me.  Pt's adoptive parents had passed when pt did her 23&me.  ---Phuong Story MD  - February 13, 2023          Patient's past medical, surgical, social, and family histories were reviewed today and no changes are noted.    REVIEW OF SYSTEMS:  10 point ROS is negative other than symptoms noted above in HPI, Past Medical History or as stated below  Constitutional: NEGATIVE for fever, chills, change in weight  Skin: NEGATIVE for worrisome rashes, moles or lesions  GI/: NEGATIVE for bowel or bladder changes  Neuro: NEGATIVE for weakness, dizziness or paresthesias    OBJECTIVE:  Ht 1.549 m (5' 1\")   Wt 78 kg (172 lb)   LMP 06/03/2010   BMI 32.50 kg/m     General: healthy, alert and in no distress  HEENT: no scleral icterus or conjunctival erythema  Skin: no suspicious lesions or rash. No jaundice.  CV: no pedal edema  Resp: normal respiratory effort without conversational dyspnea   Psych: normal mood and " affect  Gait: steady gait with appropriate coordination and balance  Neuro: Normal light sensory exam of lower extremity  MSK:  BILATERAL KNEE  Inspection:    Normal alignment  Palpation:    Tender about the lateral patellar facet, medial patellar facet, lateral joint line and medial joint line. Remainder of bony and ligamentous landmarks are nontender.    Small effusion is present (R)    Patellofemoral crepitus is Present (R)  Range of Motion:     00 extension to 1200 flexion - pain at end flexion  Strength:    Quadriceps 5/5 and hamstrings 5/5    Extensor mechanism intact  Special Tests:    Positive: Patellar grind, painful Allyn's without click    Negative: Patellar apprehension, MCL/valgus stress (0 & 30 deg), LCL/varus stress (0 & 30 deg), Lachman's, anterior drawer, posterior drawer, posterior sag    Independent visualization of the below image:  Recent Results (from the past 24 hour(s))   XR Knee Bilateral 3 vw    Narrative    XR KNEE BILATERAL 3 VIEWS 3/6/2023 8:54 AM     HISTORY: Primary osteoarthritis of both knees; Chronic pain of both  knees; Chronic pain of both knees; Chronic pain of both knees    COMPARISON: None.         Impression    IMPRESSION: Degenerative narrowing of the medial compartments of both  knees, greater on the right. Both knees negative for fracture. No  significant effusion on either side. Chronic enthesitis superior pole  of the patella bilaterally.    LANIE BO MD         SYSTEM ID:  [id number]       MR LEFT KNEE Apr 6, 2010 6:22 PM   IMPRESSION:   1. 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 It is difficult to exclude an inferior surface tear in this   region.   2. Mild articular cartilage thinning and irregularity in the   weight-bearing medial compartment.   3. Mild patella baja.      MRI RIGHT KNEE Nov 12, 2010 1:18 PM     IMPRESSION:   1. Ill-defined degenerative tear posterior horn medial meniscus   reaching the inferior articular  surface.   2. Findings suspicious for small horizontally oriented tear, body of   lateral meniscus.   3. Mild chondromalacia patella.   4. Small joint effusion.   5. Small Danielle's cyst.       Chantel Rangel MD, [facility]        Again, thank you for allowing me to participate in the care of your patient.        Sincerely,        Chantel Rangel MD

## 2023-03-06 NOTE — PATIENT INSTRUCTIONS
"1. Chronic pain of both knees    2. Primary osteoarthritis of both knees    3. BMI 32.0-32.9,adult      Edwina Zhou is a 67 year old female presenting for evaluation of chronic bilateral (right>left) knee pain s/p distant arthroscopy in 2010 for degenerative meniscus tears. History, examination and imaging findings were reviewed today, consistent with moderate osteoarthritis of both knees (R>L). We thoroughly discussed treatment options for knee arthritis including activity modifications, compression/bracing, heat/ice, physical therapy, medications (topicals, NSAIDS, acetaminophen), injections (cortisone for flares, hyaluronic acid \"gel\" or PRP for maintenance), or referral to Orthopedic Surgery for consideration of joint replacement. Upon discussion, patient has elected to proceed with the following plan:    - Physical therapy for knee strengthening and stabilization, hip/core strengthening and biomechanic assessment. Please call [phone] to schedule your appointment with [facility]. If there is not an option close enough to you, we can fax the referral to any other group. Please let me know (ie via [employer]).     - Heat and compression can be very helpful for arthritis. Ice can be used 10-15 minutes 3-4 times per day as needed for arthritis flares.     Over-the-counter options to try:   - Turmeric with black pepper 500-1000 mg twice daily. This is an herbal anti-inflammatory supplement that has been found to be equally effective as Ibuprofen for treatment of knee arthritis pain and inflammation.   - Glucosamine/chondroitin or fish oil can also be helpful for management of arthritis pain.  - Voltaren (diclofenac) gel is a topical NSAID medication (like ibuprofen) available over-the-counter that can be very effective for arthritis pain and inflammation. This may be applied to the painful joint 4 times per day.  - Tylenol, 2 tablets (1,000 mg) three times per day, not to exceed 3,000 mg per " "day.    - Options for injections: Cortisone (for flares), hyaluronic acid (\"gel shots\" for maintenance), PRP (platelet rich plasma for maintenance).     - We discussed the importance of lifestyle behaviors including maintaining a healthy weight, anti-inflammatory diet and activity modifications (avoiding repetitive deep knee flexion and high impact exercises).  - A referral has been placed for [facility] clinic to assist with weight loss. Please call to schedule.     - If pain/function not well controlled with above, consider referral to Orthopedic Surgery to discuss joint replacement.     Please schedule a follow up appointment to see me in 6-8 weeks, or sooner as needed for persistence or worsening of pain. Please schedule this as a 40 minute \"injection\" visit. You may call our direct clinic number ([phone]) at any time with questions or concerns.    Chantel Rangel MD, CAQSM  [facility]        "

## 2023-03-06 NOTE — PROGRESS NOTES
"ASSESSMENT & PLAN       1. Chronic pain of both knees    2. Primary osteoarthritis of both knees    3. BMI 32.0-32.9,adult      Edwina Zhou is a 67 year old female presenting for evaluation of chronic bilateral (right>left) knee pain s/p distant arthroscopy in 2010 for degenerative meniscus tears. History, examination and imaging findings were reviewed today, consistent with moderate osteoarthritis of both knees (R>L). We thoroughly discussed treatment options for knee arthritis including activity modifications, compression/bracing, heat/ice, physical therapy, medications (topicals, NSAIDS, acetaminophen), injections (cortisone for flares, hyaluronic acid \"gel\" or PRP for maintenance), or referral to Orthopedic Surgery for consideration of joint replacement. Upon discussion, patient has elected to proceed with the following plan:    - Physical therapy for knee strengthening and stabilization, hip/core strengthening and biomechanic assessment. Please call [phone] to schedule your appointment with [facility]. If there is not an option close enough to you, we can fax the referral to any other group. Please let me know (ie via [employer]).     - Heat and compression can be very helpful for arthritis. Ice can be used 10-15 minutes 3-4 times per day as needed for arthritis flares.     Over-the-counter options to try:   - Turmeric with black pepper 500-1000 mg twice daily. This is an herbal anti-inflammatory supplement that has been found to be equally effective as Ibuprofen for treatment of knee arthritis pain and inflammation.   - Glucosamine/chondroitin or fish oil can also be helpful for management of arthritis pain.  - Voltaren (diclofenac) gel is a topical NSAID medication (like ibuprofen) available over-the-counter that can be very effective for arthritis pain and inflammation. This may be applied to the painful joint 4 times per day.  - Tylenol, 2 tablets (1,000 mg) three times per day, " "not to exceed 3,000 mg per day.    - Options for injections: Cortisone (for flares), hyaluronic acid (\"gel shots\" for maintenance), PRP (platelet rich plasma for maintenance).     - We discussed the importance of lifestyle behaviors including maintaining a healthy weight, anti-inflammatory diet and activity modifications (avoiding repetitive deep knee flexion and high impact exercises).  - A referral has been placed for [facility] clinic to assist with weight loss. Please call to schedule.     - If pain/function not well controlled with above, consider referral to Orthopedic Surgery to discuss joint replacement.     Please schedule a follow up appointment to see me in 6-8 weeks, or sooner as needed for persistence or worsening of pain. Please schedule this as a 40 minute \"injection\" visit. You may call our direct clinic number ([phone]) at any time with questions or concerns.    Chantel Rangel MD, [facility]          -----    SUBJECTIVE  Edwina Zhou is a/an 67 year old female with history of fibromyalgia who is seen in consultation at the request of  Phuong Story M.D. for evaluation of bilateral knee pain (R>L). The patient is seen by themselves.    Onset: Years with pain gradually getting worse. Reports insidious onset without acute precipitating event. Patient reports a fall in June 2022 which did exacerbate symptoms.  Location of Pain: right anterior, medial and lateral knee, left anterior knee  Rating of Pain at worst: 8/10  Rating of Pain Currently: 3/10  Worsened by: prolonged hiking, prolonged walking, increased activity, patient does note intermittent pain at night  Better with: rest / activity avoidance  Treatments tried: rest/activity avoidance, magnesium cream, possible previous injections - unsure type  Associated symptoms: right knee - swelling, locking or catching and feeling of instability  Orthopedic history: YES - " "h/o chronic bilateral knee pain, fibromyalgia, neck pain, chronic bilateral shoulder  Relevant surgical history: YES - right knee arthroscopy  Social history: works - pastoral care worker at [employer]    Past Medical History:   Diagnosis Date     Allergic rhinitis, cause unspecified      Arthritis      Dysphagia     had esophagram 12/07 - tertiary contractions of esoph with some retention, EGD showed gastritis, duodenitis and esophagitis ,pt didn't start a PPI       Fatigue      Fatigue      Gastroesophageal reflux disease      H/O YISEL I     s/p culpo with bx x 2 and cryotherapy - ok for paps q3yrs starting 2012     Helicobacter pylori (H. pylori) infection in 2009     Hyperlipidemia LDL goal < 130 02/04/2005    lipitor \"attacked my muscles\" (Hx fibromyalgia); [id number] failed to lower lipids enough&=myalgias also , lovastatin 20mg = myalgias      Hypothyroidism, unspecified type- needs lab follow up and ? 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     Comment: [employer]   Tobacco Use     Smoking status: Never     Smokeless tobacco: Never   Vaping Use     Vaping Use: Never used   Substance and Sexual Activity     Alcohol use: Yes     Comment: 0-2 glasses of wine weekly     Drug use: No     Sexual activity: Yes     Partners: Male     Birth control/protection: Post-menopausal     Comment:  to Niraj Zhou    Other Topics Concern      Service No     " "Blood Transfusions No     Caffeine Concern Yes     Comment: diet soda and tea occasionally      Exercise Yes     Comment: doing [place] Weight loss - horse back riding, [place] track, hiking      Seat Belt Yes     Comment: always      Self-Exams Yes     Comment: SBE encouraged monthly      Parent/sibling w/ CABG, MI or angioplasty before 65F 55M? No   Social History Narrative    Found 6  - 1/2 siblings from her birth mother Angelica Bess - 3 prior to pt's birth from 1 father , then pt from a different father, then 3 from 3rd father . She has since passed away.  From complications of dementia.  - pt found them through 23&me.  Pt's daughter Brielle is doing family genealogy.  No genetic markers for dementia or breast cancer in pt's 23&me.  Pt's adoptive parents had passed when pt did her 23&me.  ---Phuong Story MD  - February 13, 2023          Patient's past medical, surgical, social, and family histories were reviewed today and no changes are noted.    REVIEW OF SYSTEMS:  10 point ROS is negative other than symptoms noted above in HPI, Past Medical History or as stated below  Constitutional: NEGATIVE for fever, chills, change in weight  Skin: NEGATIVE for worrisome rashes, moles or lesions  GI/: NEGATIVE for bowel or bladder changes  Neuro: NEGATIVE for weakness, dizziness or paresthesias    OBJECTIVE:  Ht 1.549 m (5' 1\")   Wt 78 kg (172 lb)   LMP 06/03/2010   BMI 32.50 kg/m     General: healthy, alert and in no distress  HEENT: no scleral icterus or conjunctival erythema  Skin: no suspicious lesions or rash. No jaundice.  CV: no pedal edema  Resp: normal respiratory effort without conversational dyspnea   Psych: normal mood and affect  Gait: steady gait with appropriate coordination and balance  Neuro: Normal light sensory exam of lower extremity  MSK:  BILATERAL KNEE  Inspection:    Normal alignment  Palpation:    Tender about the lateral patellar facet, medial patellar facet, lateral joint line and " medial joint line. Remainder of bony and ligamentous landmarks are nontender.    Small effusion is present (R)    Patellofemoral crepitus is Present (R)  Range of Motion:     00 extension to 1200 flexion - pain at end flexion  Strength:    Quadriceps 5/5 and hamstrings 5/5    Extensor mechanism intact  Special Tests:    Positive: Patellar grind, painful Allyn's without click    Negative: Patellar apprehension, MCL/valgus stress (0 & 30 deg), LCL/varus stress (0 & 30 deg), Lachman's, anterior drawer, posterior drawer, posterior sag    Independent visualization of the below image:  Recent Results (from the past 24 hour(s))   XR Knee Bilateral 3 vw    Narrative    XR KNEE BILATERAL 3 VIEWS 3/6/2023 8:54 AM     HISTORY: Primary osteoarthritis of both knees; Chronic pain of both  knees; Chronic pain of both knees; Chronic pain of both knees    COMPARISON: None.         Impression    IMPRESSION: Degenerative narrowing of the medial compartments of both  knees, greater on the right. Both knees negative for fracture. No  significant effusion on either side. Chronic enthesitis superior pole  of the patella bilaterally.    LANIE BO MD         SYSTEM ID:  [id number]       MR LEFT KNEE Apr 6, 2010 6:22 PM   IMPRESSION:   1. 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 It is difficult to exclude an inferior surface tear in this   region.   2. Mild articular cartilage thinning and irregularity in the   weight-bearing medial compartment.   3. Mild patella baja.      MRI RIGHT KNEE Nov 12, 2010 1:18 PM     IMPRESSION:   1. Ill-defined degenerative tear posterior horn medial meniscus   reaching the inferior articular surface.   2. Findings suspicious for small horizontally oriented tear, body of   lateral meniscus.   3. Mild chondromalacia patella.   4. Small joint effusion.   5. Small Danielle's cyst.       Chantel Rangel MD, [facility]

## 2023-03-08 ENCOUNTER — ANCILLARY PROCEDURE (OUTPATIENT)
Dept: GENERAL RADIOLOGY | Facility: CLINIC | Age: 68
End: 2023-03-08
Attending: PODIATRIST
Payer: COMMERCIAL

## 2023-03-08 ENCOUNTER — OFFICE VISIT (OUTPATIENT)
Dept: PODIATRY | Facility: CLINIC | Age: 68
End: 2023-03-08
Attending: FAMILY MEDICINE
Payer: COMMERCIAL

## 2023-03-08 ENCOUNTER — OFFICE VISIT (OUTPATIENT)
Dept: ORTHOPEDICS | Facility: CLINIC | Age: 68
End: 2023-03-08
Payer: COMMERCIAL

## 2023-03-08 VITALS
DIASTOLIC BLOOD PRESSURE: 78 MMHG | HEIGHT: 61 IN | SYSTOLIC BLOOD PRESSURE: 104 MMHG | BODY MASS INDEX: 32.47 KG/M2 | WEIGHT: 172 LBS

## 2023-03-08 VITALS — SYSTOLIC BLOOD PRESSURE: 104 MMHG | WEIGHT: 172 LBS | DIASTOLIC BLOOD PRESSURE: 78 MMHG | BODY MASS INDEX: 32.5 KG/M2

## 2023-03-08 DIAGNOSIS — M77.42 METATARSALGIA OF BOTH FEET: ICD-10-CM

## 2023-03-08 DIAGNOSIS — M77.52 CAPSULITIS OF METATARSOPHALANGEAL (MTP) JOINTS OF BOTH FEET: ICD-10-CM

## 2023-03-08 DIAGNOSIS — M54.12 CERVICAL RADICULAR PAIN: ICD-10-CM

## 2023-03-08 DIAGNOSIS — F41.8 ANXIETY WITH SOMATIC FEATURES: ICD-10-CM

## 2023-03-08 DIAGNOSIS — S46.912D MUSCLE STRAIN OF LEFT SCAPULAR REGION, SUBSEQUENT ENCOUNTER: ICD-10-CM

## 2023-03-08 DIAGNOSIS — M79.671 BILATERAL FOOT PAIN: Primary | ICD-10-CM

## 2023-03-08 DIAGNOSIS — M79.672 BILATERAL FOOT PAIN: ICD-10-CM

## 2023-03-08 DIAGNOSIS — M79.671 BILATERAL FOOT PAIN: ICD-10-CM

## 2023-03-08 DIAGNOSIS — S16.1XXD STRAIN OF NECK MUSCLE, SUBSEQUENT ENCOUNTER: ICD-10-CM

## 2023-03-08 DIAGNOSIS — M75.42 IMPINGEMENT SYNDROME OF LEFT SHOULDER: Primary | ICD-10-CM

## 2023-03-08 DIAGNOSIS — M77.41 METATARSALGIA OF BOTH FEET: ICD-10-CM

## 2023-03-08 DIAGNOSIS — Q66.70 PES CAVUS: ICD-10-CM

## 2023-03-08 DIAGNOSIS — G57.63 MORTON'S METATARSALGIA, NEURALGIA, OR NEUROMA, BILATERAL: ICD-10-CM

## 2023-03-08 DIAGNOSIS — S46.911D MUSCLE STRAIN OF RIGHT SCAPULAR REGION, SUBSEQUENT ENCOUNTER: ICD-10-CM

## 2023-03-08 DIAGNOSIS — M79.7 FIBROMYALGIA: ICD-10-CM

## 2023-03-08 DIAGNOSIS — M77.51 CAPSULITIS OF METATARSOPHALANGEAL (MTP) JOINTS OF BOTH FEET: ICD-10-CM

## 2023-03-08 DIAGNOSIS — M79.672 BILATERAL FOOT PAIN: Primary | ICD-10-CM

## 2023-03-08 PROCEDURE — 99214 OFFICE O/P EST MOD 30 MIN: CPT | Mod: 25 | Performed by: FAMILY MEDICINE

## 2023-03-08 PROCEDURE — 20610 DRAIN/INJ JOINT/BURSA W/O US: CPT | Mod: LT | Performed by: FAMILY MEDICINE

## 2023-03-08 PROCEDURE — 99243 OFF/OP CNSLTJ NEW/EST LOW 30: CPT | Performed by: PODIATRIST

## 2023-03-08 PROCEDURE — 73630 X-RAY EXAM OF FOOT: CPT | Mod: TC | Performed by: RADIOLOGY

## 2023-03-08 RX ORDER — METHYLPREDNISOLONE ACETATE 40 MG/ML
40 INJECTION, SUSPENSION INTRA-ARTICULAR; INTRALESIONAL; INTRAMUSCULAR; SOFT TISSUE
Status: SHIPPED | OUTPATIENT
Start: 2023-03-08

## 2023-03-08 RX ORDER — DULOXETIN HYDROCHLORIDE 30 MG/1
CAPSULE, DELAYED RELEASE ORAL
Qty: 60 CAPSULE | Refills: 2 | Status: SHIPPED | OUTPATIENT
Start: 2023-03-08 | End: 2023-05-10

## 2023-03-08 RX ADMIN — METHYLPREDNISOLONE ACETATE 40 MG: 40 INJECTION, SUSPENSION INTRA-ARTICULAR; INTRALESIONAL; INTRAMUSCULAR; SOFT TISSUE at 10:18

## 2023-03-08 RX ADMIN — METHYLPREDNISOLONE ACETATE 40 MG: 40 INJECTION, SUSPENSION INTRA-ARTICULAR; INTRALESIONAL; INTRAMUSCULAR; SOFT TISSUE at 10:19

## 2023-03-08 NOTE — LETTER
3/8/2023         RE: Edwina Zhou  [address] [facility] [address]        Dear Colleague,    Thank you for referring your patient, Edwina Zhou, to the [facility] [place]. Please see a copy of my visit note below.    ASSESSMENT & PLAN  Patient Instructions     1. Impingement syndrome of left shoulder    2. Strain of neck muscle, subsequent encounter    3. Muscle strain of left scapular region, subsequent encounter    4. Cervical radicular pain    5. Muscle strain of right scapular region, subsequent encounter    6. Fibromyalgia    7. Anxiety with somatic features      -Patient is following up for chronic neck and upper back pain due to muscle strains, left shoulder pain due to impingement and new onset right shoulder and upper back pain due to muscle spasms.  Patient also has chronic musculoskeletal pains due to fibromyalgia as well as chronic stress and anxiety exacerbating all of her symptoms   -Patient has some sort of underlying trigger or mental health issue causing her to self sabotage and not allow her to successfully keep the weight off  -Patient states that for years she has been persistently pursuing ways to manage her stress through exercise, yoga, meditation without success.  Patient has repeatedly dieted and lost weight but will always overeat and regain all the weight when she is close to her goals and does not know why.  -Patient is already referred to the [place] physician directed weight loss program which should be helpful.  -However, patient has underlying stress, anxiety and mental health issues that have been neglected for years that is likely a major cause for many of her physical ailments  -Patient should contact her primary care physician for referral either to a therapist and or psychiatrist for further evaluation and treatment  -Patient will also start Cymbalta 30 mg 1 tablet daily for 1 week and then 2 tablets daily.  Studies have shown that  "60 mg of Cymbalta daily has significantly improved musculoskeletal pain from fibromyalgia after 2 to 3 months.  -Patient will follow up in 6 weeks for reevaluation.  Patient may follow-up in 2 weeks if right shoulder blade and upper back pain is persistent for potential trigger point injections on that side  -Call direct clinic number [[phone]] at any time with questions or concerns.    Albert Yeo MD [facility] and [facility] Medicine  [facility]          -----    SUBJECTIVE:  Edwina Zhou is a 67 year old female who is seen in follow-up for bilateral shoulder pain.They were last seen 3/3/2023 .     Since their last visit reports 10% improvement. They indicate that their current pain level is 5/10. They have tried Medrol Dosepak (just finished today) and Tizanidine    The patient is seen by themselves.    Patient's past medical, surgical, social, and family histories were reviewed today and no changes are noted.    REVIEW OF SYSTEMS:  Constitutional: NEGATIVE for fever, chills, change in weight  Skin: NEGATIVE for worrisome rashes, moles or lesions  GI/: NEGATIVE for bowel or bladder changes  Neuro: NEGATIVE for weakness, dizziness or paresthesias    OBJECTIVE:  /78   Ht 1.549 m (5' 1\")   Wt 78 kg (172 lb)   LMP 06/03/2010   BMI 32.50 kg/m     General: healthy, alert and in no distress  HEENT: no scleral icterus or conjunctival erythema  Skin: no suspicious lesions or rash. No jaundice.  CV: regular rhythm by palpation, no pedal edema  Resp: normal respiratory effort without conversational dyspnea   Psych: normal mood and affect  Gait: normal steady gait with appropriate coordination and balance  Neuro: normal light touch sensory exam of the extremities.    MSK:  LEFT SHOULDER  Inspection:    no swelling, bruising, discoloration, or obvious deformity or asymmetry  Palpation:    Tender about the infraspinatus insertion, upper trapezius region and rhomboids. Remainder of " bony and tendinous landmarks are nontender.  Active Range of Motion:     Abduction 1050, FF 1350, , IR L4.      Scapular dyskinesis absent  Strength:    Scapular plane abduction painful,  ER weakness, IR grossly intact  Special Tests:    Positive: Neer's, Foley', supraspinatus (empty can), drop arm/painful arc and crossed arm adduction    Negative: [place]'s and Speed's     CERVICAL SPINE  Inspection:    normal cervical lordosis present, rounded shoulders, forward head posture  Palpation:    Tender about the paracervical musculature (left), levator scapula (left), upper trapezius (left), lower trapezius (left), rhomboids (left), scapular spine and medial border of scapula. Otherwise remainder of the landmarks and nontender.  Range of Motion:     Flexion limited slightly by pain    Extension limited slightly by pain    Right side bend limited slightly by pain    Left side bend limited slightly by pain    Right rotation limited slightly by pain    Left rotation limited slightly by pain  Strength:    Full strength throughout all neck muscles, Deltoid (C5) weakness, triceps (C7) on left weakness  Special Tests:    Positive: None    Negative: Spurling's (bilateral), Melgoza's (bilateral)    Independent visualization of the below image:    Large Joint Injection/Arthocentesis: L subacromial bursa    Date/Time: 3/8/2023 10:18 AM  Performed by: Yeo, Albert, MD  Authorized by: Yeo, Albert, MD     Indications:  Pain  Needle Size:  25 G  Guidance: landmark guided    Approach:  Posterior  Location:  Shoulder      Site:  L subacromial bursa  Medications:  40 mg methylPREDNISolone 40 MG/ML  Outcome:  Tolerated well, no immediate complications  Procedure discussed: discussed risks, benefits, and alternatives    Consent Given by:  Patient  Timeout: timeout called immediately prior to procedure    Prep: patient was prepped and draped in usual sterile fashion    Large Joint Injection/Arthocentesis    Date/Time: 3/8/2023 10:19  AM  Performed by: Yeo, Albert, MD  Authorized by: Yeo, Albert, MD     Indications:  Pain  Needle Size:  25 G  Guidance: landmark guided    Approach:  Posterior  Location:  Shoulder   Location comment:  Left trapezius and medial scapular muscle spasms      Medications:  40 mg methylPREDNISolone 40 MG/ML  Outcome:  Tolerated well, no immediate complications  Procedure discussed: discussed risks, benefits, and alternatives    Timeout: timeout called immediately prior to procedure    Prep: patient was prepped and draped in usual sterile fashion     2 trigger point injections along the superior to mid scapular border           Patient's conditions were thoroughly discussed during today's visit with total time spent face-to-face with the patient and documentation being 38 minutes.    Albert Yeo MD, [facility]            Again, thank you for allowing me to participate in the care of your patient.        Sincerely,        Albert Yeo, MD

## 2023-03-08 NOTE — PROGRESS NOTES
ASSESSMENT & PLAN  Patient Instructions     1. Impingement syndrome of left shoulder    2. Strain of neck muscle, subsequent encounter    3. Muscle strain of left scapular region, subsequent encounter    4. Cervical radicular pain    5. Muscle strain of right scapular region, subsequent encounter    6. Fibromyalgia    7. Anxiety with somatic features      -Patient is following up for chronic neck and upper back pain due to muscle strains, left shoulder pain due to impingement and new onset right shoulder and upper back pain due to muscle spasms.  Patient also has chronic musculoskeletal pains due to fibromyalgia as well as chronic stress and anxiety exacerbating all of her symptoms   -Patient has some sort of underlying trigger or mental health issue causing her to self sabotage and not allow her to successfully keep the weight off  -Patient states that for years she has been persistently pursuing ways to manage her stress through exercise, yoga, meditation without success.  Patient has repeatedly dieted and lost weight but will always overeat and regain all the weight when she is close to her goals and does not know why.  -Patient is already referred to the [place] physician directed weight loss program which should be helpful.  -However, patient has underlying stress, anxiety and mental health issues that have been neglected for years that is likely a major cause for many of her physical ailments  -Patient should contact her primary care physician for referral either to a therapist and or psychiatrist for further evaluation and treatment  -Patient will also start Cymbalta 30 mg 1 tablet daily for 1 week and then 2 tablets daily.  Studies have shown that 60 mg of Cymbalta daily has significantly improved musculoskeletal pain from fibromyalgia after 2 to 3 months.  -Patient will follow up in 6 weeks for reevaluation.  Patient may follow-up in 2 weeks if right shoulder blade and upper back pain is persistent for  "potential trigger point injections on that side  -Call direct clinic number [[phone]] at any time with questions or concerns.    Albert Yeo MD [facility] Orthopedics and Sports Medicine  [facility]          -----    SUBJECTIVE:  Edwina Zhou is a 67 year old female who is seen in follow-up for bilateral shoulder pain.They were last seen 3/3/2023 .     Since their last visit reports 10% improvement. They indicate that their current pain level is 5/10. They have tried Medrol Dosepak (just finished today) and Tizanidine    The patient is seen by themselves.    Patient's past medical, surgical, social, and family histories were reviewed today and no changes are noted.    REVIEW OF SYSTEMS:  Constitutional: NEGATIVE for fever, chills, change in weight  Skin: NEGATIVE for worrisome rashes, moles or lesions  GI/: NEGATIVE for bowel or bladder changes  Neuro: NEGATIVE for weakness, dizziness or paresthesias    OBJECTIVE:  /78   Ht 1.549 m (5' 1\")   Wt 78 kg (172 lb)   LMP 06/03/2010   BMI 32.50 kg/m     General: healthy, alert and in no distress  HEENT: no scleral icterus or conjunctival erythema  Skin: no suspicious lesions or rash. No jaundice.  CV: regular rhythm by palpation, no pedal edema  Resp: normal respiratory effort without conversational dyspnea   Psych: normal mood and affect  Gait: normal steady gait with appropriate coordination and balance  Neuro: normal light touch sensory exam of the extremities.    MSK:  LEFT SHOULDER  Inspection:    no swelling, bruising, discoloration, or obvious deformity or asymmetry  Palpation:    Tender about the infraspinatus insertion, upper trapezius region and rhomboids. Remainder of bony and tendinous landmarks are nontender.  Active Range of Motion:     Abduction 1050, FF 1350, , IR L4.      Scapular dyskinesis absent  Strength:    Scapular plane abduction painful,  ER weakness, IR grossly intact  Special Tests:    Positive: " Neer's, Foley', supraspinatus (empty can), drop arm/painful arc and crossed arm adduction    Negative: [place]'s and Speed's     CERVICAL SPINE  Inspection:    normal cervical lordosis present, rounded shoulders, forward head posture  Palpation:    Tender about the paracervical musculature (left), levator scapula (left), upper trapezius (left), lower trapezius (left), rhomboids (left), scapular spine and medial border of scapula. Otherwise remainder of the landmarks and nontender.  Range of Motion:     Flexion limited slightly by pain    Extension limited slightly by pain    Right side bend limited slightly by pain    Left side bend limited slightly by pain    Right rotation limited slightly by pain    Left rotation limited slightly by pain  Strength:    Full strength throughout all neck muscles, Deltoid (C5) weakness, triceps (C7) on left weakness  Special Tests:    Positive: None    Negative: Spurling's (bilateral), Melgoza's (bilateral)    Independent visualization of the below image:    Large Joint Injection/Arthocentesis: L subacromial bursa    Date/Time: 3/8/2023 10:18 AM  Performed by: Yeo, Albert, MD  Authorized by: Yeo, Albert, MD     Indications:  Pain  Needle Size:  25 G  Guidance: landmark guided    Approach:  Posterior  Location:  Shoulder      Site:  L subacromial bursa  Medications:  40 mg methylPREDNISolone 40 MG/ML  Outcome:  Tolerated well, no immediate complications  Procedure discussed: discussed risks, benefits, and alternatives    Consent Given by:  Patient  Timeout: timeout called immediately prior to procedure    Prep: patient was prepped and draped in usual sterile fashion    Large Joint Injection/Arthocentesis    Date/Time: 3/8/2023 10:19 AM  Performed by: Yeo, Albert, MD  Authorized by: Yeo, Albert, MD     Indications:  Pain  Needle Size:  25 G  Guidance: landmark guided    Approach:  Posterior  Location:  Shoulder   Location comment:  Left trapezius and medial scapular muscle  spasms      Medications:  40 mg methylPREDNISolone 40 MG/ML  Outcome:  Tolerated well, no immediate complications  Procedure discussed: discussed risks, benefits, and alternatives    Timeout: timeout called immediately prior to procedure    Prep: patient was prepped and draped in usual sterile fashion     2 trigger point injections along the superior to mid scapular border           Patient's conditions were thoroughly discussed during today's visit with total time spent face-to-face with the patient and documentation being 38 minutes.    Albert Yeo MD, [facility]

## 2023-03-08 NOTE — PATIENT INSTRUCTIONS
Thank you for choosing [facility] Podiatry / Foot & Ankle Surgery!    DR NGO'S CLINIC:  [facility]   [address]   [place] [address]      TRIAGE LINE: [phone]  APPOINTMENTS: [phone]  RADIOLOGY: [phone]  SET UP SURGERY: [phone]  PHYSICAL THERAPY: [phone]   FAX NUMBER: [phone]  BILLING QUESTIONS: [phone]       Follow up: As needed      [place] [facility] LOCATIONS  [facility]  [address]  [place] [address]  Phone: [phone]  Fax: [phone] [facility]  [address]  [place] [address]  Phone: [phone]   Fax: [phone]   [facility]  [address]  [place] [address]  Phone: [phone]  Fax: [phone] [facility]  [address]  [place] [address]  Phone: [phone]   Fax: [phone]   [facility]   [address]  [place] [address]  Phone: [phone]  Fax: [phone] * Please call any location listed to make an appointment for a casting/fitting. Your referral was sent to their central office and they will all have the order on file.      CAPSULITIS / METATARSALGIA  All joints in the body are surrounded by a capsule, or a covering of soft tissue and ligaments. The capsule holds bones together and secretes joint fluid to help lubricate the joint. If a joint capsule is exposed to excessive force, it can develop microscopic tears and become inflamed. This commonly occurs in the foot due to mild variation in anatomy. Hammertoes, bunions, irregular bone length, joint immobility, etc. can all lead to excessive force on the joint. Capsule injury can also occur due to repetitive stress from exercise, insufficient support from shoes, excessive bare foot walking and excessive weight.      Conservative treatments include ice, rest from the aggravating activity,  weight loss, orthotic inserts, improving shoes and shoe modifications. Appropriate shoes will protect the inflamed tissue improving the chances of healing. Avoidance of standing or walking barefoot, including around the house, is necessary to allow healing. Casts are sometimes used for more aggressive protection.  NSAIDs such as Advil are also used to help with pain and decreasing inflammation. If pain continues over a period of weeks with continuous rest and icing, Corticosteroid injections can be a treatment option to try and help decrease inflammation.    Surgery is often necessary to correct the underlying structural problem. Surgery might include shortening an excessively long bone, repairing bunion or hammertoe, lengthening a tight Achilles  tendon, etc. These are same day surgeries that might be pursued if more conservative measures fail to provide relief.      The inflamed joint capsule has the potential to completely tear. This will allow the toe to drift off the ground, curving toward the other toes. The involved toe may under or overlap the adjacent toes as drift continues. The pain may improve after the joint tears or this new position will be permanent. Surgery can address the toe alignment. Your goal of treating capsulitis is to avoid this scenario.       HOLM'S NEUROMA   Holm's neuroma is an enlargement or thickening of a nerve in the foot. It is also sometimes referred to as an intermetatarsal neuroma, interdigital neuroma, Holm's metatarsalgia (pain in the metatarsal head area), chetan-neural fibrosis (scar tissue around a nerve) or entrapment neuropathy (abnormal nerve due to compression). A Holm's neuroma most commonly occurs in the third interspace between the third and fourth toes, followed by the second interspace between the second and third toes. Holm's neuromas have also occurred in the fourth and first interspaces, but these are rare. If you have a Holm's neuroma, there is a 15%  "chance it will occur bilaterally (on both feet). Holm's neuromas occur most commonly in women who are between 30 to 50 years old. The reason they are more common in women is thought to be due to the shoes women wear.   CAUSES: A Holm's neuroma is thought to be caused by trauma to the nerve, but scientists are still not sure about the exact cause of the trauma. The trauma may be caused by the metatarsal heads, the deep transverse intermetatarsal ligament (holds the metatarsal heads together) or an intermetatarsal bursa (fluid-filled sac). All of these structures can cause compression/trauma on the nerve which initially causes swelling and injury in the nerve. Over time if the compression/trauma continues, the nerve repairs itself with very fibrous tissue that leads to enlargement and thickening of the nerve. Other causes of trauma to the nerve may include; overpronation (foot rolls inward), hypermobility (too much motion), cavo varus (high arch foot) and excessive dorsiflexion (toes bend upward) of the toes. These biomechanical (howthe foot moves) factors may cause trauma to the nerve with every step. If the nerve becomes irritated and enlarged then it takes up more space and gets even more compressed and irritated. It becomes a vicious cycle.   SIGNS & SYMPTOMS  - Pain (sharp, stabbing, throbbing, shooting)   - Numbness   - Tingling or \"pins & needles\"   - Burning   - Cramping   - Feeling that you are stepping on something or that something is in your shoe   - Initially the symptoms may happen once in a while, but as the condition gets worse, the symptoms may happen all of the time   - It usually feels better by taking off your shoe and massaging your foot     DIAGNOSIS: Your podiatrist will ask many questions about your signs and symptoms and will perform a physical exam. Some of the exams may include a web space compression test. This is done by squeezing the metatarsals together with one hand and using the " thumb and index finger of the other hand to compress the affected web space to reproduce the pain/symptoms. A palpable click (Bucky's click) is usually present. This test may also cause pain to shoot into the toes and that is called a Tinel's sign. Yoav's test involves squeezing the metatarsals together and moving the toes up and down for 30 seconds. This will usually cause pain or it will bring on your other symptoms. Rosa's sign is positive when you stand and the affected toes spread apart. A Holm's neuroma is usually diagnosed based on the history and physical exam findings, but sometimes other tests such as an x-ray, ultrasound or an MRI are needed.   TREATMENT  1.  Footwear Changes: Wear shoes that are wide and deep in the toe box so they  do not put pressure on your toes and metatarsals. Avoid wearing high heels because they cause increased pressure on the ball of your foot (forefoot).    2.  Metatarsal Pads: These help to lift and separate the metatarsal heads to take pressure off of the nerve. They are placed just behind where you feel the pain, not on top of the painful spot.   3.  Activity modification: For example, you may try swimming instead of running until your symptoms go away.   4.  Taping   5.  Icing   6.  NSAIDs (anti-inflammatories): aleve, ibuprofen, etc.   7.  Arch Supports or Orthotics: These help to control some of the abnormal motion in your feet. The abnormal motion can lead to extra torque and pressure on the nerve.   8.  Physical Therapy  9.  Cortisone Injection: Helps to decrease the size of the irritated, enlarged nerve.   10.  Sclerosing Alcohol Injection: Helps to destroy the nerve chemically, which causes permanent numbness    SURGERY  If conservative treatment does not help surgery may be needed. Surgery may involve cutting out the nerve or cutting the intermetatarsalligament. Studies have shown surgery has an 80-85% success rate.  Will result in  numbness    PREVENTION  -Avoid wearing narrow, pointed toe shoes, or high heels

## 2023-03-08 NOTE — PATIENT INSTRUCTIONS
1. Impingement syndrome of left shoulder    2. Strain of neck muscle, subsequent encounter    3. Muscle strain of left scapular region, subsequent encounter    4. Cervical radicular pain    5. Muscle strain of right scapular region, subsequent encounter    6. Fibromyalgia    7. Anxiety with somatic features      -Patient is following up for chronic neck and upper back pain due to muscle strains, left shoulder pain due to impingement and new onset right shoulder and upper back pain due to muscle spasms.  Patient also has chronic musculoskeletal pains due to fibromyalgia as well as chronic stress and anxiety exacerbating all of her symptoms   -Patient has some sort of underlying trigger or mental health issue causing her to self sabotage and not allow her to successfully keep the weight off  -Patient states that for years she has been persistently pursuing ways to manage her stress through exercise, yoga, meditation without success.  Patient has repeatedly dieted and lost weight but will always overeat and regain all the weight when she is close to her goals and does not know why.  -Patient is already referred to the [place] physician directed weight loss program which should be helpful.  -However, patient has underlying stress, anxiety and mental health issues that have been neglected for years that is likely a major cause for many of her physical ailments  -Patient should contact her primary care physician for referral either to a therapist and or psychiatrist for further evaluation and treatment  -Patient will also start Cymbalta 30 mg 1 tablet daily for 1 week and then 2 tablets daily.  Studies have shown that 60 mg of Cymbalta daily has significantly improved musculoskeletal pain from fibromyalgia after 2 to 3 months.  -Patient will follow up in 6 weeks for reevaluation.  Patient may follow-up in 2 weeks if right shoulder blade and upper back pain is persistent for potential trigger point injections on that  side  -Call direct clinic number [[phone]] at any time with questions or concerns.    Albert Yeo MD [facility]  [facility] [place]

## 2023-03-08 NOTE — PROGRESS NOTES
"PATIENT HISTORY:  Dr. Story requested I see this patient for their foot issue.  Edwina Zhou is a 67 year old female who presents to clinic for pain to both feet.  She notes its to the ball of both feet.  8 out of 10 at its worst.  Notes that it is quite often worse with taking or increased activity.  Will be aching, tingling, sharp pain.  Denies specific injury.  Notes she does have fibromyalgia and pain to multiple other joints.  She is tried different shoes but it has not really helped.  She is wondering what is causing the pain and what can be done for it.    Review of Systems:  Patient denies fever, chills, rash, wound, stiffness, numbness, weakness, heart burn, blood in stool, chest pain with activity, calf pain when walking, shortness of breath with activity, chronic cough, easy bleeding/bruising, swelling of ankles, excessive thirst, fatigue, depression, anxiety.  Patient admits to limping at times.     PAST MEDICAL HISTORY:   Past Medical History:   Diagnosis Date     Allergic rhinitis, cause unspecified      Arthritis      Dysphagia     had esophagram 12/07 - tertiary contractions of esoph with some retention, EGD showed gastritis, duodenitis and esophagitis ,pt didn't start a PPI       Fatigue      Fatigue      Gastroesophageal reflux disease      H/O YISEL I     s/p culpo with bx x 2 and cryotherapy - ok for paps q3yrs starting 2012     Helicobacter pylori (H. pylori) infection in 2009     Hyperlipidemia LDL goal < 130 02/04/2005    lipitor \"attacked my muscles\" (Hx fibromyalgia); [id number] failed to lower lipids enough&=myalgias also , lovastatin 20mg = myalgias      Hypothyroidism, unspecified type- needs lab follow up and ? refills  01/12/2022     Lyme disease 08/20/2010     Myalgia and myositis, unspecified     fibromyalgia - doing an otc human growth  hormone      OBESITY NOS- hx of      lost 30+ pounds since 2002 breast reduction      Obesity- worsening fibromyalgia, back pain, pcos  " 12/01/2009     Polycystic ovaries      Postmenopausal since age 52      Shingles 08/20/2010        PAST SURGICAL HISTORY:   Past Surgical History:   Procedure Laterality Date     BREAST SURGERY  15+years ago    reduction     COLONOSCOPY  due again     COLONOSCOPY Left 08/02/2019    Procedure: COLONOSCOPY (FV);  Surgeon: Sung Shanks MD;  Location: [facility]     COMBINED CYSTOSCOPY, INSERT STENT URETER(S) Bilateral 02/13/2018    Procedure: COMBINED CYSTOSCOPY, INSERT STENT URETER(S);  cystoscopy with bilateral ureteral stent placement;  Surgeon: Stephen Muniz MD;  Location: [facility]     COSMETIC SURGERY       CYSTOSCOPY N/A 02/13/2018    Procedure: CYSTOSCOPY;   CYSTOSCOPY;  Surgeon: North Cai MD;  Location: [facility]     DAVINCI HYSTERECTOMY SUPRACERVICAL, SACROCOLPOPEXY, COMBINED N/A 02/13/2018    Procedure: COMBINED DAVINCI HYSTERECTOMY SUPRACERVICAL, SACROCOLPOPEXY;  Cystoscopy, bilateral ureteral stent placement,  Robotic supracervical hysterectomy, bilateral salpingo-oophorectomy and sacrocolpopexy,  cystoscopy, removal of ureteral stents;  Surgeon: North Cai MD;  Location: [facility]     EXAM UNDER ANESTHESIA PELVIC N/A 02/13/2018    Procedure: EXAM UNDER ANESTHESIA PELVIC;;  Surgeon: North Cai MD;  Location: [facility]     HYSTERECTOMY, PAP STILL INDICATED       KNEE SURGERY Right     Meniscal repair     left rotator cuff repair  08/2019    [facility]     ORTHOPEDIC SURGERY  ?    meniscus     SOFT TISSUE SURGERY  2019?    Shoulder - Rotator Cuff     SURGICAL HISTORY OF -   1990's    COLPO WITH BX X2 + CRYO     SURGICAL HISTORY OF -       WISDOM TEETH-SUBSEQ MINDY FXR=ORIF     SURGICAL HISTORY OF -   2002    breast reduction - [facility]      TONSILLECTOMY  at age 32        MEDICATIONS:   Current Outpatient Medications:      Cholecalciferol (VITAMIN D3) 50 MCG (2000 UT) CAPS, Take 1 capsule by mouth daily 1 capsule daily (Patient not taking: Reported on 2/13/2023), Disp: ,  Rfl: 0     hydrocortisone 2.5 % cream, Apply very small amount to eyelids up to twice daily, Disp: 20 g, Rfl: 3     levothyroxine (SYNTHROID/LEVOTHROID) 25 MCG tablet, Take 1 tablet (25 mcg) by mouth daily, Disp: 90 tablet, Rfl: 3     loratadine (CLARITIN) 10 MG tablet, Take 1 tablet (10 mg) by mouth 2 times daily, Disp: , Rfl: 1 YEAR     Melatonin ER 5 MG TBCR, , Disp: , Rfl:      methylPREDNISolone (MEDROL DOSEPAK) 4 MG tablet therapy pack, Follow Package Directions (Patient not taking: Reported on 3/8/2023), Disp: 21 tablet, Rfl: 0     Omega-3 Fatty Acids (FISH OIL) 875 MG CHEW, 1 tab chewable twice daily (Patient not taking: Reported on 2/13/2023), Disp:  , Rfl:      rosuvastatin (CRESTOR) 5 MG tablet, Take 1 tablet (5 mg) by mouth daily, Disp: 90 tablet, Rfl: 3     tiZANidine (ZANAFLEX) 4 MG tablet, Take 1 tablet (4 mg) by mouth nightly as needed for muscle spasms, Disp: 30 tablet, Rfl: 0     ALLERGIES:    Allergies   Allergen Reactions     Atorvastatin      Muscle cramps with lipitor      Cephalexin      Started acyclovir and keflex together.  Unsure if drug reaction or viral exanthem.         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     Comment: [employer]   Tobacco Use     Smoking status: Never     Smokeless tobacco: Never   Vaping Use     Vaping Use: Never used   Substance and Sexual Activity     Alcohol use: Yes     Comment: 0-2 glasses of wine weekly     Drug use: No     Sexual activity: Yes     Partners: Male     Birth control/protection: Post-menopausal     Comment:  to Niraj Zhou    Other Topics Concern      Service No     Blood Transfusions No     Caffeine Concern Yes     Comment: diet soda and tea occasionally      Exercise Yes      Comment: doing [place] Weight loss - horse back riding, [place] track, hiking      Seat Belt Yes     Comment: always      Self-Exams Yes     Comment: SBE encouraged monthly      Parent/sibling w/ CABG, MI or angioplasty before 65F 55M? No   Social History Narrative    Found 6  - 1/2 siblings from her birth mother Angelica Bess - 3 prior to pt's birth from 1 father , then pt from a different father, then 3 from 3rd father . She has since passed away.  From complications of dementia.  - pt found them through 23&me.  Pt's daughter Brielle is doing family genealogy.  No genetic markers for dementia or breast cancer in pt's 23&me.  Pt's adoptive parents had passed when pt did her 23&me.  ---Phuong Story MD  - February 13, 2023      Social Determinants of Health     Financial Resource Strain: Not on file   Food Insecurity: Not on file   Transportation Needs: Not on file   Physical Activity: Not on file   Stress: Not on file   Social Connections: Not on file   Intimate Partner Violence: Not on file   Housing Stability: Not on file        FAMILY HISTORY:   Family History   Adopted: Yes   Problem Relation Age of Onset     Breast Cancer Mother         birth mother found through 23&me     Dementia Mother         happened after a hypoxic even     Unknown/Adopted Father         found through 23     Family History Negative Other         pt is adopted - no knowledge of family hx         EXAM:Vitals: /78   Wt 78 kg (172 lb)   LMP 06/03/2010   BMI 32.50 kg/m      General appearance: Patient is alert and fully cooperative with history & exam.  No sign of distress is noted during the visit.     Psychiatric: Affect is pleasant & appropriate.  Patient appears motivated to improve health.     Respiratory: Breathing is regular & unlabored while sitting.     HEENT: Hearing is intact to spoken word.  Speech is clear.  No gross evidence of visual impairment that would impact ambulation.     Dermatologic: Skin is intact to  both lower extremities without significant lesions, rash or abrasion.  No paronychia or evidence of soft tissue infection is noted.     Vascular: DP & PT pulses are intact & regular bilaterally.  No significant edema or varicosities noted.  CFT and skin temperature is normal to both lower extremities.     Neurologic: Lower extremity sensation is intact to light touch.  No evidence of weakness or contracture in the lower extremities.  No evidence of neuropathy.     Musculoskeletal: Patient is ambulatory without assistive device or brace.  Increased arch height.  Pain on palpation of the plantar distal aspects of the second metatarsal heads bilaterally.  Pain on palpation of the third intermetatarsal spaces bilaterally and with lateral compression of both feet.    Radiographs: foot xrays bilateral - I personally reviewed the xrays -increased arch height bilaterally.  No fractures are noted.  Second metatarsals are elongated compared to the first metatarsal.     ASSESSMENT:    Bilateral foot pain  Pes cavus  Holm's metatarsalgia, neuralgia, or neuroma, bilateral  Capsulitis of metatarsophalangeal (MTP) joints of both feet     Medical Decision Making/Plan:  Reviewed patient's chart in [facility].  Reviewed and discussed x-rays. Reviewed and discussed causes of capsulitis.  Talked about how the elongated 2nd metatarsal can cause more pressure to occur to the joint.  We talked about treatments such as padding, orthotics, injection, physical therapy, immobilization, MRI, and possible surgery to shorten metatarsal.     We discussed causes and treatments of neuromas.  These included shoe gear, orthotics, metatarsal pads, cortisone injections, ice, physical therapy, and possible surgical removal.     At this time I recommend custom inserts given her foot type.  I feel this would be better than an over-the-counter and would likely address more of her issues.  An order was placed for this.  Also recommend over-the-counter topical  pain cream.  She was given an order for physical therapy to try to help as well with trans electrical nerve stimulation and stretching.  If pain continues may get MRIs of the feet to assess further pathology.  All questions were answered to patient's satisfaction and she will call further questions or concerns..      Patient risk factor: Patient is at low risk for infection.        Yenni Srinivasan DPM, Podiatry/Foot and Ankle Surgery

## 2023-03-10 ENCOUNTER — MYC MEDICAL ADVICE (OUTPATIENT)
Dept: FAMILY MEDICINE | Facility: CLINIC | Age: 68
End: 2023-03-10
Payer: COMMERCIAL

## 2023-03-10 DIAGNOSIS — F50.89 OVEREATING DISORDER: ICD-10-CM

## 2023-03-10 DIAGNOSIS — F43.22 ADJUSTMENT DISORDER WITH ANXIOUS MOOD: Primary | ICD-10-CM

## 2023-03-25 DIAGNOSIS — S16.1XXA STRAIN OF NECK MUSCLE, INITIAL ENCOUNTER: ICD-10-CM

## 2023-03-28 ENCOUNTER — HOSPITAL ENCOUNTER (OUTPATIENT)
Dept: PHYSICAL THERAPY | Facility: REHABILITATION | Age: 68
Discharge: HOME | End: 2023-03-28
Payer: COMMERCIAL

## 2023-03-28 DIAGNOSIS — M25.562 CHRONIC PAIN OF BOTH KNEES: ICD-10-CM

## 2023-03-28 DIAGNOSIS — M25.561 CHRONIC PAIN OF BOTH KNEES: ICD-10-CM

## 2023-03-28 DIAGNOSIS — M79.671 BILATERAL FOOT PAIN: Primary | ICD-10-CM

## 2023-03-28 DIAGNOSIS — G89.29 CHRONIC PAIN OF BOTH KNEES: ICD-10-CM

## 2023-03-28 DIAGNOSIS — M79.672 BILATERAL FOOT PAIN: Primary | ICD-10-CM

## 2023-03-28 PROCEDURE — 97110 THERAPEUTIC EXERCISES: CPT | Mod: GP | Performed by: PHYSICAL THERAPIST

## 2023-03-28 PROCEDURE — 97162 PT EVAL MOD COMPLEX 30 MIN: CPT | Mod: GP | Performed by: PHYSICAL THERAPIST

## 2023-03-28 NOTE — PROGRESS NOTES
"Subjective: She has been having right knee pain for a number of years, but it got significantly worse after falling last spring. She has also been having pain, numbness, and tingling in her distal metatarsal heads bilaterally, medially greater than laterally.     Assessment: Edwina Parsons presents to therapy with c/o bilateral foot and knee pain, R>L. She demonstrates good ROM and strength throughout, only lacking in right knee extension. This could possibly be a cause of her pain, as walking with decreased knee extension can lead to walking with more plantarflexion and therefore more force on the distal met heads. She also had significant tenderness in right long toe flexors and bilateral short toe flexors and foot intrinsics, R>L. We discussed the possibility of dry needling being effective at treating the foot pain and she seemed interested in the intervention. Edwina Parsons will likely benefit from PT in order to promote increased knee extension ROM and decrease her pain in order to improve her QOL.    Therapeutic Exercise    Date 3/28/23   Exercise    Gastroc stretch 2 x30\"   Toe yoga x2'   Seated HS stretch x45\"                                           Ross Azar, PT, DPT, CMTPT/DN     "

## 2023-03-28 NOTE — TELEPHONE ENCOUNTER
Last office visit plan from 3/8/23:    Patient will also start Cymbalta 30 mg 1 tablet daily for 1 week and then 2 tablets daily.  Studies have shown that 60 mg of Cymbalta daily has significantly improved musculoskeletal pain from fibromyalgia after 2 to 3 months.  -Patient will follow up in 6 weeks for reevaluation.  Patient may follow-up in 2 weeks if right shoulder blade and upper back pain is persistent for potential trigger point injections on that side    [employer] message sent to patient.     OVIDIO Kern RN

## 2023-03-29 NOTE — TELEPHONE ENCOUNTER
Please see my chart message below     Please review and advise     Thank you     Cortney Call RN, BSN  [facility]

## 2023-03-29 NOTE — TELEPHONE ENCOUNTER
Please see notes below.     Medication Request for: Tizanidine 4mg          Prescription last written on 3/3/23 by Dr. Yeo   Sig: take one tab nightly prn muscle spasms   Last Fill Quantity: 30 with # refills: 0     Last Office Visit by provider: 3/8/23    Patient desires to have faxed or E-prescribe to pharmacy if available  Pharmacy selected in [place].    Phone number patient can be reached at: Cell number on file:    Telephone Information:   Mobile [phone]       Can we leave a detailed message on this number? NO    Please advise regarding refill and if patient should follow up in the office to discuss options.     OVIDIO Kern RN

## 2023-03-29 NOTE — TELEPHONE ENCOUNTER
Received the following Mychart response:    I would like a refill of the muscle relaxer. It really does help me sleep better.     I already received injections. They worked for a bit but the pain returned. Don t know if it really helps?

## 2023-03-30 DIAGNOSIS — M79.7 FIBROMYALGIA: ICD-10-CM

## 2023-04-04 ENCOUNTER — HOSPITAL ENCOUNTER (OUTPATIENT)
Dept: PHYSICAL THERAPY | Facility: REHABILITATION | Age: 68
Discharge: HOME | End: 2023-04-04
Payer: COMMERCIAL

## 2023-04-04 DIAGNOSIS — M79.671 BILATERAL FOOT PAIN: Primary | ICD-10-CM

## 2023-04-04 DIAGNOSIS — G89.29 CHRONIC PAIN OF BOTH KNEES: ICD-10-CM

## 2023-04-04 DIAGNOSIS — M25.561 CHRONIC PAIN OF BOTH KNEES: ICD-10-CM

## 2023-04-04 DIAGNOSIS — M79.672 BILATERAL FOOT PAIN: Primary | ICD-10-CM

## 2023-04-04 DIAGNOSIS — M25.562 CHRONIC PAIN OF BOTH KNEES: ICD-10-CM

## 2023-04-04 DIAGNOSIS — S16.1XXA STRAIN OF NECK MUSCLE, INITIAL ENCOUNTER: ICD-10-CM

## 2023-04-04 PROCEDURE — 97110 THERAPEUTIC EXERCISES: CPT | Mod: GP | Performed by: PHYSICAL THERAPIST

## 2023-04-04 PROCEDURE — 20561 NDL INSJ W/O NJX 3+ MUSC: CPT | Performed by: PHYSICAL THERAPIST

## 2023-04-04 NOTE — PROGRESS NOTES
"Subjective: She has been feeling pretty good lately. She thinks that the new medication she is taking is helping her overall pain levels. The exercises have been going well and feel like they are helping with the pain.    Assessment: Edwina Parsons responded well to the dry needling performed. She had pain and discomfort during it but only minor soreness afterwards during the exercises. We discussed more exercises today to help with foot mobility and reduced soreness following dry needling. Edwina Parsons will benefit from further PT to promote further decreases in her symptoms and improve her standing tolerance.    Therapeutic Exercise    Date 4/4/23 3/28/23   Exercise     Gastroc stretch 2 x45\" B 2 x30\"   Toe yoga x2' x2'   Seated HS stretch x45\" x45\"   Manual foot folding x1'    [facility] gather education                                           Ross Azar, PT, DPT, CMTPT/DN       "

## 2023-04-04 NOTE — TELEPHONE ENCOUNTER
Patient reported to us she requested this medication to be filled last week, now patient is out of medication.

## 2023-04-04 NOTE — TELEPHONE ENCOUNTER
Patient is calling regarding the status of this message. Patient also wants to know Dr. Story's opinion on taking Cymbalta. Is it a good medication to be taking? Is it interacting with her other medications. Is there anything she should be watchful for that Dr. HARRIS knows of.

## 2023-04-04 NOTE — TELEPHONE ENCOUNTER
This RX was filled by Dr. Yeo yesterday and should be @ pharmacy      Martita Armendariz MBA, MS, PA-C  [facility]

## 2023-04-05 RX ORDER — DULOXETIN HYDROCHLORIDE 30 MG/1
CAPSULE, DELAYED RELEASE ORAL
Qty: 60 CAPSULE | Refills: 2 | OUTPATIENT
Start: 2023-04-05

## 2023-04-11 ENCOUNTER — ANCILLARY PROCEDURE (OUTPATIENT)
Dept: BONE DENSITY | Facility: CLINIC | Age: 68
End: 2023-04-11
Attending: FAMILY MEDICINE
Payer: COMMERCIAL

## 2023-04-11 DIAGNOSIS — E55.9 VITAMIN D DEFICIENCY: ICD-10-CM

## 2023-04-11 PROCEDURE — 77080 DXA BONE DENSITY AXIAL: CPT | Performed by: INTERNAL MEDICINE

## 2023-04-18 ENCOUNTER — HOSPITAL ENCOUNTER (OUTPATIENT)
Dept: PHYSICAL THERAPY | Facility: REHABILITATION | Age: 68
Discharge: HOME | End: 2023-04-18
Payer: COMMERCIAL

## 2023-04-18 DIAGNOSIS — M79.671 BILATERAL FOOT PAIN: Primary | ICD-10-CM

## 2023-04-18 DIAGNOSIS — M25.561 CHRONIC PAIN OF BOTH KNEES: ICD-10-CM

## 2023-04-18 DIAGNOSIS — M79.672 BILATERAL FOOT PAIN: Primary | ICD-10-CM

## 2023-04-18 DIAGNOSIS — G89.29 CHRONIC PAIN OF BOTH KNEES: ICD-10-CM

## 2023-04-18 DIAGNOSIS — M25.562 CHRONIC PAIN OF BOTH KNEES: ICD-10-CM

## 2023-04-18 PROCEDURE — 97110 THERAPEUTIC EXERCISES: CPT | Mod: GP | Performed by: PHYSICAL THERAPIST

## 2023-04-18 PROCEDURE — 20561 NDL INSJ W/O NJX 3+ MUSC: CPT | Performed by: PHYSICAL THERAPIST

## 2023-04-18 NOTE — PROGRESS NOTES
"Subjective: Her knee is hurting a little more the last couple of weeks. She has been feeling like her knee wants to give out at times and there are days where the exercises feel like they are causing more pain. She mostly felt okay with the dry needling done with some days being pain free and others not. She has a little more pain in her toes today. She is able to go up on her toes more easily and with less pain lately.    Assessment: Ediwna Parsons responded well to the needling again today, reporting feeling muscle soreness in the areas needled but had less palpable muscle tension and less palpable pain. She had further decrease in her pain with the prone quad stretch. We again discussed the importance of continuing her exercises at home and post-needling care in order to reduce adverse events. Edwina Parsons will benefit from continued PT to promote further decreases in her pain and improvement in her function.     Therapeutic Exercise    Date 4/18/23 4/4/23 3/28/23   Exercise      Gastroc stretch  2 x45\" B 2 x30\"   Toe yoga x2' x2' x2'   Seated HS stretch  x45\" x45\"   Manual foot folding  x1'    Towel gather  education    Prone quad stretch x1' B     Metatarsal self-mobs x2'                                       Ross Azar, PT, DPT, CMTPT/DN       "

## 2023-04-24 ENCOUNTER — HOSPITAL ENCOUNTER (OUTPATIENT)
Dept: PHYSICAL THERAPY | Facility: REHABILITATION | Age: 68
Discharge: HOME | End: 2023-04-24
Payer: COMMERCIAL

## 2023-04-24 DIAGNOSIS — M25.561 CHRONIC PAIN OF BOTH KNEES: ICD-10-CM

## 2023-04-24 DIAGNOSIS — G89.29 CHRONIC PAIN OF BOTH KNEES: ICD-10-CM

## 2023-04-24 DIAGNOSIS — M25.562 CHRONIC PAIN OF BOTH KNEES: ICD-10-CM

## 2023-04-24 DIAGNOSIS — M79.671 BILATERAL FOOT PAIN: Primary | ICD-10-CM

## 2023-04-24 DIAGNOSIS — M79.672 BILATERAL FOOT PAIN: Primary | ICD-10-CM

## 2023-04-24 PROCEDURE — 97110 THERAPEUTIC EXERCISES: CPT | Mod: GP | Performed by: PHYSICAL THERAPIST

## 2023-04-24 PROCEDURE — 97140 MANUAL THERAPY 1/> REGIONS: CPT | Mod: GP | Performed by: PHYSICAL THERAPIST

## 2023-04-24 NOTE — PROGRESS NOTES
"Subjective: Everything is feeling worse today. She had more pain after the needling for a day or two then things went back to normal. The last couple of days have been feeling like she is back to square one.    Assessment: The muscles that were needled last session were not tender today and didn't have any trigger points in them. She had significant tenderness in her right hip adductors and her pes anserine. This tenderness was reduced with the knee mobilizations and myofascial release that was performed today. She noted significant relief in her symptoms with the stretches performed as well. Edwina Parsons will benefit from further therapy to promote continued decrease in her knee and foot pain.    Therapeutic Exercise    Date 4/24/23 4/18/23 4/4/23 3/28/23   Exercise       Gastroc stretch   2 x45\" B 2 x30\"   Toe yoga  x2' x2' x2'   Seated HS stretch x45\"  x45\" x45\"   Manual foot folding   x1'    Towel gather   education    Prone quad stretch  x1' B     Metatarsal self-mobs  x2'     Happy baby stretch 2 x45\"      Standing hip add stretch 2 x45\" B                               Ross Azar, PT, DPT, CMTPT/DN       "

## 2023-04-26 DIAGNOSIS — S16.1XXA STRAIN OF NECK MUSCLE, INITIAL ENCOUNTER: ICD-10-CM

## 2023-04-26 NOTE — TELEPHONE ENCOUNTER
Medication Request for: Tizanidine 4mg          Prescription last written on 4/3/23 by Dr. Yeo    Sig: take 1 tab q HS prn  Last Fill Quantity: 30 with # refills: 0     Last Office Visit by provider: 3/8/23  Patient desires to have faxed or E-prescribe to pharmacy if available  Pharmacy selected in [place].    Phone number patient can be reached at: Cell number on file:    Telephone Information:   Mobile [phone]       Can we leave a detailed message on this number? NO    Please advise regarding refill request.     OVIDIO Kern RN

## 2023-05-03 DIAGNOSIS — M79.7 FIBROMYALGIA: ICD-10-CM

## 2023-05-04 ENCOUNTER — HOSPITAL ENCOUNTER (OUTPATIENT)
Dept: PHYSICAL THERAPY | Facility: REHABILITATION | Age: 68
Discharge: HOME | End: 2023-05-04
Payer: COMMERCIAL

## 2023-05-04 DIAGNOSIS — M25.561 CHRONIC PAIN OF BOTH KNEES: ICD-10-CM

## 2023-05-04 DIAGNOSIS — M25.562 CHRONIC PAIN OF BOTH KNEES: ICD-10-CM

## 2023-05-04 DIAGNOSIS — G89.29 CHRONIC PAIN OF BOTH KNEES: ICD-10-CM

## 2023-05-04 DIAGNOSIS — M79.672 BILATERAL FOOT PAIN: Primary | ICD-10-CM

## 2023-05-04 DIAGNOSIS — M79.671 BILATERAL FOOT PAIN: Primary | ICD-10-CM

## 2023-05-04 PROCEDURE — 97140 MANUAL THERAPY 1/> REGIONS: CPT | Mod: GP | Performed by: PHYSICAL THERAPIST

## 2023-05-04 PROCEDURE — 97110 THERAPEUTIC EXERCISES: CPT | Mod: GP | Performed by: PHYSICAL THERAPIST

## 2023-05-04 NOTE — PROGRESS NOTES
"Subjective: \"I think it changes with the weather.\" Her pain was worse when she was doing the exercises religiously but she started riding her e-bike and things feel a lot better with that too. She fell on her bike and landed on her right knee. Her feet have been feeling okay.     Assessment: Edwina Parsons is showing improvement in her knee and foot symptoms. She continues to have increased tenderness in her pes anserine and articularis genu, but there was less tenderness after manual therapy today. She did well with the hip abductor and adductor strengthening added in today, noting feeling leg fatigue but no increase in her pain. She reported having less pain in her knees following today's session. Edwina Prasons will benefit from continued PT to promote increased knee and foot strength and decrease her pain.    Therapeutic Exercise    Date 5/4/23 4/24/23 4/18/23 4/4/23 3/28/23   Exercise        Gastroc stretch    2 x45\" B 2 x30\"   Toe yoga   x2' x2' x2'   Seated HS stretch  x45\"  x45\" x45\"   Manual foot folding    x1'    Towel gather    education    Prone quad stretch x1' B  x1' B     Metatarsal self-mobs   x2'     Happy baby stretch  2 x45\"      Standing hip add stretch  2 x45\" B      S/L hip adduction x15 B       S/L hip abduction x15 B       Hip IR stretch x1' B           Ross Azar, PT, DPT, CMTPT/DN       "

## 2023-05-09 ENCOUNTER — HOSPITAL ENCOUNTER (OUTPATIENT)
Dept: PHYSICAL THERAPY | Facility: REHABILITATION | Age: 68
Discharge: HOME | End: 2023-05-09
Payer: COMMERCIAL

## 2023-05-09 DIAGNOSIS — G89.29 CHRONIC PAIN OF BOTH KNEES: ICD-10-CM

## 2023-05-09 DIAGNOSIS — M25.561 CHRONIC PAIN OF BOTH KNEES: ICD-10-CM

## 2023-05-09 DIAGNOSIS — M79.671 BILATERAL FOOT PAIN: Primary | ICD-10-CM

## 2023-05-09 DIAGNOSIS — M79.672 BILATERAL FOOT PAIN: Primary | ICD-10-CM

## 2023-05-09 DIAGNOSIS — M25.562 CHRONIC PAIN OF BOTH KNEES: ICD-10-CM

## 2023-05-09 PROCEDURE — 97140 MANUAL THERAPY 1/> REGIONS: CPT | Mod: GP | Performed by: PHYSICAL THERAPIST

## 2023-05-09 PROCEDURE — 97110 THERAPEUTIC EXERCISES: CPT | Mod: GP | Performed by: PHYSICAL THERAPIST

## 2023-05-09 NOTE — PROGRESS NOTES
"Subjective: Still feeling sore today but can't really figure out why. The exercises seem to help her foot and she can tell when she doesn't do them, but they aren't providing lasting relief. Biking has been feeling good on her knees.    Assessment: Edwina Parsons has remained roughly the same level of function and pain for the last few sessions. She has shown improved right knee extension ROM today, though it is still decreased compared to her left. She continues to have increased levels of tenderness and pain in her articularis genu and in her foot intrinsics, though it was relieved slight with manual therapy today. As she is reaching a plateau she may benefit from a second PT's assessment.    Therapeutic Exercise    Date 5/9/23 5/4/23 4/24/23 4/18/23 4/4/23 3/28/23   Exercise         NuStep, position 5 lvl 5 x5'        Gastroc stretch     2 x45\" B 2 x30\"   Toe yoga    x2' x2' x2'   Seated HS stretch   x45\"  x45\" x45\"   Manual foot folding     x1'    Towel gather     education    Prone quad stretch  x1' B  x1' B     Metatarsal self-mobs    x2'     Happy baby stretch   2 x45\"      Standing hip add stretch   2 x45\" B      S/L hip adduction  x15 B       S/L hip abduction  x15 B       Hip IR stretch  x1' B       SLR 2 x10 B        Bridges  x15            Ross Azar, PT, DPT, CMTPT/DN         "

## 2023-05-10 RX ORDER — DULOXETIN HYDROCHLORIDE 30 MG/1
CAPSULE, DELAYED RELEASE ORAL
Qty: 60 CAPSULE | Refills: 2 | Status: SHIPPED | OUTPATIENT
Start: 2023-05-10 | End: 2023-06-28

## 2023-05-10 NOTE — TELEPHONE ENCOUNTER
"Patient calls checking status of Cymbalta refill from 5/3/23. She took her last does last night.   She is currently taking 2 tabs once daily.     She asks if she is supposed to follow up with her PCP for refills or Dr. Yeo. She is taking for shoulder pain, knee pain, and multiple body areas for her fibromyalgia.     Per office visit note of 3/8/23, patient was to follow up in 6 weeks. Patient was not aware of this.     Her shoulders are better with the Cymbalta but still painful. She states Dr. Yeo mentioned physical therapy for her shoulders but she did not get an order.   She feels the Cymbalta worked really well at first and now has backed off some.   Her right knee ROM has improved with physical therapy and Cymbalta but is still \"hurts\".     She was told this medication is not something to stop \"cold turkey\".     Will discuss with provider and get back with her.   Ok to leave message : YES    Medication Request for: Cymbalta 30mg      Prescription last written on 3/8/23 by Dr. Yeo    Sig: take 1 tab daily x 7 days, then increase to 2 tabs daily   Last Fill Quantity: 60 with # refills: 2     Last Office Visit by provider: 3/8/23    Please advise regarding refill request and if patient needs to follow up with you or PCP.     OVIDIO Kern RN    "

## 2023-05-12 NOTE — TELEPHONE ENCOUNTER
Phone call to [facility] and patient has not picked up the prescription sent yet.     Left voicemail for patient informing her that prescription was sent to the pharmacy on Wednesday.     OVIDIO Kern RN

## 2023-05-17 ENCOUNTER — THERAPY VISIT (OUTPATIENT)
Dept: PHYSICAL THERAPY | Facility: REHABILITATION | Age: 68
End: 2023-05-17
Payer: COMMERCIAL

## 2023-05-17 DIAGNOSIS — G89.29 CHRONIC PAIN OF BOTH KNEES: ICD-10-CM

## 2023-05-17 DIAGNOSIS — M79.671 BILATERAL FOOT PAIN: Primary | ICD-10-CM

## 2023-05-17 DIAGNOSIS — M79.672 BILATERAL FOOT PAIN: Primary | ICD-10-CM

## 2023-05-17 DIAGNOSIS — M25.561 CHRONIC PAIN OF BOTH KNEES: ICD-10-CM

## 2023-05-17 DIAGNOSIS — M25.562 CHRONIC PAIN OF BOTH KNEES: ICD-10-CM

## 2023-05-17 PROCEDURE — 97140 MANUAL THERAPY 1/> REGIONS: CPT | Mod: GP | Performed by: PHYSICAL THERAPIST

## 2023-05-17 PROCEDURE — 97110 THERAPEUTIC EXERCISES: CPT | Mod: GP | Performed by: PHYSICAL THERAPIST

## 2023-05-17 NOTE — PROGRESS NOTES
"Subjective: She reports that she can E-bike, but walking too much or doing stairs are still difficult and painful.  Right>left anterior knee pain.  6-7/10 at it's worst.  Pain with walking 6/10. She reports being approximately 50% of where she would like to be.    Assessment: Edwina Parsons has remained roughly the same level of function and pain for the last few sessions. She has not been performing the HEP on a daily basis, but will start.  That should help her to improve past this point.  She responded well to manual therapy.  Her pain level dropped to 3/10 post treatment.    Therapeutic Exercise    Date 5/17/23 5/9/23 5/4/23 4/24/23 4/18/23 4/4/23 3/28/23   Exercise          NuStep, position 5 lvl 5 x5' lvl 5 x5'        Gastroc stretch  wedge 30\" x 2 bilateral     2 x45\" B 2 x30\"   Toe yoga     x2' x2' x2'   Seated HS stretch 30\" x 2 bilateral   x45\"  x45\" x45\"   Manual foot folding      x1'    Towel gather      education    Prone quad stretch Verbal review patient using strap  x1' B  x1' B     Metatarsal self-mobs     x2'     Happy baby stretch    2 x45\"      Standing hip add stretch    2 x45\" B      S/L hip adduction   x15 B       S/L hip abduction   x15 B       Hip IR stretch   x1' B       SLR  2 x10 B        Bridges  Verbal review x15        Sit to stand X 10             Eusebio Vega, PT         "

## 2023-05-31 ENCOUNTER — OFFICE VISIT (OUTPATIENT)
Dept: FAMILY MEDICINE | Facility: CLINIC | Age: 68
End: 2023-05-31
Payer: COMMERCIAL

## 2023-05-31 VITALS
HEIGHT: 61 IN | DIASTOLIC BLOOD PRESSURE: 72 MMHG | RESPIRATION RATE: 16 BRPM | HEART RATE: 107 BPM | WEIGHT: 184 LBS | SYSTOLIC BLOOD PRESSURE: 117 MMHG | BODY MASS INDEX: 34.74 KG/M2 | TEMPERATURE: 98.4 F | OXYGEN SATURATION: 97 %

## 2023-05-31 DIAGNOSIS — R63.5 WEIGHT GAIN: ICD-10-CM

## 2023-05-31 DIAGNOSIS — E66.09 CLASS 1 OBESITY DUE TO EXCESS CALORIES WITH SERIOUS COMORBIDITY AND BODY MASS INDEX (BMI) OF 34.0 TO 34.9 IN ADULT: ICD-10-CM

## 2023-05-31 DIAGNOSIS — G89.29 CHRONIC PAIN OF BOTH SHOULDERS: ICD-10-CM

## 2023-05-31 DIAGNOSIS — E66.811 CLASS 1 OBESITY DUE TO EXCESS CALORIES WITH SERIOUS COMORBIDITY AND BODY MASS INDEX (BMI) OF 34.0 TO 34.9 IN ADULT: ICD-10-CM

## 2023-05-31 DIAGNOSIS — E03.9 HYPOTHYROIDISM, UNSPECIFIED TYPE: ICD-10-CM

## 2023-05-31 DIAGNOSIS — M25.50 MULTIPLE JOINT PAIN: ICD-10-CM

## 2023-05-31 DIAGNOSIS — G89.29 CHRONIC PAIN OF BOTH KNEES: ICD-10-CM

## 2023-05-31 DIAGNOSIS — R53.82 CHRONIC FATIGUE: ICD-10-CM

## 2023-05-31 DIAGNOSIS — M25.561 CHRONIC PAIN OF BOTH KNEES: ICD-10-CM

## 2023-05-31 DIAGNOSIS — E78.5 HYPERLIPIDEMIA WITH TARGET LDL LESS THAN 130: ICD-10-CM

## 2023-05-31 DIAGNOSIS — M25.562 CHRONIC PAIN OF BOTH KNEES: ICD-10-CM

## 2023-05-31 DIAGNOSIS — Z71.82 EXERCISE COUNSELING: ICD-10-CM

## 2023-05-31 DIAGNOSIS — M25.511 CHRONIC PAIN OF BOTH SHOULDERS: ICD-10-CM

## 2023-05-31 DIAGNOSIS — G47.9 SLEEP DISTURBANCE: ICD-10-CM

## 2023-05-31 DIAGNOSIS — M25.512 CHRONIC PAIN OF BOTH SHOULDERS: ICD-10-CM

## 2023-05-31 DIAGNOSIS — G25.81 RESTLESS LEGS SYNDROME: Primary | ICD-10-CM

## 2023-05-31 LAB
ALBUMIN SERPL BCG-MCNC: 4 G/DL (ref 3.5–5.2)
ALP SERPL-CCNC: 64 U/L (ref 35–104)
ALT SERPL W P-5'-P-CCNC: 38 U/L (ref 10–35)
ANION GAP SERPL CALCULATED.3IONS-SCNC: 12 MMOL/L (ref 7–15)
AST SERPL W P-5'-P-CCNC: 35 U/L (ref 10–35)
BASOPHILS # BLD AUTO: 0.1 10E3/UL (ref 0–0.2)
BASOPHILS NFR BLD AUTO: 1 %
BILIRUB SERPL-MCNC: 0.2 MG/DL
BUN SERPL-MCNC: 12.7 MG/DL (ref 8–23)
CALCIUM SERPL-MCNC: 9.6 MG/DL (ref 8.8–10.2)
CHLORIDE SERPL-SCNC: 105 MMOL/L (ref 98–107)
CREAT SERPL-MCNC: 0.69 MG/DL (ref 0.51–0.95)
DEPRECATED HCO3 PLAS-SCNC: 24 MMOL/L (ref 22–29)
EOSINOPHIL # BLD AUTO: 0.4 10E3/UL (ref 0–0.7)
EOSINOPHIL NFR BLD AUTO: 5 %
ERYTHROCYTE [DISTWIDTH] IN BLOOD BY AUTOMATED COUNT: 12.6 % (ref 10–15)
FERRITIN SERPL-MCNC: 96 NG/ML (ref 11–328)
GFR SERPL CREATININE-BSD FRML MDRD: >90 ML/MIN/1.73M2
GLUCOSE SERPL-MCNC: 83 MG/DL (ref 70–99)
HCT VFR BLD AUTO: 39.8 % (ref 35–47)
HGB BLD-MCNC: 12.9 G/DL (ref 11.7–15.7)
IMM GRANULOCYTES # BLD: 0 10E3/UL
IMM GRANULOCYTES NFR BLD: 0 %
LDH SERPL L TO P-CCNC: 270 U/L (ref 0–250)
LYMPHOCYTES # BLD AUTO: 2.3 10E3/UL (ref 0.8–5.3)
LYMPHOCYTES NFR BLD AUTO: 29 %
MCH RBC QN AUTO: 30.8 PG (ref 26.5–33)
MCHC RBC AUTO-ENTMCNC: 32.4 G/DL (ref 31.5–36.5)
MCV RBC AUTO: 95 FL (ref 78–100)
MONOCYTES # BLD AUTO: 0.7 10E3/UL (ref 0–1.3)
MONOCYTES NFR BLD AUTO: 9 %
MONOCYTES NFR BLD AUTO: NEGATIVE %
NEUTROPHILS # BLD AUTO: 4.5 10E3/UL (ref 1.6–8.3)
NEUTROPHILS NFR BLD AUTO: 56 %
PLATELET # BLD AUTO: 284 10E3/UL (ref 150–450)
POTASSIUM SERPL-SCNC: 4.4 MMOL/L (ref 3.4–5.3)
PROT SERPL-MCNC: 6.9 G/DL (ref 6.4–8.3)
RBC # BLD AUTO: 4.19 10E6/UL (ref 3.8–5.2)
SODIUM SERPL-SCNC: 141 MMOL/L (ref 136–145)
TSH SERPL DL<=0.005 MIU/L-ACNC: 2.37 UIU/ML (ref 0.3–4.2)
WBC # BLD AUTO: 8 10E3/UL (ref 4–11)

## 2023-05-31 PROCEDURE — 82728 ASSAY OF FERRITIN: CPT | Performed by: FAMILY MEDICINE

## 2023-05-31 PROCEDURE — 86618 LYME DISEASE ANTIBODY: CPT | Performed by: FAMILY MEDICINE

## 2023-05-31 PROCEDURE — 86308 HETEROPHILE ANTIBODY SCREEN: CPT | Performed by: FAMILY MEDICINE

## 2023-05-31 PROCEDURE — 36415 COLL VENOUS BLD VENIPUNCTURE: CPT | Performed by: FAMILY MEDICINE

## 2023-05-31 PROCEDURE — 99215 OFFICE O/P EST HI 40 MIN: CPT | Performed by: FAMILY MEDICINE

## 2023-05-31 PROCEDURE — 83615 LACTATE (LD) (LDH) ENZYME: CPT | Performed by: FAMILY MEDICINE

## 2023-05-31 PROCEDURE — 80050 GENERAL HEALTH PANEL: CPT | Performed by: FAMILY MEDICINE

## 2023-05-31 RX ORDER — GABAPENTIN 100 MG/1
100 CAPSULE ORAL AT BEDTIME
Qty: 30 CAPSULE | Refills: 0 | Status: SHIPPED | OUTPATIENT
Start: 2023-05-31 | End: 2023-08-02 | Stop reason: SINTOL

## 2023-05-31 RX ORDER — PHENTERMINE HYDROCHLORIDE 15 MG/1
15 CAPSULE ORAL EVERY MORNING
Qty: 30 CAPSULE | Refills: 0 | Status: SHIPPED | OUTPATIENT
Start: 2023-05-31 | End: 2023-06-28 | Stop reason: DRUGHIGH
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
## 2023-05-31 NOTE — PATIENT INSTRUCTIONS
[facility]  [address] [place] [address]  Office: [phone]   Fax:    [phone]     Phentermine 15mg for weight loss.     Look into resistance training - build lean muscle - burns more calories than fat.      Future Appointments 5/31/2023 - 11/27/2023        Date Visit Type Length Department Provider     6/5/2023 11:30 AM EXTREMITY TREATMENT  45 min [facility] PHYSICAL THERAPY Eusebio Vega, PT    Location Instructions:     Our clinic is located at: [address] [place] [address]  How to find our clinic: Enter the clinic through the doors on the South side of the building. Follow signs to the Rehab check in desk in the lobby.  Parking: Free parking available in the surface lot.  Questions or to Reschedule: Contact our clinic: [phone].              6/19/2023 11:30 AM EXTREMITY TREATMENT  45 min [facility] PHYSICAL Eusebio [place], PT    Location Instructions:     Our clinic is located at: [address] [place] [address]  How to find our clinic: Enter the clinic through the doors on the South side of the building. Follow signs to the Rehab check in desk in the lobby.  Parking: Free parking available in the surface lot.  Questions or to Reschedule: Contact our clinic: [phone].              6/26/2023 11:30 AM EXTREMITY TREATMENT  45 min [facility] PHYSICAL Eusebio [place], PT    Location Instructions:     Our clinic is located at: [address] [place] [address]  How to find our clinic: Enter the clinic through the doors on the South side of the building. Follow signs to the Rehab check in desk in the lobby.  Parking: Free parking available in the surface lot.  Questions or to Reschedule: Contact our clinic:  "[phone].              6/28/2023 11:40 AM OFFICE VISIT 20 min [facility] Phuong [place] MD    Location Instructions:     [facility] is located at [address], [address]. Free parking is available; access the lot by turning north from [address] onto [address], then west onto [facility].              8/7/2023  3:00 PM NEW 15 min [facility] Yovany Martínez MD    Location Instructions:     [address] [place] [id number]                    Thank you so much or choosing [facility]  for your Health Care. It was a pleasure seeing you at your visit today! Please contact us with any questions or concerns you may have.                   Phuong Story MD                              To reach your [facility] care team after hours call:   [phone] press #2 \"to speak with your care team\".  This will get you to our clinic instead of routing to central [facility]  scheduling.     PLEASE NOTE OUR HOURS HAVE CHANGED secondary to COVID-19 coronavirus pandemic, as we are trying to minimize patient exposure to the virus,  which is now widespread in the Critical access hospital.  These hours may change with very little notice.  We apologize for any inconvenience.       Our current clinic hours are:          Monday- Thursday   7:00am - 6:00pm  in person.      Friday  7:00am- 5:00pm                       Saturday and Sunday : Closed to in person and virtual visits        We have telephone and virtual visit times available between    7:00am - 6pm on Monday-Friday as well.                                                Phone:  [phone]      Our pharmacy hours: Monday through Friday 8:00am to 5:00pm                        Saturday - 9:00 am to 12 noon       Sunday : Closed.              Phone:  [phone]              ###  Please note: at this time we are not accepting any walk-in " visits. ###      There is also information available at our web site:  [email or web address]    If your provider ordered any lab tests and you do not receive the results within 10 business days, please call the clinic.    If you need a medication refill please contact your pharmacy.  Please allow 3 business days for your refill to be completed.    Our clinic offers telephone visits and e visits.  Please ask one of your team members to explain more.      Use [employer] (secure email communication and access to your chart) to send your primary care provider a message or make an appointment. Ask someone on your Team how to sign up for [employer].

## 2023-06-01 LAB — B BURGDOR IGG+IGM SER QL: 0.24

## 2023-06-19 NOTE — PROGRESS NOTES
DISCHARGE  Reason for Discharge: Patient chooses to discontinue therapy.    Equipment Issued: NA    Discharge Plan: Patient to continue home program.    Referring Provider:  Chantel Rangel     05/17/23 0500   Appointment Info   Signing clinician's name / credentials Eusebio Vega, PT   Visits Used 7   Medical Diagnosis Bilateral foot pain, Pes cavus, Holm's metatarsalgia, neuralgia, or neuroma, bilateral, Capsulitis of metatarsophalangeal (MTP) joints of both feet, Primary osteoarthritis of both knees, Chronic pain of both knees   PT Tx Diagnosis decreased activity tolerance   Progress Note/Certification   Onset of illness/injury or Date of Surgery 03/08/23   PT Goal 1   Goal Identifier LEFS   Goal Description Edwina Parsons will demonstrate a decrease in pain and increase in function as measured by scoring >/= 60/80 on the LEFS.   Goal Progress 49   Target Date 06/26/23   PT Goal 2   Goal Identifier knee extension ROM   Goal Description Edwina Parsons will increase knee extension ROM to >/= -3 without pain in order to improve the quality of their ADLs and gait.   Target Date 06/26/23   PT Goal 3   Goal Identifier Exercise classes   Goal Description Edwina Parsons will demonstrate improvement in her symptoms by being able to attend her regular exercise classes with </=3/10 pain.   Subjective Report   Subjective Report See note   Treatment Interventions (PT)   Interventions Therapeutic Procedure/Exercise;Manual Therapy   Therapeutic Procedure/Exercise   Therapeutic Procedures: strength, endurance, ROM, flexibillity minutes (57951) 25   Skilled Intervention HEP for pain management and education on possible interventions and course of treatment.  see note.   Manual Therapy   Manual Therapy: Mobilization, MFR, MLD, friction massage minutes (77505) 20   Skilled Intervention Knee mobs and MFR to improve mobility and reduce taut bands in muscle.   Manual Therapy 1 MFR bilateral quads, hamstrings, gastroc   Manual Therapy  2 patella mobs grade 2, 3 bilateral: superior and medial   Education   Learner/Method Patient   Plan   Home program PTRx   Plan for next session Manual as indicated. Progress LE exercises as tolerated.   Comments   Comments See note   Total Session Time   Timed Code Treatment Minutes 45   Total Treatment Time (sum of timed and untimed services) 45   Medicare Claim Information   Medical Diagnosis Bilateral foot pain, Pes cavus, Holm's metatarsalgia, neuralgia, or neuroma, bilateral, Capsulitis of metatarsophalangeal (MTP) joints of both feet, Primary osteoarthritis of both knees, Chronic pain of both knees   PT Diagnosis Decreased activity tolerance.   Start of Care Date 03/28/23   Onset date of current episode/exacerbation 03/08/23   Ortho Goal 1   Goal Identifier LEFS   Goal Description Edwina Parsons will demonstrate a decrease in pain and increase in function as measured by scoring >/= 60/80 on the LEFS.   Goal Progress 49   Target Date 06/26/23   Ortho Goal 2   Goal Identifier Knee extension ROM   Goal Description Edwina Parsons will increase knee extension ROM to >/= -3 without pain in order to improve the quality of their ADLs and gait.   Goal Progress -5   Target Date 06/26/23   Ortho Goal 3   Goal Identifier Exercise classes   Goal Description Edwina Parsons will demonstrate improvement in her symptoms by being able to attend her regular exercise classes with </=3/10 pain.   Goal Progress Unable   Target Date 06/26/23

## 2023-06-25 ENCOUNTER — MYC MEDICAL ADVICE (OUTPATIENT)
Dept: FAMILY MEDICINE | Facility: CLINIC | Age: 68
End: 2023-06-25
Payer: COMMERCIAL

## 2023-06-27 ENCOUNTER — TELEPHONE (OUTPATIENT)
Dept: FAMILY MEDICINE | Facility: CLINIC | Age: 68
End: 2023-06-27

## 2023-06-27 ASSESSMENT — ANXIETY QUESTIONNAIRES
4. TROUBLE RELAXING: SEVERAL DAYS
1. FEELING NERVOUS, ANXIOUS, OR ON EDGE: MORE THAN HALF THE DAYS
6. BECOMING EASILY ANNOYED OR IRRITABLE: NOT AT ALL
IF YOU CHECKED OFF ANY PROBLEMS ON THIS QUESTIONNAIRE, HOW DIFFICULT HAVE THESE PROBLEMS MADE IT FOR YOU TO DO YOUR WORK, TAKE CARE OF THINGS AT HOME, OR GET ALONG WITH OTHER PEOPLE: NOT DIFFICULT AT ALL
5. BEING SO RESTLESS THAT IT IS HARD TO SIT STILL: NOT AT ALL
2. NOT BEING ABLE TO STOP OR CONTROL WORRYING: MORE THAN HALF THE DAYS
3. WORRYING TOO MUCH ABOUT DIFFERENT THINGS: MORE THAN HALF THE DAYS
7. FEELING AFRAID AS IF SOMETHING AWFUL MIGHT HAPPEN: NOT AT ALL
GAD7 TOTAL SCORE: 7

## 2023-06-27 ASSESSMENT — PATIENT HEALTH QUESTIONNAIRE - PHQ9: SUM OF ALL RESPONSES TO PHQ QUESTIONS 1-9: 2

## 2023-06-27 NOTE — TELEPHONE ENCOUNTER
No future labs.   [employer] message sent   Dana HERNANDEZ RN   [facility] Triage

## 2023-06-28 ENCOUNTER — OFFICE VISIT (OUTPATIENT)
Dept: FAMILY MEDICINE | Facility: CLINIC | Age: 68
End: 2023-06-28
Payer: COMMERCIAL

## 2023-06-28 VITALS
HEIGHT: 61 IN | OXYGEN SATURATION: 97 % | DIASTOLIC BLOOD PRESSURE: 70 MMHG | BODY MASS INDEX: 33.23 KG/M2 | RESPIRATION RATE: 19 BRPM | WEIGHT: 176 LBS | HEART RATE: 81 BPM | SYSTOLIC BLOOD PRESSURE: 110 MMHG | TEMPERATURE: 98.2 F

## 2023-06-28 DIAGNOSIS — M79.7 FIBROMYALGIA: ICD-10-CM

## 2023-06-28 DIAGNOSIS — R05.8 PRODUCTIVE COUGH: ICD-10-CM

## 2023-06-28 DIAGNOSIS — J06.9 VIRAL URI WITH COUGH: ICD-10-CM

## 2023-06-28 DIAGNOSIS — E66.09 CLASS 1 OBESITY DUE TO EXCESS CALORIES WITH SERIOUS COMORBIDITY AND BODY MASS INDEX (BMI) OF 33.0 TO 33.9 IN ADULT: ICD-10-CM

## 2023-06-28 DIAGNOSIS — E66.811 CLASS 1 OBESITY DUE TO EXCESS CALORIES WITH SERIOUS COMORBIDITY AND BODY MASS INDEX (BMI) OF 33.0 TO 33.9 IN ADULT: ICD-10-CM

## 2023-06-28 DIAGNOSIS — Z71.82 EXERCISE COUNSELING: Primary | ICD-10-CM

## 2023-06-28 PROCEDURE — 99214 OFFICE O/P EST MOD 30 MIN: CPT | Performed by: FAMILY MEDICINE

## 2023-06-28 PROCEDURE — 87635 SARS-COV-2 COVID-19 AMP PRB: CPT | Performed by: FAMILY MEDICINE

## 2023-06-28 RX ORDER — DULOXETIN HYDROCHLORIDE 30 MG/1
30 CAPSULE, DELAYED RELEASE ORAL DAILY
Start: 2023-06-28 | End: 2023-08-02

## 2023-06-28 RX ORDER — PHENTERMINE HYDROCHLORIDE 30 MG/1
30 CAPSULE ORAL EVERY MORNING
Qty: 30 CAPSULE | Refills: 1 | Status: SHIPPED | OUTPATIENT
Start: 2023-06-28 | End: 2023-08-02 | Stop reason: DRUGHIGH

## 2023-06-28 ASSESSMENT — PATIENT HEALTH QUESTIONNAIRE - PHQ9
10. IF YOU CHECKED OFF ANY PROBLEMS, HOW DIFFICULT HAVE THESE PROBLEMS MADE IT FOR YOU TO DO YOUR WORK, TAKE CARE OF THINGS AT HOME, OR GET ALONG WITH OTHER PEOPLE: NOT DIFFICULT AT ALL
SUM OF ALL RESPONSES TO PHQ QUESTIONS 1-9: 2
SUM OF ALL RESPONSES TO PHQ QUESTIONS 1-9: 2

## 2023-06-28 ASSESSMENT — ANXIETY QUESTIONNAIRES
2. NOT BEING ABLE TO STOP OR CONTROL WORRYING: MORE THAN HALF THE DAYS
6. BECOMING EASILY ANNOYED OR IRRITABLE: NOT AT ALL
7. FEELING AFRAID AS IF SOMETHING AWFUL MIGHT HAPPEN: NOT AT ALL
GAD7 TOTAL SCORE: 7
1. FEELING NERVOUS, ANXIOUS, OR ON EDGE: MORE THAN HALF THE DAYS
3. WORRYING TOO MUCH ABOUT DIFFERENT THINGS: MORE THAN HALF THE DAYS
5. BEING SO RESTLESS THAT IT IS HARD TO SIT STILL: NOT AT ALL
4. TROUBLE RELAXING: SEVERAL DAYS
IF YOU CHECKED OFF ANY PROBLEMS ON THIS QUESTIONNAIRE, HOW DIFFICULT HAVE THESE PROBLEMS MADE IT FOR YOU TO DO YOUR WORK, TAKE CARE OF THINGS AT HOME, OR GET ALONG WITH OTHER PEOPLE: NOT DIFFICULT AT ALL
GAD7 TOTAL SCORE: 7
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
## 2023-06-29 LAB — SARS-COV-2 RNA RESP QL NAA+PROBE: NEGATIVE

## 2023-08-02 ENCOUNTER — OFFICE VISIT (OUTPATIENT)
Dept: FAMILY MEDICINE | Facility: CLINIC | Age: 68
End: 2023-08-02
Payer: COMMERCIAL

## 2023-08-02 VITALS
WEIGHT: 171.2 LBS | SYSTOLIC BLOOD PRESSURE: 128 MMHG | BODY MASS INDEX: 32.32 KG/M2 | TEMPERATURE: 98.9 F | HEART RATE: 91 BPM | OXYGEN SATURATION: 99 % | RESPIRATION RATE: 18 BRPM | HEIGHT: 61 IN | DIASTOLIC BLOOD PRESSURE: 66 MMHG

## 2023-08-02 DIAGNOSIS — Z71.82 EXERCISE COUNSELING: Primary | ICD-10-CM
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
## 2023-08-02 DIAGNOSIS — E78.5 HYPERLIPIDEMIA WITH TARGET LDL LESS THAN 130: ICD-10-CM

## 2023-08-02 PROCEDURE — 99214 OFFICE O/P EST MOD 30 MIN: CPT | Performed by: FAMILY MEDICINE

## 2023-08-02 RX ORDER — PHENTERMINE HYDROCHLORIDE 37.5 MG/1
37.5 TABLET ORAL
Qty: 30 TABLET | Refills: 1 | Status: SHIPPED | OUTPATIENT
Start: 2023-08-02 | End: 2023-08-17 | Stop reason: SINTOL

## 2023-08-02 RX ORDER — ROSUVASTATIN CALCIUM 5 MG/1
5 TABLET, COATED ORAL DAILY
Qty: 90 TABLET | Refills: 3 | Status: SHIPPED | OUTPATIENT
Start: 2023-08-02 | End: 2024-04-30
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
## 2023-08-06 DIAGNOSIS — M79.7 FIBROMYALGIA: ICD-10-CM

## 2023-08-07 ENCOUNTER — OFFICE VISIT (OUTPATIENT)
Dept: DERMATOLOGY | Facility: CLINIC | Age: 68
End: 2023-08-07
Attending: FAMILY MEDICINE
Payer: COMMERCIAL

## 2023-08-07 DIAGNOSIS — L81.4 LENTIGINES: Primary | ICD-10-CM

## 2023-08-07 DIAGNOSIS — D18.01 CHERRY ANGIOMA: ICD-10-CM

## 2023-08-07 DIAGNOSIS — L57.8 ACTINIC SKIN DAMAGE: ICD-10-CM

## 2023-08-07 DIAGNOSIS — L82.1 SEBORRHEIC KERATOSES: ICD-10-CM

## 2023-08-07 DIAGNOSIS — D22.9 MULTIPLE PIGMENTED NEVI: ICD-10-CM

## 2023-08-07 DIAGNOSIS — L57.8 SUN-DAMAGED SKIN: ICD-10-CM

## 2023-08-07 PROCEDURE — 99203 OFFICE O/P NEW LOW 30 MIN: CPT | Performed by: STUDENT IN AN ORGANIZED HEALTH CARE EDUCATION/TRAINING PROGRAM

## 2023-08-07 ASSESSMENT — PAIN SCALES - GENERAL: PAINLEVEL: NO PAIN (0)

## 2023-08-07 NOTE — PROGRESS NOTES
"[facility] Dermatology Note    Encounter Date: Aug 7, 2023    Dermatology Problem List:    ______________________________________    Impression/Plan:  Edwina was seen today for skin check.    Diagnoses and all orders for this visit:    Lentigines    Multiple pigmented nevi  -     Adult Dermatology Referral    Sun-damaged skin  -     Adult Dermatology Referral    Actinic skin damage    Seborrheic keratoses    Cherry angioma        {Procedure:334068}    Follow-up in ***.       Staff Involved:  Staff Only    Yovany Martínez MD   of Dermatology  Department of Dermatology  [facility]      CC:   Chief Complaint   Patient presents with    Skin Check       History of Present Illness:  Ms. Edwina Zhou is a 68 year old female who presents as a new patient.    Very active outdoors, wears facial sunscreen     Labs:      Physical exam:  Vitals: LMP 06/03/2010   GEN: well developed, well-nourished, in no acute distress, in a pleasant mood.     SKIN: Sher phototype 1  - Full skin, which includes the head/face, both arms, chest, back, abdomen,both legs, genitalia and/or groin buttocks, digits and/or nails, was examined.  - Flat brown macules and patches in a sun exposed areas on face and extremities  - No other lesions of concern on areas examined.     Past Medical History:   Past Medical History:   Diagnosis Date    Allergic rhinitis, cause unspecified     Arthritis     Dysphagia     had esophagram 12/07 - tertiary contractions of esoph with some retention, EGD showed gastritis, duodenitis and esophagitis ,pt didn't start a PPI      Fatigue     Fatigue     Gastroesophageal reflux disease     H/O YISEL I     s/p culpo with bx x 2 and cryotherapy - ok for paps q3yrs starting 2012    Helicobacter pylori (H. pylori) infection in 2009    Hyperlipidemia LDL goal < 130 02/04/2005    lipitor \"attacked my muscles\" (Hx fibromyalgia); [id number] failed to lower " lipids enough&=myalgias also , lovastatin 20mg = myalgias     Hypothyroidism, unspecified type- needs lab follow up and ? refills  01/12/2022    Lyme disease 08/20/2010    Myalgia and myositis, unspecified     fibromyalgia - doing an otc human growth  hormone     OBESITY NOS- hx of      lost 30+ pounds since 2002 breast reduction     Obesity- worsening fibromyalgia, back pain, pcos  12/01/2009    Polycystic ovaries     Postmenopausal since age 52     Shingles 08/20/2010     Past Surgical History:   Procedure Laterality Date    BREAST SURGERY  15+years ago    reduction    COLONOSCOPY  due again    COLONOSCOPY Left 08/02/2019    Procedure: COLONOSCOPY (FV);  Surgeon: Sung Shanks MD;  Location: [facility]    COMBINED CYSTOSCOPY, INSERT STENT URETER(S) Bilateral 02/13/2018    Procedure: COMBINED CYSTOSCOPY, INSERT STENT URETER(S);  cystoscopy with bilateral ureteral stent placement;  Surgeon: Stephen Muniz MD;  Location: [facility]    COSMETIC SURGERY      CYSTOSCOPY N/A 02/13/2018    Procedure: CYSTOSCOPY;   CYSTOSCOPY;  Surgeon: North Cai MD;  Location: [facility]    DAVINCI HYSTERECTOMY SUPRACERVICAL, SACROCOLPOPEXY, COMBINED N/A 02/13/2018    Procedure: COMBINED DAVINCI HYSTERECTOMY SUPRACERVICAL, SACROCOLPOPEXY;  Cystoscopy, bilateral ureteral stent placement,  Robotic supracervical hysterectomy, bilateral salpingo-oophorectomy and sacrocolpopexy,  cystoscopy, removal of ureteral stents;  Surgeon: North Cai MD;  Location: [facility]    EXAM UNDER ANESTHESIA PELVIC N/A 02/13/2018    Procedure: EXAM UNDER ANESTHESIA PELVIC;;  Surgeon: North Cai MD;  Location: [facility]    HYSTERECTOMY, PAP STILL INDICATED      KNEE SURGERY Right     Meniscal repair    left rotator cuff repair  08/2019    [facility]    ORTHOPEDIC SURGERY  ?    meniscus    SOFT TISSUE SURGERY  2019?    Shoulder - Rotator Cuff    SURGICAL HISTORY OF -   1990's    COLPO WITH BX X2 + CRYO    SURGICAL HISTORY OF -       WISDOM  TEETH-SUBSEQ MINDY FXR=ORIF    SURGICAL HISTORY OF -   2002    breast reduction - [facility]     TONSILLECTOMY  at age 32       Social History:   reports that she has never smoked. She has never used smokeless tobacco. She reports current alcohol use. She reports that she does not use drugs.    Family History:  Family History   Adopted: Yes   Problem Relation Age of Onset    Breast Cancer Mother         birth mother found through 23&me    Dementia Mother         happened after a hypoxic even    Unknown/Adopted Father         found through 23    Family History Negative Other         pt is adopted - no knowledge of family hx        Medications:  Current Outpatient Medications   Medication Sig Dispense Refill    Cholecalciferol (VITAMIN D3) 50 MCG (2000 UT) CAPS Take 1 capsule by mouth daily 1 capsule daily  0    hydrocortisone 2.5 % cream Apply very small amount to eyelids up to twice daily (Patient not taking: Reported on 8/7/2023) 20 g 3    levothyroxine (SYNTHROID/LEVOTHROID) 25 MCG tablet Take 1 tablet (25 mcg) by mouth daily 90 tablet 3    loratadine (CLARITIN) 10 MG tablet Take 1 tablet (10 mg) by mouth 2 times daily  1 YEAR    Melatonin ER 5 MG TBCR       Omega-3 Fatty Acids (FISH OIL) 875 MG CHEW 1 tab chewable twice daily      phentermine (ADIPEX-P) 37.5 MG tablet Take 1 tablet (37.5 mg) by mouth every morning (before breakfast) 30 tablet 1    rosuvastatin (CRESTOR) 5 MG tablet Take 1 tablet (5 mg) by mouth daily 90 tablet 3     Allergies   Allergen Reactions    Atorvastatin      Muscle cramps with lipitor     Cephalexin      Started acyclovir and keflex together.  Unsure if drug reaction or viral exanthem.

## 2023-08-07 NOTE — LETTER
8/7/2023         RE: Edwina Zhou  [address] [facility] [address]        Dear Colleague,    Thank you for referring your patient, Edwina Zhou, to the [facility]. Please see a copy of my visit note below.    [facility] Dermatology Note    Encounter Date: Aug 7, 2023    Dermatology Problem List:    ______________________________________    Impression/Plan:  Edwina was seen today for skin check.    Diagnoses and all orders for this visit:      Multiple pigmented nevi  -     Adult Dermatology Referral    Sun-damaged skin  -     Adult Dermatology Referral    Actinic skin damage  Lentigines  - discussed sunprotective behaviors, clothing, and the use of sunscreen    Seborrheic keratoses  Cherry angioma  - benign          Follow-up in 1 year .       Staff Involved:  Staff Only    Yovany Martínez MD   of Dermatology  Department of Dermatology  [facility]      CC:   Chief Complaint   Patient presents with     Skin Check       History of Present Illness:  Ms. Edwina Zhou is a 68 year old female who presents as a  new patient.    Patient presents for evaluation of spots on trunk and extremities.  She has never had a skin check before and wants to make sure that nothing is cancerous    Labs:      Physical exam:  Vitals: LMP 06/03/2010   GEN: well developed, well-nourished, in no acute distress, in a pleasant mood.     SKIN: Sher phototype 1  - Full skin, which includes the head/face, both arms, chest, back, abdomen,both legs, genitalia and/or groin buttocks, digits and/or nails, was examined.  - Flat brown macules and patches in a sun exposed areas on face and extremities.  - Stuck on brown papules on trunk and extremities   - pink vascular papules on trunk and extremities  - No other lesions of concern on areas examined.     Past Medical History:   Past Medical History:   Diagnosis Date     Allergic  "rhinitis, cause unspecified      Arthritis      Dysphagia     had esophagram 12/07 - tertiary contractions of esoph with some retention, EGD showed gastritis, duodenitis and esophagitis ,pt didn't start a PPI       Fatigue      Fatigue      Gastroesophageal reflux disease      H/O YISEL I     s/p culpo with bx x 2 and cryotherapy - ok for paps q3yrs starting 2012     Helicobacter pylori (H. pylori) infection in 2009     Hyperlipidemia LDL goal < 130 02/04/2005    lipitor \"attacked my muscles\" (Hx fibromyalgia); [id number] failed to lower lipids enough&=myalgias also , lovastatin 20mg = myalgias      Hypothyroidism, unspecified type- needs lab follow up and ? refills  01/12/2022     Lyme disease 08/20/2010     Myalgia and myositis, unspecified     fibromyalgia - doing an otc human growth  hormone      OBESITY NOS- hx of      lost 30+ pounds since 2002 breast reduction      Obesity- worsening fibromyalgia, back pain, pcos  12/01/2009     Polycystic ovaries      Postmenopausal since age 52      Shingles 08/20/2010     Past Surgical History:   Procedure Laterality Date     BREAST SURGERY  15+years ago    reduction     COLONOSCOPY  due again     COLONOSCOPY Left 08/02/2019    Procedure: COLONOSCOPY (FV);  Surgeon: Sung Shanks MD;  [facility]: [facility]     COMBINED CYSTOSCOPY, INSERT STENT URETER(S) Bilateral 02/13/2018    Procedure: COMBINED CYSTOSCOPY, INSERT STENT URETER(S);  cystoscopy with bilateral ureteral stent placement;  Surgeon: Stephen Muniz MD;  Location: [facility]     COSMETIC SURGERY       CYSTOSCOPY N/A 02/13/2018    Procedure: CYSTOSCOPY;   CYSTOSCOPY;  Surgeon: North Cai MD;  Location: [facility]     DAVINCI HYSTERECTOMY SUPRACERVICAL, SACROCOLPOPEXY, COMBINED N/A 02/13/2018    Procedure: COMBINED DAVINCI HYSTERECTOMY SUPRACERVICAL, SACROCOLPOPEXY;  Cystoscopy, bilateral ureteral stent placement,  Robotic supracervical hysterectomy, bilateral salpingo-oophorectomy and sacrocolpopexy,  " cystoscopy, removal of ureteral stents;  Surgeon: North Cai MD;  Location: [facility]     EXAM UNDER ANESTHESIA PELVIC N/A 02/13/2018    Procedure: EXAM UNDER ANESTHESIA PELVIC;;  Surgeon: North Cai MD;  [facility]: [facility]     HYSTERECTOMY, PAP STILL INDICATED       KNEE SURGERY Right     Meniscal repair     left rotator cuff repair  08/2019    [facility]     ORTHOPEDIC SURGERY  ?    meniscus     SOFT TISSUE SURGERY  2019?    Shoulder - Rotator Cuff     SURGICAL HISTORY OF -   1990's    COLPO WITH BX X2 + CRYO     SURGICAL HISTORY OF -       WISDOM TEETH-SUBSEQ MINDY FXR=ORIF     SURGICAL HISTORY OF -   2002    breast reduction - [facility]      TONSILLECTOMY  at age 32       Social History:   reports that she has never smoked. She has never used smokeless tobacco. She reports current alcohol use. She reports that she does not use drugs.    Family History:  Family History   Adopted: Yes   Problem Relation Age of Onset     Breast Cancer Mother         birth mother found through 23&me     Dementia Mother         happened after a hypoxic even     Unknown/Adopted Father         found through 23     Family History Negative Other         pt is adopted - no knowledge of family hx        Medications:  Current Outpatient Medications   Medication Sig Dispense Refill     Cholecalciferol (VITAMIN D3) 50 MCG (2000 UT) CAPS Take 1 capsule by mouth daily 1 capsule daily  0     hydrocortisone 2.5 % cream Apply very small amount to eyelids up to twice daily (Patient not taking: Reported on 8/7/2023) 20 g 3     levothyroxine (SYNTHROID/LEVOTHROID) 25 MCG tablet Take 1 tablet (25 mcg) by mouth daily 90 tablet 3     loratadine (CLARITIN) 10 MG tablet Take 1 tablet (10 mg) by mouth 2 times daily  1 YEAR     Melatonin ER 5 MG TBCR        Omega-3 Fatty Acids (FISH OIL) 875 MG CHEW 1 tab chewable twice daily       phentermine (ADIPEX-P) 37.5 MG tablet Take 1 tablet (37.5 mg) by mouth every morning (before breakfast) 30  tablet 1     rosuvastatin (CRESTOR) 5 MG tablet Take 1 tablet (5 mg) by mouth daily 90 tablet 3     Allergies   Allergen Reactions     Atorvastatin      Muscle cramps with lipitor      Cephalexin      Started acyclovir and keflex together.  Unsure if drug reaction or viral exanthem.                  Again, thank you for allowing me to participate in the care of your patient.        Sincerely,        Yovany Martínez MD

## 2023-08-07 NOTE — PROGRESS NOTES
[facility] Dermatology Note    Encounter Date: Aug 7, 2023    Dermatology Problem List:    ______________________________________    Impression/Plan:  Edwina was seen today for skin check.    Diagnoses and all orders for this visit:      Multiple pigmented nevi  -     Adult Dermatology Referral    Sun-damaged skin  -     Adult Dermatology Referral    Actinic skin damage  Lentigines  - discussed sunprotective behaviors, clothing, and the use of sunscreen    Seborrheic keratoses  Cherry angioma  - benign          Follow-up in 1 year .       Staff Involved:  Staff Only    Yovany Martínez MD   of Dermatology  Department of Dermatology  [facility]      CC:   Chief Complaint   Patient presents with    Skin Check       History of Present Illness:  Ms. Edwina Zhou is a 68 year old female who presents as a  new patient.    Patient presents for evaluation of spots on trunk and extremities.  She has never had a skin check before and wants to make sure that nothing is cancerous    Labs:      Physical exam:  Vitals: LMP 06/03/2010   GEN: well developed, well-nourished, in no acute distress, in a pleasant mood.     SKIN: Sher phototype 1  - Full skin, which includes the head/face, both arms, chest, back, abdomen,both legs, genitalia and/or groin buttocks, digits and/or nails, was examined.  - Flat brown macules and patches in a sun exposed areas on face and extremities.  - Stuck on brown papules on trunk and extremities   - pink vascular papules on trunk and extremities  - No other lesions of concern on areas examined.     Past Medical History:   Past Medical History:   Diagnosis Date    Allergic rhinitis, cause unspecified     Arthritis     Dysphagia     had esophagram 12/07 - tertiary contractions of esoph with some retention, EGD showed gastritis, duodenitis and esophagitis ,pt didn't start a PPI      Fatigue     Fatigue     Gastroesophageal reflux  "disease     H/O YISEL I     s/p culpo with bx x 2 and cryotherapy - ok for paps q3yrs starting 2012    Helicobacter pylori (H. pylori) infection in 2009    Hyperlipidemia LDL goal < 130 02/04/2005    lipitor \"attacked my muscles\" (Hx fibromyalgia); [id number] failed to lower lipids enough&=myalgias also , lovastatin 20mg = myalgias     Hypothyroidism, unspecified type- needs lab follow up and ? refills  01/12/2022    Lyme disease 08/20/2010    Myalgia and myositis, unspecified     fibromyalgia - doing an otc human growth  hormone     OBESITY NOS- hx of      lost 30+ pounds since 2002 breast reduction     Obesity- worsening fibromyalgia, back pain, pcos  12/01/2009    Polycystic ovaries     Postmenopausal since age 52     Shingles 08/20/2010     Past Surgical History:   Procedure Laterality Date    BREAST SURGERY  15+years ago    reduction    COLONOSCOPY  due again    COLONOSCOPY Left 08/02/2019    Procedure: COLONOSCOPY (FV);  Surgeon: Sung Shanks MD;  [facility]: [facility]    COMBINED CYSTOSCOPY, INSERT STENT URETER(S) Bilateral 02/13/2018    Procedure: COMBINED CYSTOSCOPY, INSERT STENT URETER(S);  cystoscopy with bilateral ureteral stent placement;  Surgeon: Stephen Muniz MD;  Location: [facility]    COSMETIC SURGERY      CYSTOSCOPY N/A 02/13/2018    Procedure: CYSTOSCOPY;   CYSTOSCOPY;  Surgeon: North Cai MD;  Location: [facility]    DAVINCI HYSTERECTOMY SUPRACERVICAL, SACROCOLPOPEXY, COMBINED N/A 02/13/2018    Procedure: COMBINED DAVINCI HYSTERECTOMY SUPRACERVICAL, SACROCOLPOPEXY;  Cystoscopy, bilateral ureteral stent placement,  Robotic supracervical hysterectomy, bilateral salpingo-oophorectomy and sacrocolpopexy,  cystoscopy, removal of ureteral stents;  Surgeon: North Cai MD;  Location: [facility]    EXAM UNDER ANESTHESIA PELVIC N/A 02/13/2018    Procedure: EXAM UNDER ANESTHESIA PELVIC;;  Surgeon: North Cai MD;  Location: [facility]    HYSTERECTOMY, PAP STILL INDICATED      KNEE SURGERY " Right     Meniscal repair    left rotator cuff repair  08/2019    [facility]    ORTHOPEDIC SURGERY  ?    meniscus    SOFT TISSUE SURGERY  2019?    Shoulder - Rotator Cuff    SURGICAL HISTORY OF -   1990's    COLPO WITH BX X2 + CRYO    SURGICAL HISTORY OF -       WISDOM TEETH-SUBSEQ MINDY FXR=ORIF    SURGICAL HISTORY OF -   2002    breast reduction - [facility]     TONSILLECTOMY  at age 32       Social History:   reports that she has never smoked. She has never used smokeless tobacco. She reports current alcohol use. She reports that she does not use drugs.    Family History:  Family History   Adopted: Yes   Problem Relation Age of Onset    Breast Cancer Mother         birth mother found through 23&me    Dementia Mother         happened after a hypoxic even    Unknown/Adopted Father         found through 23    Family History Negative Other         pt is adopted - no knowledge of family hx        Medications:  Current Outpatient Medications   Medication Sig Dispense Refill    Cholecalciferol (VITAMIN D3) 50 MCG (2000 UT) CAPS Take 1 capsule by mouth daily 1 capsule daily  0    hydrocortisone 2.5 % cream Apply very small amount to eyelids up to twice daily (Patient not taking: Reported on 8/7/2023) 20 g 3    levothyroxine (SYNTHROID/LEVOTHROID) 25 MCG tablet Take 1 tablet (25 mcg) by mouth daily 90 tablet 3    loratadine (CLARITIN) 10 MG tablet Take 1 tablet (10 mg) by mouth 2 times daily  1 YEAR    Melatonin ER 5 MG TBCR       Omega-3 Fatty Acids (FISH OIL) 875 MG CHEW 1 tab chewable twice daily      phentermine (ADIPEX-P) 37.5 MG tablet Take 1 tablet (37.5 mg) by mouth every morning (before breakfast) 30 tablet 1    rosuvastatin (CRESTOR) 5 MG tablet Take 1 tablet (5 mg) by mouth daily 90 tablet 3     Allergies   Allergen Reactions    Atorvastatin      Muscle cramps with lipitor     Cephalexin      Started acyclovir and keflex together.  Unsure if drug reaction or viral exanthem.

## 2023-08-07 NOTE — PATIENT INSTRUCTIONS

## 2023-08-08 RX ORDER — DULOXETIN HYDROCHLORIDE 30 MG/1
CAPSULE, DELAYED RELEASE ORAL
Qty: 180 CAPSULE | OUTPATIENT
Start: 2023-08-08

## 2023-08-08 NOTE — TELEPHONE ENCOUNTER
Received refill request for Duloxetine 30mg last prescribed by Dr. Yeo on 5/10/23.   Sig: take 1 tab daily for 7 days, then increase to 2 tabs daily.     Per chart review, this was discontinued by PCP on 6/28/23 with the following comments:     was negatively effecting her sleep and didn't help her pain     Refill request denied.     OVIDIO Kern RN

## 2023-08-08 NOTE — PROGRESS NOTES
Please place future order for Covid Bivalent booster for this nurse only appointment. (Please do NOT close this encounter.)

## 2023-08-10 NOTE — PROGRESS NOTES
Future Appointments 8/10/2023 - 2/6/2024        Date Visit Type Length Department Provider     8/15/2023  4:30 PM COVID VACCINE MODERNA 15 min RI IM RI IM MA/LPN    Location Instructions:     [facility] is in the [facility] at [address] next to [facility]. This is near the [address] and the [address] exits off [address]. To reach the clinic from [place] [address], turn north onto [address], then turn east on [address]. Clinic parking is available next to the [facility], which is just east of the hospital s main entrance.               9/6/2023  5:00 PM OFFICE VISIT 40 min [facility] Phuong Story MD    Location Instructions:     [facility] is located at [address] [place] [address], along [address]. Free parking is available; access the lot by turning north from [address] onto [facility], then west onto [address].                  Future orders placed in Epic (our computer record)  for vaccine(s).

## 2023-08-14 ENCOUNTER — MYC MEDICAL ADVICE (OUTPATIENT)
Dept: FAMILY MEDICINE | Facility: CLINIC | Age: 68
End: 2023-08-14
Payer: COMMERCIAL

## 2023-08-14 DIAGNOSIS — E66.09 CLASS 1 OBESITY DUE TO EXCESS CALORIES WITH SERIOUS COMORBIDITY AND BODY MASS INDEX (BMI) OF 33.0 TO 33.9 IN ADULT: Primary | ICD-10-CM

## 2023-08-14 DIAGNOSIS — Z71.82 EXERCISE COUNSELING: ICD-10-CM

## 2023-08-14 DIAGNOSIS — E66.811 CLASS 1 OBESITY DUE TO EXCESS CALORIES WITH SERIOUS COMORBIDITY AND BODY MASS INDEX (BMI) OF 33.0 TO 33.9 IN ADULT: Primary | ICD-10-CM

## 2023-08-14 DIAGNOSIS — E78.5 HYPERLIPIDEMIA WITH TARGET LDL LESS THAN 130: Primary | ICD-10-CM

## 2023-08-15 ENCOUNTER — IMMUNIZATION (OUTPATIENT)
Dept: INTERNAL MEDICINE | Facility: CLINIC | Age: 68
End: 2023-08-15
Payer: COMMERCIAL

## 2023-08-15 DIAGNOSIS — Z23 NEED FOR COVID-19 VACCINE: Primary | ICD-10-CM

## 2023-08-15 PROCEDURE — 91313 COVID-19 BIVALENT 18+ (MODERNA): CPT

## 2023-08-15 PROCEDURE — 0134A COVID-19 BIVALENT 18+ (MODERNA): CPT

## 2023-08-17 RX ORDER — PHENTERMINE HYDROCHLORIDE 30 MG/1
30 CAPSULE ORAL EVERY MORNING
Qty: 30 CAPSULE | Refills: 1 | Status: SHIPPED | OUTPATIENT
Start: 2023-08-17 | End: 2023-09-28
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
## 2023-08-24 ENCOUNTER — APPOINTMENT (OUTPATIENT)
Dept: LAB | Facility: CLINIC | Age: 68
End: 2023-08-24
Payer: COMMERCIAL

## 2023-09-28 ENCOUNTER — OFFICE VISIT (OUTPATIENT)
Dept: FAMILY MEDICINE | Facility: CLINIC | Age: 68
End: 2023-09-28
Payer: COMMERCIAL

## 2023-09-28 VITALS
SYSTOLIC BLOOD PRESSURE: 118 MMHG | RESPIRATION RATE: 16 BRPM | OXYGEN SATURATION: 97 % | BODY MASS INDEX: 32.1 KG/M2 | HEIGHT: 61 IN | TEMPERATURE: 98.7 F | HEART RATE: 96 BPM | DIASTOLIC BLOOD PRESSURE: 78 MMHG | WEIGHT: 170 LBS

## 2023-09-28 DIAGNOSIS — E66.811 CLASS 1 OBESITY DUE TO EXCESS CALORIES WITH SERIOUS COMORBIDITY AND BODY MASS INDEX (BMI) OF 32.0 TO 32.9 IN ADULT: ICD-10-CM

## 2023-09-28 DIAGNOSIS — F50.819 BINGE EATING DISORDER: ICD-10-CM

## 2023-09-28 DIAGNOSIS — E78.5 HYPERLIPIDEMIA WITH TARGET LDL LESS THAN 130: ICD-10-CM

## 2023-09-28 DIAGNOSIS — Z02.89 ENCOUNTER FOR COMPLETION OF FORM WITH PATIENT: ICD-10-CM

## 2023-09-28 DIAGNOSIS — Z71.82 EXERCISE COUNSELING: Primary | ICD-10-CM

## 2023-09-28 DIAGNOSIS — E66.09 CLASS 1 OBESITY DUE TO EXCESS CALORIES WITH SERIOUS COMORBIDITY AND BODY MASS INDEX (BMI) OF 32.0 TO 32.9 IN ADULT: ICD-10-CM

## 2023-09-28 DIAGNOSIS — M79.7 FIBROMYALGIA: ICD-10-CM

## 2023-09-28 DIAGNOSIS — R53.82 CHRONIC FATIGUE: ICD-10-CM

## 2023-09-28 LAB
HOLD SPECIMEN: NORMAL

## 2023-09-28 PROCEDURE — 84443 ASSAY THYROID STIM HORMONE: CPT | Performed by: FAMILY MEDICINE

## 2023-09-28 PROCEDURE — 36415 COLL VENOUS BLD VENIPUNCTURE: CPT | Performed by: FAMILY MEDICINE

## 2023-09-28 PROCEDURE — 84460 ALANINE AMINO (ALT) (SGPT): CPT | Performed by: FAMILY MEDICINE

## 2023-09-28 PROCEDURE — 84450 TRANSFERASE (AST) (SGOT): CPT | Performed by: FAMILY MEDICINE

## 2023-09-28 PROCEDURE — 90662 IIV NO PRSV INCREASED AG IM: CPT | Performed by: FAMILY MEDICINE

## 2023-09-28 PROCEDURE — 99215 OFFICE O/P EST HI 40 MIN: CPT | Mod: 25 | Performed by: FAMILY MEDICINE

## 2023-09-28 PROCEDURE — 90471 IMMUNIZATION ADMIN: CPT | Performed by: FAMILY MEDICINE

## 2023-09-28 PROCEDURE — 99417 PROLNG OP E/M EACH 15 MIN: CPT | Performed by: FAMILY MEDICINE

## 2023-09-28 RX ORDER — PHENTERMINE HYDROCHLORIDE 30 MG/1
30 CAPSULE ORAL EVERY MORNING
Qty: 30 CAPSULE | Refills: 1 | Status: SHIPPED | OUTPATIENT
Start: 2023-09-28 | End: 2024-04-30
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
## 2023-09-28 NOTE — PATIENT INSTRUCTIONS
" [facility]  [address] [place] [id number]  Office: [phone]   Fax:    [phone]     THOUGHT MODEL change:   Circumstances- facts - something everyone agrees on  Thoughts - what you think about the facts   Feelings - how you feel about those thoughts  Actions - what you do about those feelings  Results - what happens from those actions     Think about turning the negative thoughts into positive thoughts.   Then how you feel about your thoughts will change as well.     Look into Overeaters Anonymous again for support.   - ongoing support for your food/eating issues with accountability as well.  Perhaps not the HOW program, but something a bit less intense and more sustainable for you.       Return in about 2 months (around 11/28/2023) for recheck re: exercise counseling, chronic fatigue, fibromyalgia as a video visit, w/ Dr Story.   Future Appointments 9/28/2023 - 3/26/2024        Date Visit Type Length Department Provider     11/29/2023  5:40 PM OFFICE VISIT 40 min [facility]ACT[facility] Phuong Story MD    Location Instructions:     [facility] is located at [address] [place] [address], along [address]. Free parking is available; access the lot by [address] [facility] [address] [facility], then west onto [facility].                      Thank you so much or choosing [facility]  for your Health Care. It was a pleasure seeing you at your visit today! Please contact us with any questions or concerns you may have.                   Phuong Story MD                              To reach your [facility] care team after hours call:   [phone] press #[phone] \"to speak with your care team\".  This will get you to our clinic instead of routing to central [facility]  scheduling.     PLEASE NOTE OUR HOURS HAVE CHANGED secondary to COVID-19 coronavirus pandemic, as " we are trying to minimize patient exposure to the virus,  which is now widespread in the state.  These hours may change with very little notice.  We apologize for any inconvenience.       Our current clinic hours are:          Monday- Thursday   7:00am - 6:00pm  in person.      Friday  7:00am- 5:00pm                       Saturday and Sunday : Closed to in person and virtual visits        We have telephone and virtual visit times available between    7:00am - 6pm on Monday-Friday as well.                                                Phone:  [phone]      Our pharmacy hours: Monday through Friday 8:00am to 5:00pm                        Saturday - 9:00 am to 12 noon       Sunday : Closed.              Phone:  [phone]              ###  Please note: at this time we are not accepting any walk-in visits. ###      There is also information available at our web site:  [email or web address]    If your provider ordered any lab tests and you do not receive the results within 10 business days, please call the clinic.    If you need a medication refill please contact your pharmacy.  Please allow 3 business days for your refill to be completed.    Our clinic offers telephone visits and e visits.  Please ask one of your team members to explain more.      Use [facility] (secure email communication and access to your chart) to send your primary care provider a message or make an appointment. Ask someone on your Team how to sign up for [facility].

## 2023-09-29 ENCOUNTER — TELEPHONE (OUTPATIENT)
Dept: FAMILY MEDICINE | Facility: CLINIC | Age: 68
End: 2023-09-29
Payer: COMMERCIAL

## 2023-09-29 LAB
ALT SERPL W P-5'-P-CCNC: 25 U/L (ref 0–50)
AST SERPL W P-5'-P-CCNC: 35 U/L (ref 0–45)
TSH SERPL DL<=0.005 MIU/L-ACNC: 3.32 UIU/ML (ref 0.3–4.2)

## 2023-09-29 NOTE — PROGRESS NOTES
Laboratory Request Form received  - signed by provider with no encounter?    [employer] - Has  this been  faxed to  # on form  [phone]?  Does provider want  me  to file this  or  stat scan?    Maya PATEL

## 2023-09-29 NOTE — TELEPHONE ENCOUNTER
[facility] form received from provider (signed)  No encounter.  Does  Provider  need this faxed to  or was it already faxed?    Please advise?

## 2023-10-24 ENCOUNTER — LAB (OUTPATIENT)
Dept: LAB | Facility: CLINIC | Age: 68
End: 2023-10-24
Payer: COMMERCIAL

## 2023-10-24 DIAGNOSIS — E78.5 HYPERLIPIDEMIA WITH TARGET LDL LESS THAN 130: ICD-10-CM

## 2023-10-24 LAB
CHOLEST SERPL-MCNC: 194 MG/DL
HDLC SERPL-MCNC: 52 MG/DL
LDLC SERPL CALC-MCNC: 104 MG/DL
NONHDLC SERPL-MCNC: 142 MG/DL
TRIGL SERPL-MCNC: 190 MG/DL

## 2023-10-24 PROCEDURE — 36415 COLL VENOUS BLD VENIPUNCTURE: CPT

## 2023-10-24 PROCEDURE — 80061 LIPID PANEL: CPT

## 2023-10-30 NOTE — RESULT ENCOUNTER NOTE
Dear Edwina Parsons,     Thank you for your patience in getting my comments on your recent results to you.     All your recent labs are normal, improved, or pretty stable from previous.     Please, continue your current medications and/or supplements and follow up as we discussed at your last visit.     For additional lab test information, [email or web address] is an excellent reference.    Thank you so much for choosing [facility].  Please contact us with any questions that you may have.   We appreciate the opportunity to serve you now and look forward to supporting your healthcare needs for a long time to come!    Most Sincerely,     Phuong Story MD

## 2023-11-29 ENCOUNTER — VIRTUAL VISIT (OUTPATIENT)
Dept: FAMILY MEDICINE | Facility: CLINIC | Age: 68
End: 2023-11-29
Payer: COMMERCIAL

## 2023-11-29 DIAGNOSIS — E66.811 CLASS 1 OBESITY DUE TO EXCESS CALORIES WITH SERIOUS COMORBIDITY AND BODY MASS INDEX (BMI) OF 32.0 TO 32.9 IN ADULT: Primary | ICD-10-CM

## 2023-11-29 DIAGNOSIS — E03.9 HYPOTHYROIDISM, UNSPECIFIED TYPE: ICD-10-CM

## 2023-11-29 DIAGNOSIS — M79.7 FIBROMYALGIA: ICD-10-CM

## 2023-11-29 DIAGNOSIS — E66.09 CLASS 1 OBESITY DUE TO EXCESS CALORIES WITH SERIOUS COMORBIDITY AND BODY MASS INDEX (BMI) OF 32.0 TO 32.9 IN ADULT: Primary | ICD-10-CM

## 2023-11-29 DIAGNOSIS — E55.9 VITAMIN D DEFICIENCY: ICD-10-CM

## 2023-11-29 DIAGNOSIS — E78.5 HYPERLIPIDEMIA WITH TARGET LDL LESS THAN 130: ICD-10-CM

## 2023-11-29 DIAGNOSIS — K20.0 EOSINOPHILIC ESOPHAGITIS: ICD-10-CM

## 2023-11-29 PROCEDURE — 99214 OFFICE O/P EST MOD 30 MIN: CPT | Mod: VID | Performed by: FAMILY MEDICINE
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
## 2024-02-20 ENCOUNTER — TELEPHONE (OUTPATIENT)
Dept: FAMILY MEDICINE | Facility: CLINIC | Age: 69
End: 2024-02-20
Payer: COMMERCIAL

## 2024-02-20 NOTE — TELEPHONE ENCOUNTER
FYI - Status Update    Who is Calling: Pt wanted to schedule labs for appt 3/20 - lab appt is 3/18 - fasting at 815am.  Needs orders.    Update: Annual with provider 3/20 and labs need to be ordered for 3/18 815am    Does caller want a call/response back: na    Patient stated that she had to cancel a few times due to provider being out.  I scheduled her for 40 min for the annual and she said you wanted labs for her appt also.    Thank you.    Maya PATEL

## 2024-02-29 ENCOUNTER — DOCUMENTATION ONLY (OUTPATIENT)
Dept: FAMILY MEDICINE | Facility: CLINIC | Age: 69
End: 2024-02-29
Payer: COMMERCIAL

## 2024-02-29 DIAGNOSIS — E03.8 SUBCLINICAL HYPOTHYROIDISM: ICD-10-CM

## 2024-02-29 RX ORDER — LEVOTHYROXINE SODIUM 25 UG/1
25 TABLET ORAL DAILY
Qty: 90 TABLET | Refills: 0 | Status: SHIPPED | OUTPATIENT
Start: 2024-02-29 | End: 2024-07-11 | Stop reason: DRUGHIGH

## 2024-02-29 NOTE — PROGRESS NOTES
Edwina Zhou has an upcoming lab appointment:    Future Appointments   Date Time Provider Department Center   3/18/2024  8:15 AM RV LAB [facility]   3/20/2024 11:20 AM Phuong Story MD [facility]     Patient is scheduled for the following lab(s):     There is no order available. Please review and place either future orders or HMPO (Review of Health Maintenance Protocol Orders), as appropriate.    Health Maintenance Due   Topic    ANNUAL REVIEW OF HM ORDERS      Negra Muller

## 2024-03-01 ENCOUNTER — TELEPHONE (OUTPATIENT)
Dept: FAMILY MEDICINE | Facility: CLINIC | Age: 69
End: 2024-03-01
Payer: COMMERCIAL

## 2024-03-01 NOTE — TELEPHONE ENCOUNTER
Reason for Call:  Appointment Request    Patient requesting this type of appt:  Preventive     Requested provider: Phuong Story    Reason patient unable to be scheduled: Not within requested timeframe    When does patient want to be seen/preferred time:  Open would like sooner then July     Comments: The one she has set up won't work with her work schedule     Could we send this information to you in "[employer] or would you prefer to receive a phone call?:   Patient would prefer a phone call   Okay to leave a detailed message?: Yes at Cell number on file:    Telephone Information:   Mobile [phone]       Call taken on 3/1/2024 at 9:59 AM by Priscilla Strauss

## 2024-03-17 ENCOUNTER — HEALTH MAINTENANCE LETTER (OUTPATIENT)
Age: 69
End: 2024-03-17

## 2024-03-18 ENCOUNTER — LAB (OUTPATIENT)
Dept: LAB | Facility: CLINIC | Age: 69
End: 2024-03-18
Payer: COMMERCIAL

## 2024-03-18 DIAGNOSIS — E03.9 HYPOTHYROIDISM, UNSPECIFIED TYPE: ICD-10-CM

## 2024-03-18 DIAGNOSIS — E55.9 VITAMIN D DEFICIENCY: ICD-10-CM

## 2024-03-18 DIAGNOSIS — E78.5 HYPERLIPIDEMIA WITH TARGET LDL LESS THAN 130: ICD-10-CM

## 2024-03-18 DIAGNOSIS — K20.0 EOSINOPHILIC ESOPHAGITIS: ICD-10-CM

## 2024-03-18 DIAGNOSIS — M79.7 FIBROMYALGIA: ICD-10-CM

## 2024-03-18 LAB
ALBUMIN SERPL BCG-MCNC: 4.5 G/DL (ref 3.5–5.2)
ALP SERPL-CCNC: 68 U/L (ref 40–150)
ALT SERPL W P-5'-P-CCNC: 25 U/L (ref 0–50)
ANION GAP SERPL CALCULATED.3IONS-SCNC: 10 MMOL/L (ref 7–15)
AST SERPL W P-5'-P-CCNC: 29 U/L (ref 0–45)
BILIRUB SERPL-MCNC: 0.4 MG/DL
BUN SERPL-MCNC: 14.7 MG/DL (ref 8–23)
CALCIUM SERPL-MCNC: 9.2 MG/DL (ref 8.8–10.2)
CHLORIDE SERPL-SCNC: 105 MMOL/L (ref 98–107)
CHOLEST SERPL-MCNC: 184 MG/DL
CREAT SERPL-MCNC: 0.77 MG/DL (ref 0.51–0.95)
CREAT UR-MCNC: 8.7 MG/DL
DEPRECATED HCO3 PLAS-SCNC: 26 MMOL/L (ref 22–29)
EGFRCR SERPLBLD CKD-EPI 2021: 85 ML/MIN/1.73M2
ERYTHROCYTE [DISTWIDTH] IN BLOOD BY AUTOMATED COUNT: 12.5 % (ref 10–15)
FASTING STATUS PATIENT QL REPORTED: YES
GLUCOSE SERPL-MCNC: 108 MG/DL (ref 70–99)
HCT VFR BLD AUTO: 38.7 % (ref 35–47)
HDLC SERPL-MCNC: 58 MG/DL
HGB BLD-MCNC: 13.2 G/DL (ref 11.7–15.7)
LDLC SERPL CALC-MCNC: 90 MG/DL
MCH RBC QN AUTO: 30.8 PG (ref 26.5–33)
MCHC RBC AUTO-ENTMCNC: 34.1 G/DL (ref 31.5–36.5)
MCV RBC AUTO: 90 FL (ref 78–100)
MICROALBUMIN UR-MCNC: <12 MG/L
MICROALBUMIN/CREAT UR: NORMAL MG/G{CREAT}
NONHDLC SERPL-MCNC: 126 MG/DL
PLATELET # BLD AUTO: 282 10E3/UL (ref 150–450)
POTASSIUM SERPL-SCNC: 4.4 MMOL/L (ref 3.4–5.3)
PROT SERPL-MCNC: 7 G/DL (ref 6.4–8.3)
RBC # BLD AUTO: 4.28 10E6/UL (ref 3.8–5.2)
SODIUM SERPL-SCNC: 141 MMOL/L (ref 135–145)
T3 SERPL-MCNC: 145 NG/DL (ref 85–202)
T4 FREE SERPL-MCNC: 0.95 NG/DL (ref 0.9–1.7)
TRIGL SERPL-MCNC: 179 MG/DL
TSH SERPL DL<=0.005 MIU/L-ACNC: 3.59 UIU/ML (ref 0.3–4.2)
WBC # BLD AUTO: 6.1 10E3/UL (ref 4–11)

## 2024-03-18 PROCEDURE — 82043 UR ALBUMIN QUANTITATIVE: CPT

## 2024-03-18 PROCEDURE — 82570 ASSAY OF URINE CREATININE: CPT

## 2024-03-18 PROCEDURE — 82306 VITAMIN D 25 HYDROXY: CPT

## 2024-03-18 PROCEDURE — 85027 COMPLETE CBC AUTOMATED: CPT

## 2024-03-18 PROCEDURE — 80061 LIPID PANEL: CPT

## 2024-03-18 PROCEDURE — 84480 ASSAY TRIIODOTHYRONINE (T3): CPT

## 2024-03-18 PROCEDURE — 84443 ASSAY THYROID STIM HORMONE: CPT

## 2024-03-18 PROCEDURE — 80053 COMPREHEN METABOLIC PANEL: CPT

## 2024-03-18 PROCEDURE — 84439 ASSAY OF FREE THYROXINE: CPT

## 2024-03-18 PROCEDURE — 36415 COLL VENOUS BLD VENIPUNCTURE: CPT

## 2024-03-19 LAB
DEPRECATED CALCIDIOL+CALCIFEROL SERPL-MC: <74 UG/L (ref 20–75)
VITAMIN D2 SERPL-MCNC: <5 UG/L
VITAMIN D3 SERPL-MCNC: 69 UG/L

## 2024-04-11 NOTE — RESULT ENCOUNTER NOTE
Dear Edwina Parsons,    Here is a summary of your recent test results:  -Normal red blood cell (hgb) levels, normal white blood cell count and normal platelet levels.  -Liver and gallbladder tests (ALT,AST, Alk phos,bilirubin) are normal.  -Kidney function (GFR) is normal.  -Sodium is normal.  -Potassium is normal.  -Calcium is normal.  -Glucose is slight elevated and may be a sign of early diabetes (prediabetes). ADVISE:: eating a low carbohydrate diet, exercising, trying to lose weight (if necessary) and rechecking your glucose level in 12 months.  -TSH (thyroid stimulating hormone) level is normal which indicates appropriate thyroid replacement dosing.  ADVISE: continuing same replacement dose and rechecking this in 12 months.  -Microalbumin (urine protein) test is normal.  ADVISE: rechecking this annually.  -Vitamin D level is normal, 2000 IU daily in diet or supplements is recommended.       For additional lab test information, www.[employer].com is a very good reference.    In addition, here is a list of due or overdue Health Maintenance reminders:  RSV VACCINE (Pregnancy & 60+)(1 - 1-dose 60+ series) Never done  COVID-19 Vaccine(6 - 2023-24 season) due on 10/10/2023  PHQ-2 (once per calendar year) due on 01/01/2024  Annual Wellness Visit due on 02/13/2024  ANNUAL REVIEW OF HM ORDERS due on 02/13/2024  Mammogram due on 02/14/2024    Please call us at [phone] (or use [employer]) to address the above recommendations if needed.           Thank you very much for trusting me and [facility].     Have a peaceful day.    Healthy regards,  Andrey Ryan MD

## 2024-04-29 SDOH — HEALTH STABILITY: PHYSICAL HEALTH: ON AVERAGE, HOW MANY MINUTES DO YOU ENGAGE IN EXERCISE AT THIS LEVEL?: 20 MIN

## 2024-04-29 SDOH — HEALTH STABILITY: PHYSICAL HEALTH: ON AVERAGE, HOW MANY DAYS PER WEEK DO YOU ENGAGE IN MODERATE TO STRENUOUS EXERCISE (LIKE A BRISK WALK)?: 4 DAYS

## 2024-04-29 ASSESSMENT — SOCIAL DETERMINANTS OF HEALTH (SDOH): HOW OFTEN DO YOU GET TOGETHER WITH FRIENDS OR RELATIVES?: ONCE A WEEK

## 2024-04-30 ENCOUNTER — TELEPHONE (OUTPATIENT)
Dept: FAMILY MEDICINE | Facility: CLINIC | Age: 69
End: 2024-04-30

## 2024-04-30 ENCOUNTER — OFFICE VISIT (OUTPATIENT)
Dept: FAMILY MEDICINE | Facility: CLINIC | Age: 69
End: 2024-04-30
Payer: COMMERCIAL

## 2024-04-30 VITALS
BODY MASS INDEX: 33.61 KG/M2 | HEART RATE: 60 BPM | OXYGEN SATURATION: 99 % | SYSTOLIC BLOOD PRESSURE: 122 MMHG | TEMPERATURE: 98 F | DIASTOLIC BLOOD PRESSURE: 76 MMHG | WEIGHT: 178 LBS | HEIGHT: 61 IN | RESPIRATION RATE: 14 BRPM

## 2024-04-30 DIAGNOSIS — R73.01 ELEVATED FASTING GLUCOSE: ICD-10-CM

## 2024-04-30 DIAGNOSIS — E03.9 HYPOTHYROIDISM, UNSPECIFIED TYPE: ICD-10-CM

## 2024-04-30 DIAGNOSIS — Z12.31 VISIT FOR SCREENING MAMMOGRAM: ICD-10-CM

## 2024-04-30 DIAGNOSIS — E66.3 OVERWEIGHT: ICD-10-CM

## 2024-04-30 DIAGNOSIS — E78.5 HYPERLIPIDEMIA WITH TARGET LDL LESS THAN 130: ICD-10-CM

## 2024-04-30 DIAGNOSIS — Z00.00 ROUTINE GENERAL MEDICAL EXAMINATION AT A HEALTH CARE FACILITY: Primary | ICD-10-CM

## 2024-04-30 DIAGNOSIS — Z71.82 EXERCISE COUNSELING: ICD-10-CM

## 2024-04-30 LAB
FASTING STATUS PATIENT QL REPORTED: YES
GLUCOSE SERPL-MCNC: 100 MG/DL (ref 70–99)
HBA1C MFR BLD: 5.9 % (ref 0–5.6)

## 2024-04-30 PROCEDURE — 99397 PER PM REEVAL EST PAT 65+ YR: CPT | Performed by: NURSE PRACTITIONER

## 2024-04-30 PROCEDURE — 36415 COLL VENOUS BLD VENIPUNCTURE: CPT | Performed by: NURSE PRACTITIONER

## 2024-04-30 PROCEDURE — 83036 HEMOGLOBIN GLYCOSYLATED A1C: CPT | Performed by: NURSE PRACTITIONER

## 2024-04-30 PROCEDURE — 82947 ASSAY GLUCOSE BLOOD QUANT: CPT | Performed by: NURSE PRACTITIONER

## 2024-04-30 RX ORDER — LEVOTHYROXINE SODIUM 50 UG/1
50 TABLET ORAL DAILY
Qty: 90 TABLET | Refills: 3 | Status: SHIPPED | OUTPATIENT
Start: 2024-04-30

## 2024-04-30 RX ORDER — PHENTERMINE HYDROCHLORIDE 30 MG/1
30 CAPSULE ORAL EVERY MORNING
Qty: 30 CAPSULE | Refills: 1 | Status: SHIPPED | OUTPATIENT
Start: 2024-04-30 | End: 2024-06-27

## 2024-04-30 RX ORDER — ROSUVASTATIN CALCIUM 5 MG/1
5 TABLET, COATED ORAL DAILY
Qty: 90 TABLET | Refills: 3 | Status: SHIPPED | OUTPATIENT
Start: 2024-04-30

## 2024-04-30 ASSESSMENT — ANXIETY QUESTIONNAIRES
6. BECOMING EASILY ANNOYED OR IRRITABLE: SEVERAL DAYS
8. IF YOU CHECKED OFF ANY PROBLEMS, HOW DIFFICULT HAVE THESE MADE IT FOR YOU TO DO YOUR WORK, TAKE CARE OF THINGS AT HOME, OR GET ALONG WITH OTHER PEOPLE?: SOMEWHAT DIFFICULT
IF YOU CHECKED OFF ANY PROBLEMS ON THIS QUESTIONNAIRE, HOW DIFFICULT HAVE THESE PROBLEMS MADE IT FOR YOU TO DO YOUR WORK, TAKE CARE OF THINGS AT HOME, OR GET ALONG WITH OTHER PEOPLE: SOMEWHAT DIFFICULT
5. BEING SO RESTLESS THAT IT IS HARD TO SIT STILL: NOT AT ALL
4. TROUBLE RELAXING: NOT AT ALL
GAD7 TOTAL SCORE: 5
2. NOT BEING ABLE TO STOP OR CONTROL WORRYING: SEVERAL DAYS
GAD7 TOTAL SCORE: 5
GAD7 TOTAL SCORE: 5
7. FEELING AFRAID AS IF SOMETHING AWFUL MIGHT HAPPEN: SEVERAL DAYS
7. FEELING AFRAID AS IF SOMETHING AWFUL MIGHT HAPPEN: SEVERAL DAYS
3. WORRYING TOO MUCH ABOUT DIFFERENT THINGS: SEVERAL DAYS
1. FEELING NERVOUS, ANXIOUS, OR ON EDGE: SEVERAL DAYS

## 2024-04-30 ASSESSMENT — PAIN SCALES - GENERAL: PAINLEVEL: NO PAIN (0)

## 2024-04-30 ASSESSMENT — PATIENT HEALTH QUESTIONNAIRE - PHQ9
SUM OF ALL RESPONSES TO PHQ QUESTIONS 1-9: 8
SUM OF ALL RESPONSES TO PHQ QUESTIONS 1-9: 8
10. IF YOU CHECKED OFF ANY PROBLEMS, HOW DIFFICULT HAVE THESE PROBLEMS MADE IT FOR YOU TO DO YOUR WORK, TAKE CARE OF THINGS AT HOME, OR GET ALONG WITH OTHER PEOPLE: SOMEWHAT DIFFICULT

## 2024-04-30 NOTE — PROGRESS NOTES
Preventive Care Visit  [facility] [place]  Leisa Garza CNP, Nurse Practitioner - Family  Apr 30, 2024      Assessment & Plan     Routine general medical examination at a health care facility    Visit for screening mammogram  - MA SCREENING DIGITAL BILAT - Future  (s+30)    Hyperlipidemia with target LDL less than 130 - needs labs rechecked - is fasting today   - rosuvastatin (CRESTOR) 5 MG tablet  Dispense: 90 tablet; Refill: 3    Overweight  BMI 33.0-33.9,adult  - Hemoglobin A1c  - Glucose  - Nutrition Referral  - Hemoglobin A1c  - Glucose    Hypothyroidism, unspecified type- needs lab follow up and ? refills   - levothyroxine (SYNTHROID/LEVOTHROID) 50 MCG tablet  Dispense: 90 tablet; Refill: 3    Elevated fasting glucose  - Glucose  - Glucose      Patient has been advised of split billing requirements and indicates understanding: Yes  Review of the result(s) of each unique test - lab  Ordering of each unique test  Prescription drug management        Counseling  Appropriate preventive services were discussed with this patient, including applicable screening as appropriate for fall prevention, nutrition, physical activity, Tobacco-use cessation, weight loss and cognition.  Checklist reviewing preventive services available has been given to the patient.  Reviewed patient's diet, addressing concerns and/or questions.   Discussed possible causes of fatigue. Patient reported safety concerns were addressed today.The patient's PHQ-9 score is consistent with mild depression. She was provided with information regarding depression.       Subjective   Edwina Parsons is a 68 year old, presenting for the following:  Physical        4/30/2024     7:05 AM   Additional Questions   Roomed by SANDRA SOLITARIO   Accompanied by SELF        Health Care Directive  Patient has a Health Care Directive on file  Advance care planning document is on file and is current.    HPI      Hyperlipidemia Follow-Up    Are you regularly taking any  medication or supplement to lower your cholesterol?   Yes- Crestor  Are you having muscle aches or other side effects that you think could be caused by your cholesterol lowering medication?  No    Hypothyroidism Follow-up    Since last visit, patient describes the following symptoms: Weight stable, no hair loss, no skin changes, no constipation, no loose stools    Borderline free hormone levels. Would like to try dose increase. Feeling fatigued.         4/29/2024   General Health   How would you rate your overall physical health? Good   Feel stress (tense, anxious, or unable to sleep) Very much   (!) STRESS CONCERN      4/29/2024   Nutrition   Diet: Vegetarian/vegan    Gluten-free/reduced         4/29/2024   Exercise   Days per week of moderate/strenous exercise 4 days   Average minutes spent exercising at this level 20 min         4/29/2024   Social Factors   Frequency of gathering with friends or relatives Once a week   Worry food won't last until get money to buy more No   Food not last or not have enough money for food? No   Do you have housing?  Yes   Are you worried about losing your housing? No   Lack of transportation? No   Unable to get utilities (heat,electricity)? No         4/30/2024   Fall Risk   Gait Speed Test (Document in seconds) 5   Gait Speed Test Interpretation Less than or equal to 5.00 seconds - PASS          4/29/2024   Activities of Daily Living- Home Safety   Needs help with the following daily activites None of the above   Safety concerns in the home Throw rugs in the hallway    No grab bars in the bathroom         4/29/2024   Dental   Dentist two times every year? Yes         4/29/2024   Hearing Screening   Hearing concerns? None of the above         4/29/2024   Driving Risk Screening   Patient/family members have concerns about driving No         4/29/2024   General Alertness/Fatigue Screening   Have you been more tired than usual lately? (!) YES         4/29/2024   Urinary Incontinence  Screening   Bothered by leaking urine in past 6 months No         4/29/2024   TB Screening   Were you born outside of the [place]? No       Today's PHQ-9 Score:       4/30/2024     6:53 AM   PHQ-9 SCORE   PHQ-9 Total Score MyChart 8 (Mild depression)   PHQ-9 Total Score 8         4/29/2024   Substance Use   Alcohol more than 3/day or more than 7/wk No   Do you have a current opioid prescription? No   How severe/bad is pain from 1 to 10? 7/10   Do you use any other substances recreationally? No     Social History     Tobacco Use    Smoking status: Never    Smokeless tobacco: Never   Vaping Use    Vaping status: Never Used   Substance Use Topics    Alcohol use: Yes     Comment: Occasional    Drug use: No           2/14/2023   LAST FHS-7 RESULTS   1st degree relative breast or ovarian cancer No   Any relative bilateral breast cancer No   Any male have breast cancer No   Any ONE woman have BOTH breast AND ovarian cancer No   Any woman with breast cancer before 50yrs No   2 or more relatives with breast AND/OR ovarian cancer No   2 or more relatives with breast AND/OR bowel cancer No        Mammogram Screening - Mammogram every 1-2 years updated in Health Maintenance based on mutual decision making    ASCVD Risk   The 10-year ASCVD risk score (Saman ARANA, et al., 2019) is: 5.3%    Values used to calculate the score:      Age: 68 years      Sex: Female      Is Non- : No      Diabetic: No      Tobacco smoker: No      Systolic Blood Pressure: 122 mmHg      Is BP treated: No      HDL Cholesterol: 58 mg/dL      Total Cholesterol: 184 mg/dL            Reviewed and updated as needed this visit by Provider                    Lab work is in process  Current providers sharing in care for this patient include:  Patient Care Team:  Phuong Story MD as PCP - General  Phuong Story MD as Assigned PCP  Yovany Martínez MD as MD (Dermatology)  Yeo, Albert, MD as Assigned Musculoskeletal  "Provider  Yovany Martínez MD as Assigned Surgical Provider    The following health maintenance items are reviewed in Epic and correct as of today:  Health Maintenance   Topic Date Due    RSV VACCINE (Pregnancy & 60+) (1 - 1-dose 60+ series) Never done    COVID-19 Vaccine (6 - 2023-24 season) 10/10/2023    MAMMO SCREENING  02/14/2024    TSH W/FREE T4 REFLEX  03/18/2025    MEDICARE ANNUAL WELLNESS VISIT  04/30/2025    ANNUAL REVIEW OF HM ORDERS  04/30/2025    FALL RISK ASSESSMENT  04/30/2025    GLUCOSE  03/18/2027    DTAP/TDAP/TD IMMUNIZATION (3 - Td or Tdap) 11/05/2028    LIPID  03/18/2029    ADVANCE CARE PLANNING  04/30/2029    COLORECTAL CANCER SCREENING  08/02/2029    DEXA  04/11/2038    HEPATITIS C SCREENING  Completed    PHQ-2 (once per calendar year)  Completed    INFLUENZA VACCINE  Completed    Pneumococcal Vaccine: 65+ Years  Completed    ZOSTER IMMUNIZATION  Completed    IPV IMMUNIZATION  Aged Out    HPV IMMUNIZATION  Aged Out    MENINGITIS IMMUNIZATION  Aged Out    RSV MONOCLONAL ANTIBODY  Aged Out    PAP  Discontinued         Review of Systems  Constitutional, HEENT, cardiovascular, pulmonary, GI, , musculoskeletal, neuro, skin, endocrine and psych systems are negative, except as otherwise noted.     Objective    Exam  /76   Pulse 60   Temp 98  F (36.7  C) (Tympanic)   Resp 14   Ht 1.549 m (5' 1\")   Wt 80.7 kg (178 lb)   LMP 06/03/2010   SpO2 99%   BMI 33.63 kg/m     Estimated body mass index is 33.63 kg/m  as calculated from the following:    Height as of this encounter: 1.549 m (5' 1\").    Weight as of this encounter: 80.7 kg (178 lb).    Physical Exam  GENERAL: alert and no distress  EYES: Eyes grossly normal to inspection, PERRL and conjunctivae and sclerae normal  HENT: ear canals and TM's normal, nose and mouth without ulcers or lesions  NECK: no adenopathy, no asymmetry, masses, or scars  RESP: lungs clear to auscultation - no rales, rhonchi or wheezes  CV: regular rate and rhythm, " normal S1 S2, no S3 or S4, no murmur, click or rub, no peripheral edema  ABDOMEN: soft, nontender, no hepatosplenomegaly, no masses and bowel sounds normal  MS: no gross musculoskeletal defects noted, no edema  SKIN: no suspicious lesions or rashes  NEURO: Normal strength and tone, mentation intact and speech normal  PSYCH: mentation appears normal, affect normal/bright         4/30/2024   Mini Cog   Clock Draw Score 2 Normal   3 Item Recall 3 objects recalled   Mini Cog Total Score 5             Signed Electronically by: Leisa Garza, CNP

## 2024-04-30 NOTE — PATIENT INSTRUCTIONS
Preventive Care Advice   This is general advice given by our system to help you stay healthy. However, your care team may have specific advice just for you. Please talk to your care team about your preventive care needs.  Nutrition  Eat 5 or more servings of fruits and vegetables each day.  Try wheat bread, brown rice and whole grain pasta (instead of white bread, rice, and pasta).  Get enough calcium and vitamin D. Check the label on foods and aim for 100% of the RDA (recommended daily allowance).  Lifestyle  Exercise at least 150 minutes each week   (30 minutes a day, 5 days a week).  Do muscle strengthening activities 2 days a week. These help control your weight and prevent disease.  No smoking.  Wear sunscreen to prevent skin cancer.  Have a dental exam and cleaning every 6 months.  Yearly exams  See your health care team every year to talk about:  Any changes in your health.  Any medicines your care team has prescribed.  Preventive care, family planning, and ways to prevent chronic diseases.  Shots (vaccines)   HPV shots (up to age 26), if you've never had them before.  Hepatitis B shots (up to age 59), if you've never had them before.  COVID-19 shot: Get this shot when it's due.  Flu shot: Get a flu shot every year.  Tetanus shot: Get a tetanus shot every 10 years.  Pneumococcal, hepatitis A, and RSV shots: Ask your care team if you need these based on your risk.  Shingles shot (for age 50 and up).  General health tests  Diabetes screening:  Starting at age 35, Get screened for diabetes at least every 3 years.  If you are younger than age 35, ask your care team if you should be screened for diabetes.  Cholesterol test: At age 39, start having a cholesterol test every 5 years, or more often if advised.  Bone density scan (DEXA): At age 50, ask your care team if you should have this scan for osteoporosis (brittle bones).  Hepatitis C: Get tested at least once in your life.  STIs (sexually transmitted  infections)  Before age 24: Ask your care team if you should be screened for STIs.  After age 24: Get screened for STIs if you're at risk. You are at risk for STIs (including HIV) if:  You are sexually active with more than one person.  You don't use condoms every time.  You or a partner was diagnosed with a sexually transmitted infection.  If you are at risk for HIV, ask about PrEP medicine to prevent HIV.  Get tested for HIV at least once in your life, whether you are at risk for HIV or not.  Cancer screening tests  Cervical cancer screening: If you have a cervix, begin getting regular cervical cancer screening tests at age 21. Most people who have regular screenings with normal results can stop after age 65. Talk about this with your provider.  Breast cancer scan (mammogram): If you've ever had breasts, begin having regular mammograms starting at age 40. This is a scan to check for breast cancer.  Colon cancer screening: It is important to start screening for colon cancer at age 45.  Have a colonoscopy test every 10 years (or more often if you're at risk) Or, ask your provider about stool tests like a FIT test every year or Cologuard test every 3 years.  To learn more about your testing options, visit: https://www.Textic/039210.pdf.  For help making a decision, visit: https://bit.ly/fk91436.  Prostate cancer screening test: If you have a prostate and are age 55 to 69, ask your provider if you would benefit from a yearly prostate cancer screening test.  Lung cancer screening: If you are a current or former smoker age 50 to 80, ask your care team if ongoing lung cancer screenings are right for you.  For informational purposes only. Not to replace the advice of your health care provider. Copyright   2023 [employer]. All rights reserved. Clinically reviewed by the [facility]. [employer] [id number] - REV 01/24.    Learning About Activities of Daily Living  What are activities  of daily living?     Activities of daily living (ADLs) are the basic self-care tasks you do every day. These include eating, bathing, dressing, and moving around.  As you age, and if you have health problems, you may find that it's harder to do some of these tasks. If so, your doctor can suggest ideas that may help.  To measure what kind of help you may need, your doctor will ask how well you are able to do ADLs. Let your doctor know if there are any tasks that you are having trouble doing. This is an important first step to getting help. And when you have the help you need, you can stay as independent as possible.  How will a doctor assess your ADLs?  Asking about ADLs is part of a routine health checkup your doctor will likely do as you age. Your health check might be done in a doctor's office, in your home, or at a hospital. The goal is to find out if you are having any problems that could make it hard to care for yourself or that make it unsafe for you to be on your own.  To measure your ADLs, your doctor will ask how hard it is for you to do routine tasks. Your doctor may also want to know if you have changed the way you do a task because of a health problem. Your doctor may watch how you:  Walk back and forth.  Keep your balance while you stand or walk.  Move from sitting to standing or from a bed to a chair.  Button or unbutton a shirt or sweater.  Remove and put on your shoes.  It's common to feel a little worried or anxious if you find you can't do all the things you used to be able to do. Talking with your doctor about ADLs is a way to make sure you're as safe as possible and able to care for yourself as well as you can. You may want to bring a caregiver, friend, or family member to your checkup. They can help you talk to your doctor.  Follow-up care is a key part of your treatment and safety. Be sure to make and go to all appointments, and call your doctor if you are having problems. It's also a good idea  to know your test results and keep a list of the medicines you take.  Current as of: October 24, 2023               Content Version: 14.0    1037-6716 [employer].   Care instructions adapted under license by your healthcare professional. If you have questions about a medical condition or this instruction, always ask your healthcare professional. [employer] disclaims any warranty or liability for your use of this information.      Preventing Falls: Care Instructions  Injuries and health problems such as trouble walking or poor eyesight can increase your risk of falling. So can some medicines. But there are things you can do to help prevent falls. You can exercise to get stronger. You can also arrange your home to make it safer.    Talk to your doctor about the medicines you take. Ask if any of them increase the risk of falls and whether they can be changed or stopped.   Try to exercise regularly. It can help improve your strength and balance. This can help lower your risk of falling.     Practice fall safety and prevention.    Wear low-heeled shoes that fit well and give your feet good support. Talk to your doctor if you have foot problems that make this hard.  Carry a cellphone or wear a medical alert device that you can use to call for help.  Use stepladders instead of chairs to reach high objects. Don't climb if you're at risk for falls. Ask for help, if needed.  Wear the correct eyeglasses, if you need them.    Make your home safer.    Remove rugs, cords, clutter, and furniture from walkways.  Keep your house well lit. Use night-lights in hallways and bathrooms.  Install and use sturdy handrails on stairways.  Wear nonskid footwear, even inside. Don't walk barefoot or in socks without shoes.    Be safe outside.    Use handrails, curb cuts, and ramps whenever possible.  Keep your hands free by using a shoulder bag or backpack.  Try to walk in well-lit areas. Watch out for uneven ground,  "changes in pavement, and debris.  Be careful in the winter. Walk on the grass or gravel when sidewalks are slippery. Use de-icer on steps and walkways. Add non-slip devices to shoes.    Put grab bars and nonskid mats in your shower or tub and near the toilet. Try to use a shower chair or bath bench when bathing.   Get into a tub or shower by putting in your weaker leg first. Get out with your strong side first. Have a phone or medical alert device in the bathroom with you.   Where can you learn more?  Go to https://www.[employer]/patiented  Enter G117 in the search box to learn more about \"Preventing Falls: Care Instructions.\"  Current as of: July 17, 2023               Content Version: 14.0    8491-2831 [employer].   Care instructions adapted under license by your healthcare professional. If you have questions about a medical condition or this instruction, always ask your healthcare professional. [employer] disclaims any warranty or liability for your use of this information.      Learning About Stress  What is stress?     Stress is your body's response to a hard situation. Your body can have a physical, emotional, or mental response. Stress is a fact of life for most people, and it affects everyone differently. What causes stress for you may not be stressful for someone else.  A lot of things can cause stress. You may feel stress when you go on a job interview, take a test, or run a race. This kind of short-term stress is normal and even useful. It can help you if you need to work hard or react quickly. For example, stress can help you finish an important job on time.  Long-term stress is caused by ongoing stressful situations or events. Examples of long-term stress include long-term health problems, ongoing problems at work, or conflicts in your family. Long-term stress can harm your health.  How does stress affect your health?  When you are stressed, your body responds as though " you are in danger. It makes hormones that speed up your heart, make you breathe faster, and give you a burst of energy. This is called the fight-or-flight stress response. If the stress is over quickly, your body goes back to normal and no harm is done.  But if stress happens too often or lasts too long, it can have bad effects. Long-term stress can make you more likely to get sick, and it can make symptoms of some diseases worse. If you tense up when you are stressed, you may develop neck, shoulder, or low back pain. Stress is linked to high blood pressure and heart disease.  Stress also harms your emotional health. It can make you jackson, tense, or depressed. Your relationships may suffer, and you may not do well at work or school.  What can you do to manage stress?  You can try these things to help manage stress:   Do something active. Exercise or activity can help reduce stress. Walking is a great way to get started. Even everyday activities such as housecleaning or yard work can help.  Try yoga or meet chi. These techniques combine exercise and meditation. You may need some training at first to learn them.  Do something you enjoy. For example, listen to music or go to a movie. Practice your hobby or do volunteer work.  Meditate. This can help you relax, because you are not worrying about what happened before or what may happen in the future.  Do guided imagery. Imagine yourself in any setting that helps you feel calm. You can use online videos, books, or a teacher to guide you.  Do breathing exercises. For example:  From a standing position, bend forward from the waist with your knees slightly bent. Let your arms dangle close to the floor.  Breathe in slowly and deeply as you return to a standing position. Roll up slowly and lift your head last.  Hold your breath for just a few seconds in the standing position.  Breathe out slowly and bend forward from the waist.  Let your feelings out. Talk, laugh, cry, and  "express anger when you need to. Talking with supportive friends or family, a counselor, or a angelika leader about your feelings is a healthy way to relieve stress. Avoid discussing your feelings with people who make you feel worse.  Write. It may help to write about things that are bothering you. This helps you find out how much stress you feel and what is causing it. When you know this, you can find better ways to cope.  What can you do to prevent stress?  You might try some of these things to help prevent stress:  Manage your time. This helps you find time to do the things you want and need to do.  Get enough sleep. Your body recovers from the stresses of the day while you are sleeping.  Get support. Your family, friends, and community can make a difference in how you experience stress.  Limit your news feed. Avoid or limit time on social media or news that may make you feel stressed.  Do something active. Exercise or activity can help reduce stress. Walking is a great way to get started.  Where can you learn more?  Go to https://www.[employer].net/patiented  Enter N032 in the search box to learn more about \"Learning About Stress.\"  Current as of: October 24, 2023               Content Version: 14.0    5295-0609 [employer].   Care instructions adapted under license by your healthcare professional. If you have questions about a medical condition or this instruction, always ask your healthcare professional. [employer] disclaims any warranty or liability for your use of this information.      Learning About Sleeping Well  What does sleeping well mean?     Sleeping well means getting enough sleep to feel good and stay healthy. How much sleep is enough varies among people.  The number of hours you sleep and how you feel when you wake up are both important. If you do not feel refreshed, you probably need more sleep. Another sign of not getting enough sleep is feeling tired during the " "day.  Experts recommend that adults get at least 7 or more hours of sleep per day. Children and older adults need more sleep.  Why is getting enough sleep important?  Getting enough quality sleep is a basic part of good health. When your sleep suffers, your physical health, mood, and your thoughts can suffer too. You may find yourself feeling more grumpy or stressed. Not getting enough sleep also can lead to serious problems, including injury, accidents, anxiety, and depression.  What might cause poor sleeping?  Many things can cause sleep problems, including:  Changes to your sleep schedule.  Stress. Stress can be caused by fear about a single event, such as giving a speech. Or you may have ongoing stress, such as worry about work or school.  Depression, anxiety, and other mental or emotional conditions.  Changes in your sleep habits or surroundings. This includes changes that happen where you sleep, such as noise, light, or sleeping in a different bed. It also includes changes in your sleep pattern, such as having jet lag or working a late shift.  Health problems, such as pain, breathing problems, and restless legs syndrome.  Lack of regular exercise.  Using alcohol, nicotine, or caffeine before bed.  How can you help yourself?  Here are some tips that may help you sleep more soundly and wake up feeling more refreshed.  Your sleeping area   Use your bedroom only for sleeping and sex. A bit of light reading may help you fall asleep. But if it doesn't, do your reading elsewhere in the house. Try not to use your TV, computer, smartphone, or tablet while you are in bed.  Be sure your bed is big enough to stretch out comfortably, especially if you have a sleep partner.  Keep your bedroom quiet, dark, and cool. Use curtains, blinds, or a sleep mask to block out light. To block out noise, use earplugs, soothing music, or a \"white noise\" machine.  Your evening and bedtime routine   Create a relaxing bedtime routine. You " "might want to take a warm shower or bath, or listen to soothing music.  Go to bed at the same time every night. And get up at the same time every morning, even if you feel tired.  What to avoid   Limit caffeine (coffee, tea, caffeinated sodas) during the day, and don't have any for at least 6 hours before bedtime.  Avoid drinking alcohol before bedtime. Alcohol can cause you to wake up more often during the night.  Try not to smoke or use tobacco, especially in the evening. Nicotine can keep you awake.  Limit naps during the day, especially close to bedtime.  Avoid lying in bed awake for too long. If you can't fall asleep or if you wake up in the middle of the night and can't get back to sleep within about 20 minutes, get out of bed and go to another room until you feel sleepy.  Avoid taking medicine right before bed that may keep you awake or make you feel hyper or energized. Your doctor can tell you if your medicine may do this and if you can take it earlier in the day.  If you can't sleep   Imagine yourself in a peaceful, pleasant scene. Focus on the details and feelings of being in a place that is relaxing.  Get up and do a quiet or boring activity until you feel sleepy.  Avoid drinking any liquids before going to bed to help prevent waking up often to use the bathroom.  Where can you learn more?  Go to https://www.[employer].net/patiented  Enter J942 in the search box to learn more about \"Learning About Sleeping Well.\"  Current as of: July 10, 2023               Content Version: 14.0    3727-9010 [employer].   Care instructions adapted under license by your healthcare professional. If you have questions about a medical condition or this instruction, always ask your healthcare professional. [employer] disclaims any warranty or liability for your use of this information.      Learning About Depression Screening  What is depression screening?  Depression screening is a way to see if you " "have depression symptoms. It may be done by a doctor or counselor. It's often part of a routine checkup. That's because your mental health is just as important as your physical health.  Depression is a mental health condition that affects how you feel, think, and act. You may:  Have less energy.  Lose interest in your daily activities.  Feel sad and grouchy for a long time.  Depression is very common. It affects people of all ages.  Many things can lead to depression. Some people become depressed after they have a stroke or find out they have a major illness like cancer or heart disease. The death of a loved one or a breakup may lead to depression. It can run in families. Most experts believe that a combination of inherited genes and stressful life events can cause it.  What happens during screening?  You may be asked to fill out a form about your depression symptoms. You and the doctor will discuss your answers. The doctor may ask you more questions to learn more about how you think, act, and feel.  What happens after screening?  If you have symptoms of depression, your doctor will talk to you about your options.  Doctors usually treat depression with medicines or counseling. Often, combining the two works best. Many people don't get help because they think that they'll get over the depression on their own. But people with depression may not get better unless they get treatment.  The cause of depression is not well understood. There may be many factors involved. But if you have depression, it's not your fault.  A serious symptom of depression is thinking about death or suicide. If you or someone you care about talks about this or about feeling hopeless, get help right away.  It's important to know that depression can be treated. Medicine, counseling, and self-care may help.  Where can you learn more?  Go to https://www.healthwise.net/patiented  Enter T185 in the search box to learn more about \"Learning About " "Depression Screening.\"  Current as of: June 24, 2023               Content Version: 14.0    1099-7615 [employer].   Care instructions adapted under license by your healthcare professional. If you have questions about a medical condition or this instruction, always ask your healthcare professional. [employer] disclaims any warranty or liability for your use of this information.      "

## 2024-04-30 NOTE — TELEPHONE ENCOUNTER
Patient calls and states that she saw Leisa Garza NP today and phentermine was discussed for weight loss, but patient doesn't think provider sent over prescription.     Patient was on phentermine 30 mg in September 2023 and patient states she discuss restarting at this dose for 2 months or 6 months with Leisa Garza.    Pended prescription. Please advise.    Thank you,  Seng Iqbal, Triage RN [facility]  2:12 PM 4/30/2024

## 2024-05-01 NOTE — TELEPHONE ENCOUNTER
Attempt # 1  Called Phone # [phone]    Your rx has been sent to the pharmacy for you, if you have further questions please call and  and ask for any available triage nurse @ [phone].     Dana HERNANDEZ RN   [facility] Triage

## 2024-05-26 ENCOUNTER — HEALTH MAINTENANCE LETTER (OUTPATIENT)
Age: 69
End: 2024-05-26

## 2024-05-28 ENCOUNTER — TELEPHONE (OUTPATIENT)
Dept: FAMILY MEDICINE | Facility: CLINIC | Age: 69
End: 2024-05-28
Payer: COMMERCIAL

## 2024-05-28 DIAGNOSIS — E03.9 HYPOTHYROIDISM, UNSPECIFIED TYPE: ICD-10-CM

## 2024-05-28 NOTE — TELEPHONE ENCOUNTER
General Call      Reason for Call:  Question if lab work is needed    What are your questions or concerns:  Pt was advised to get labwork done 1 month after seeing Leisa Garza and pt wants to make sure they are needed and the orders are placed before she schedules    Date of last appointment with provider: 4/30/2024    Could we send this information to you in [employer] or would you prefer to receive a phone call?:   Patient would prefer a phone call   Okay to leave a detailed message?: Yes at Cell number on file:    Telephone Information:   Mobile [phone]

## 2024-05-28 NOTE — TELEPHONE ENCOUNTER
As far as I can see we need to recheck thyroid, the rest would be too soon to recheck and see a difference. Labs are in if that is the case.     Leisa Garza, CNP

## 2024-06-24 ENCOUNTER — TRANSFERRED RECORDS (OUTPATIENT)
Dept: HEALTH INFORMATION MANAGEMENT | Facility: CLINIC | Age: 69
End: 2024-06-24
Payer: COMMERCIAL

## 2024-06-24 DIAGNOSIS — Z71.82 EXERCISE COUNSELING: ICD-10-CM

## 2024-06-26 ENCOUNTER — TELEPHONE (OUTPATIENT)
Dept: FAMILY MEDICINE | Facility: CLINIC | Age: 69
End: 2024-06-26
Payer: COMMERCIAL

## 2024-06-26 NOTE — TELEPHONE ENCOUNTER
Medication Question or Refill        What medication are you calling about (include dose and sig)?:     Pt called about picking up RX - she changed location and it was ordered for the correct place but just needs to be approved.  Its currently in Que (PENDING)     Preferred Pharmacy:     [facility] [address] [place] [facility] [address] [facility]  [address] [facility] [address]  Phone: [phone] Fax: [phone]  Phone: [phone] Fax: [phone]      Controlled Substance Agreement on file:   CSA -- Patient Level:    CSA: None found at the patient level.       Who prescribed the medication?: DR VALVERDE    Do you need a refill? Yes    When did you use the medication last? NA    Patient offered an appointment? No    Do you have any questions or concerns?  No      Could we send this information to you in [facility] or would you prefer to receive a phone call?:   Patient would prefer a phone call   Okay to leave a detailed message?: Yes at Cell number on file:    Telephone Information:   Mobile [phone]     JACKLYN PATEL

## 2024-06-27 RX ORDER — PHENTERMINE HYDROCHLORIDE 30 MG/1
CAPSULE ORAL
Qty: 30 CAPSULE | Refills: 1 | Status: SHIPPED | OUTPATIENT
Start: 2024-06-27

## 2024-06-27 NOTE — TELEPHONE ENCOUNTER
See 6/24 refill encounter.     Rx was sent today to preferred pharmacy.   Also sent [employer] message

## 2024-06-27 NOTE — TELEPHONE ENCOUNTER
Patient calls, states she is still waiting for MONTEZ Garza to sign prescription of phentramine. Order was pended on 6/24/24 patient is leaving town soon and needs refill done soon. Patient would like a call when done.

## 2024-06-27 NOTE — TELEPHONE ENCOUNTER
Placed call to patient to advise overdue to be seen for weight recheck/BP. Appt scheduled with PCP for 7/11/24.

## 2024-06-27 NOTE — TELEPHONE ENCOUNTER
Attempt #1  Called Phone # [phone]     Left a non detailed voicemail to please call back and ask for any available triage nurse, prescription has been called in  @ [phone].     Dana HERNANDEZ RN   [facility] Triage

## 2024-06-27 NOTE — TELEPHONE ENCOUNTER
BP Readings from Last 3 Encounters:   04/30/24 122/76   09/28/23 118/78   08/02/23 128/66     Pt overdue for in person visit.     Needs a 20 min visit with me to recheck weight and BP on this medication.   Needs to be seen monthly for this med.     Please assist pt in making appt(s) for the above.       Future Appointments 6/27/2024 - 12/24/2024      None

## 2024-07-11 ENCOUNTER — OFFICE VISIT (OUTPATIENT)
Dept: FAMILY MEDICINE | Facility: CLINIC | Age: 69
End: 2024-07-11
Payer: COMMERCIAL

## 2024-07-11 VITALS
OXYGEN SATURATION: 97 % | HEIGHT: 61 IN | RESPIRATION RATE: 16 BRPM | WEIGHT: 170 LBS | SYSTOLIC BLOOD PRESSURE: 112 MMHG | TEMPERATURE: 98.8 F | DIASTOLIC BLOOD PRESSURE: 72 MMHG | HEART RATE: 74 BPM | BODY MASS INDEX: 32.1 KG/M2

## 2024-07-11 DIAGNOSIS — M17.0 PRIMARY OSTEOARTHRITIS OF BOTH KNEES: ICD-10-CM

## 2024-07-11 DIAGNOSIS — E03.9 HYPOTHYROIDISM, UNSPECIFIED TYPE: ICD-10-CM

## 2024-07-11 DIAGNOSIS — F50.819 BINGE EATING DISORDER: ICD-10-CM

## 2024-07-11 DIAGNOSIS — E66.09 CLASS 1 OBESITY DUE TO EXCESS CALORIES WITH SERIOUS COMORBIDITY AND BODY MASS INDEX (BMI) OF 32.0 TO 32.9 IN ADULT: Primary | ICD-10-CM

## 2024-07-11 DIAGNOSIS — E66.811 CLASS 1 OBESITY DUE TO EXCESS CALORIES WITH SERIOUS COMORBIDITY AND BODY MASS INDEX (BMI) OF 32.0 TO 32.9 IN ADULT: Primary | ICD-10-CM

## 2024-07-11 DIAGNOSIS — R73.03 PREDIABETES: ICD-10-CM

## 2024-07-11 LAB
ALBUMIN SERPL BCG-MCNC: 4.2 G/DL (ref 3.5–5.2)
ALP SERPL-CCNC: 61 U/L (ref 40–150)
ALT SERPL W P-5'-P-CCNC: 29 U/L (ref 0–50)
ANION GAP SERPL CALCULATED.3IONS-SCNC: 7 MMOL/L (ref 7–15)
AST SERPL W P-5'-P-CCNC: 30 U/L (ref 0–45)
BILIRUB SERPL-MCNC: 0.4 MG/DL
BUN SERPL-MCNC: 16.4 MG/DL (ref 8–23)
CALCIUM SERPL-MCNC: 9.2 MG/DL (ref 8.8–10.2)
CHLORIDE SERPL-SCNC: 106 MMOL/L (ref 98–107)
CREAT SERPL-MCNC: 0.8 MG/DL (ref 0.51–0.95)
DEPRECATED HCO3 PLAS-SCNC: 28 MMOL/L (ref 22–29)
EGFRCR SERPLBLD CKD-EPI 2021: 80 ML/MIN/1.73M2
GLUCOSE SERPL-MCNC: 91 MG/DL (ref 70–99)
HBA1C MFR BLD: 5.4 % (ref 0–5.6)
POTASSIUM SERPL-SCNC: 4.6 MMOL/L (ref 3.4–5.3)
PROT SERPL-MCNC: 6.7 G/DL (ref 6.4–8.3)
SODIUM SERPL-SCNC: 141 MMOL/L (ref 135–145)
T3 SERPL-MCNC: 137 NG/DL (ref 85–202)
T4 FREE SERPL-MCNC: 1.36 NG/DL (ref 0.9–1.7)
TSH SERPL DL<=0.005 MIU/L-ACNC: 0.7 UIU/ML (ref 0.3–4.2)

## 2024-07-11 PROCEDURE — 84480 ASSAY TRIIODOTHYRONINE (T3): CPT | Performed by: FAMILY MEDICINE

## 2024-07-11 PROCEDURE — 36415 COLL VENOUS BLD VENIPUNCTURE: CPT | Performed by: FAMILY MEDICINE

## 2024-07-11 PROCEDURE — 84439 ASSAY OF FREE THYROXINE: CPT | Performed by: FAMILY MEDICINE

## 2024-07-11 PROCEDURE — 84443 ASSAY THYROID STIM HORMONE: CPT | Performed by: FAMILY MEDICINE

## 2024-07-11 PROCEDURE — 99214 OFFICE O/P EST MOD 30 MIN: CPT | Performed by: FAMILY MEDICINE

## 2024-07-11 PROCEDURE — 83036 HEMOGLOBIN GLYCOSYLATED A1C: CPT | Performed by: FAMILY MEDICINE

## 2024-07-11 PROCEDURE — 80053 COMPREHEN METABOLIC PANEL: CPT | Performed by: FAMILY MEDICINE

## 2024-07-11 RX ORDER — PHENTERMINE HYDROCHLORIDE 37.5 MG/1
37.5 CAPSULE ORAL EVERY MORNING
Qty: 30 CAPSULE | Refills: 2 | Status: SHIPPED | OUTPATIENT
Start: 2024-07-11 | End: 2024-09-12

## 2024-07-11 ASSESSMENT — ENCOUNTER SYMPTOMS: HYPERTENSION: 1
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
## 2024-07-11 NOTE — PATIENT INSTRUCTIONS
" [facility]  [address] [place] [address]  Office: [phone]   Fax:    [phone]     The [employer].  Wrote a book called \"The 5 Second Rule\" .     Discussed need for resistance training to help keep bones strong and decrease age -related muscle loss, decreasing insulin resistance and increasing basal metabolic rate to help burn more calories at rest and with exertion.     Look at YouTube for free exercises for basic weight training for biceps, triceps, deltoids, low back, hips, hamstrings, quadriceps, and calf muscles to help strengthen your bones as well as the below exercises.       Clinical References    Resistance Training With Free Weights: Exercises  Introduction  Here are some examples of exercises for resistance training. Start each exercise slowly. Ease off the exercise if you start to have pain.  Your doctor or physical therapist will tell you when you can start these exercises and which ones will work best for you.  How to do the exercises  Chest fly    Lie on a bench or exercise ball, and hold the weights straight up over your chest. Do not lock your elbows. You can keep them slightly bent if that is comfortable for you.  Slowly lower your arms, keeping them extended, until the weights are level with your chest, or slightly lower.  Slowly raise your arms until you are in the starting position.  Repeat 8 to 12 times.  Rest for a minute, and repeat the exercise.  Arm raise to the side (elbows bent)    Stand with your feet shoulder-width apart and your knees slightly bent. Or sit up straight in a chair.  Hold a 1- to 2-pound weight in each hand. The weight may be a dumbbell, a can of food, or a filled water bottle.  Bend your elbows 90 degrees while keeping them at your sides. With your palms facing in, hold the weights straight in front of you.  Slowly lift the weights and your elbows out to the sides to shoulder level, keeping your elbows bent. Keep " your shoulders down and relaxed as you lift. If you find that you are shrugging your shoulders up toward your ears, your weights may be too heavy. Try using lighter weights (or even no weights).  Slowly lower the weights and your elbows until your elbows are back at your sides.  Repeat 8 to 12 times.  Biceps curls    Sit leaning forward with your legs slightly spread and your left hand on your left thigh.  Hold the weight in your right hand, and place your right elbow on your right thigh.  Slowly curl the weight up and toward your chest.  Slowly lower the weight to the original position.  Repeat 8 to 12 times.  Rest for a minute, and repeat the exercise.  Do the same exercise with your other arm.  Follow-up care is a key part of your treatment and safety. Be sure to make and go to all appointments, and call your doctor if you are having problems. It's also a good idea to know your test results and keep a list of the medicines you take.  Current as of: June 6, 2023               Content Version: 13.8    4165-4314 [employer].   Care instructions adapted under license by your healthcare professional. If you have questions about a medical condition or this instruction, always ask your healthcare professional. [employer] disclaims any warranty or liability for your use of this information.        Resistance Training With Surgical Tubing: Exercises  Introduction  Here are some examples of exercises for resistance training. Start each exercise slowly. Ease off the exercise if you start to have pain.  Your doctor or physical therapist will tell you when you can start these exercises and which ones will work best for you.  How to do the exercises  Side pull    Sit or stand up straight. Grasp an exercise band with your hands about shoulder-width apart.  Raise both arms overhead, palms of your hands facing forward.  Slowly pull one arm down and to the side, bending your elbow and stretching the band  until your elbow is at shoulder height. Hold for 1 to 2 seconds.  Slowly return to the starting position with your arms straight up.  Repeat with the other arm.  Repeat 8 to 12 times with each arm.  Overhead pull    Sit or stand up straight. Grasp an exercise band with your hands about shoulder-width apart.  Raise both arms overhead, palms of your hands facing forward.  Slowly pull your hands apart, stretching the band. Hold for 1 to 2 seconds.  Slowly return to the starting position with your arms straight up.  Repeat 8 to 12 times.  Up-down pull    Sit or stand up straight. Grasp an exercise band with your hands about shoulder width apart.  Raise both arms overhead.  Bend your elbows until they are at shoulder height, with the stretched band either behind or in front of your head. Hold for 1 to 2 seconds.  Slowly return to the starting position with your arms straight up.  Repeat 8 to 12 times.  Chest-level pull    Sit or stand up straight. Grasp an exercise band with your hands about shoulder-width apart.  Raise your arms to chest level and bend your elbows.  Slowly pull your hands apart and your shoulder blades together, stretching the band. Hold for 1 to 2 seconds. Try to keep your hands up at your chest level, and do not pull your shoulders up toward your ears.  Slowly return to your starting position.  Repeat 8 to 12 times.  Hip-level pull    Stand or sit up straight in a chair without arms. Grasp an exercise band with your hands about shoulder-width apart.  Hold your hands at the level of your hips, or near your lap if you are sitting down.  Slowly pull your hands apart, stretching the band. Hold for 1 or 2 seconds.  Slowly return to your starting position.  Repeat 8 to 12 times.  Follow-up care is a key part of your treatment and safety. Be sure to make and go to all appointments, and call your doctor if you are having problems. It's also a good idea to know your test results and keep a list of the  medicines you take.  Current as of: June 6, 2023               Content Version: 13.8 2006-2023 [employer].   Care instructions adapted under license by your healthcare professional. If you have questions about a medical condition or this instruction, always ask your healthcare professional. [employer] disclaims any warranty or liability for your use of this information.         Fitness: Increasing Your Core Stability (02:03)  Your health professional recommends that you watch this short online health video.  Learn how to keep your core strong and prevent injury with two simple exercises.  Purpose:  Shows belly tightening and bridging to improve core stability. Emphasizes how a strong core helps prevent injury.  Goal:  The user will learn belly tightening and bridging to improve core stability.      How to watch the video     Scan the QR code   OR Visit the website    [email or web address]   Current as of: July 18, 2023               Content Version: 13.8 2006-2023 [employer].   Care instructions adapted under license by your healthcare professional. If you have questions about a medical condition or this instruction, always ask your healthcare professional. [employer] disclaims any warranty or liability for your use of this information.          Muscle Conditioning: Exercises  Introduction  Here are some examples of exercises for muscle conditioning. Start each exercise slowly. Ease off the exercise if you start to have pain.  Your doctor or physical therapist will tell you when you can start these exercises and which ones will work best for you.  How to do the exercises  Wall push-ups    When you can do this exercise against a wall comfortably (without your muscles feeling tired), you can try it against a counter. Start with 5 repetitions again and work up to 8 to 12. You can then slowly progress to the end of a couch or a sturdy  chair, and finally to the floor.  Stand facing a wall, about 12 to 18 inches away.  Place your hands on the wall at shoulder height.  Slowly bend your elbows and bring your face toward the wall, moving your hips and shoulders forward together.  Push slowly back to the starting position.  Start with 5 repetitions and work up to 8 to 12.  Rest for a minute, and repeat the exercise.    Side-lying leg lift    If this exercise becomes easy, you can add a light weight around your ankle or tie an elastic resistance band to both ankles.  Lie on your side, with your legs extended. Keep your hips straight up and down during this exercise. Do not let your top hip rock toward the back. Support your head with your hand, and place the other hand on the floor near your waist.  Slowly raise your upper leg until it is about in line with your shoulder. Keep your toes pointed forward.  Slowly lower your leg to the starting position.  Repeat 8 to 12 times.  Rest for a minute, and repeat the exercise.  Turn to your other side and do the same exercise with your other leg.  Shallow standing knee bends    Stand with your hands lightly resting on a counter or chair in front of you with your feet shoulder-width apart.  Slowly bend your knees so that you squat down just like you were going to sit in a chair. Make sure your knees do not go in front of your toes.  Lower yourself about 6 inches. Your heels should remain on the floor at all times.  Rise slowly to a standing position.  Repeat 8 to 12 times.  Rest for a minute, and repeat the exercise.  Follow-up care is a key part of your treatment and safety. Be sure to make and go to all appointments, and call your doctor if you are having problems. It's also a good idea to know your test results and keep a list of the medicines you take.  Current as of: June 6, 2023               Content Version: 13.8    3144-8749 [employer].   Care instructions adapted under license by your  "healthcare professional. If you have questions about a medical condition or this instruction, always ask your healthcare professional. [employer]. disclaims any warranty or liability for your use of this information.         How to Do the Wall Sit Exercise (00:46)  Your health professional recommends that you watch this short online health video.  Learn how to do the wall sit exercise to increase strength and stability in your core and your legs.  Purpose:  Demonstrates the steps required to perform the wall sit exercise to increase strength and stability in the core and legs.  Goal:  The user will learn how to do the wall sit exercise to increase strength and stability in the core and legs.      How to watch the video     Scan the QR code   OR Visit the website    [email or web address]   Current as of: July 18, 2023               Content Version: 13.8    3065-8814 [employer]   Care instructions adapted under license by your healthcare professional. If you have questions about a medical condition or this instruction, always ask your healthcare professional. [employer]. disclaims any warranty or liability for your use of this information.    Unfortunately there is no \"magic wand\" or \" magic pill\" for weight loss.  It requires combination of good nutrition, portion size, slight calorie deficit between consumption and exercise, and perseverance.    Keeping track of calories and intake can really help.  I recommend the myfitnesspal or myplate good(s) to help with this.     Dr. Story's recommended diet to rev-up metabolism for weight loss:   eat every 2-3 hours.    Try to keep your gram to gram ratio of carbs:protein to 12-15grams of each /small meal 5-6x/day.     Lean protein = 2 egg whites or 1 whole egg, or turkey, chicken, fish.  Low-glycemic fruits = strawberries, blueberries, raspberries, blackberries, nectarines, grapefruit, oranges,  apples.  "     2 oz. Lean protein + 1/2 cup  low-glycemic fruit --- breakfast and midmorning snack .     2  oz. Lean protein + 1/4 cup whole grain rice or sweet potato + 1 cup green veggie - lunch, dinner ( increase protein to 3 oz) , and afternoon snack.      Evening snack : 1/2 cup of low glycemic fruit or an apple.        Try to keep carb: protein gram ratio about 1:1 with 12-15g of each per meal with small amount of monounsaturated fat -like olive oil.     If you can't kill it and grill it, or pick it off a plant and eat it - DON'T EAT IT!     For occasional pasta - try Barilla Plus - has protein in it. - keep to 1/2 cup instead of your 1/4 cup of rice or potato.     Take your fish oil capsules 2,000mg daily - with your probiotic, if you take one. Take a multivitamin daily.     Try to keep your gram to gram ratio of carbs:protein to 12-15grams of each /small meal 5-6x/day.     If 5-6 small meals/day - too labor intensive, then keep to 3 meals plus 1 snack with 3 oz lean protein per meal with 2- 3 oz protein in your snack.     AVOID DAIRY AND GRAINS, if you can.   Keep your sodium to 600-1000g per day or less.     Substitutions:   In place of 1 meal/snack a day - you can have- 10 almonds, 4 walnut halves, 5 cashews, 6 pecan halves, or 1/8 cup of sunflower seeds or pistachios  (shelled).      In place of 1 meal/snack a day (1-2x/week) - 1/4 of an Avocado with lettuce wrap -like henok , etc.     In place of 1 meal/snack per day - can have a protein bar no more than 130 calories and 15grams each  of sugar /total carbs and protein.   Built bars from builtbar.com or Fit Crunch Bars from Ross Ewing ([employer]) or [employer] Protein bars - look at the labels.     Drink a lot of water - approx 60 oz/day.      Add in 30-45 minutes of brisk walking/day.     Thank you so much for choosing [facility].  Please contact us with any questions that you may have.   We appreciate the opportunity to serve you now and look  forward to supporting your healthcare needs for a long time to come!    Most Sincerely,     Phuong Story MD

## 2024-07-23 NOTE — RESULT ENCOUNTER NOTE
Dear Edwina Parsons,       All your recent labs that I ordered are normal, improved, or pretty stable from previous.     Please, continue your current medications and/or supplements and follow up as we discussed at your last visit.     For additional lab test information, [email or web address] is an excellent reference.    Thank you so much for choosing [facility].  Please contact us with any questions that you may have.   We appreciate the opportunity to serve you now and look forward to supporting your healthcare needs for a long time to come!    Most Sincerely,     Phuong Story MD

## 2024-08-07 ENCOUNTER — NURSE TRIAGE (OUTPATIENT)
Dept: FAMILY MEDICINE | Facility: CLINIC | Age: 69
End: 2024-08-07
Payer: COMMERCIAL
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
## 2024-08-12 ENCOUNTER — TRANSFERRED RECORDS (OUTPATIENT)
Dept: HEALTH INFORMATION MANAGEMENT | Facility: CLINIC | Age: 69
End: 2024-08-12
Payer: COMMERCIAL

## 2024-09-11 ENCOUNTER — TELEPHONE (OUTPATIENT)
Dept: FAMILY MEDICINE | Facility: CLINIC | Age: 69
End: 2024-09-11
Payer: COMMERCIAL

## 2024-09-11 DIAGNOSIS — E66.09 CLASS 1 OBESITY DUE TO EXCESS CALORIES WITH SERIOUS COMORBIDITY AND BODY MASS INDEX (BMI) OF 32.0 TO 32.9 IN ADULT: ICD-10-CM

## 2024-09-11 DIAGNOSIS — R73.03 PREDIABETES: ICD-10-CM

## 2024-09-11 DIAGNOSIS — E66.811 CLASS 1 OBESITY DUE TO EXCESS CALORIES WITH SERIOUS COMORBIDITY AND BODY MASS INDEX (BMI) OF 32.0 TO 32.9 IN ADULT: ICD-10-CM

## 2024-09-11 NOTE — TELEPHONE ENCOUNTER
Medication Question or Refill    phentermine 30 MG capsule     What medication are you calling about (include dose and sig)?: phentermine 30 MG capsule  - pt is asking for a refill - thought there was one ordered, however pharmacy stated needed to get a hold of provider    Going out of town Friday - ASAP    Preferred Pharmacy:     [facility] [address] [place] [facility] [place], [place] - [address] [facility]  [address] [facility] [address]  Phone: [phone] Fax: [phone]      Controlled Substance Agreement on file:   CSA -- Patient Level:    CSA: None found at the patient level.       Who prescribed the medication?: Vanderscoff    Do you need a refill? Yes    When did you use the medication last? na    Patient offered an appointment? No    Do you have any questions or concerns?  No      Could we send this information to you in [facility] or would you prefer to receive a phone call?:   aram PATEL

## 2024-09-12 NOTE — TELEPHONE ENCOUNTER
Pt calling back for update regarding refill request of phentermine 37.5mg. Pharmacy desired attached. Please advise. Pt leaving out of town tomorrow.

## 2024-09-19 RX ORDER — PHENTERMINE HYDROCHLORIDE 37.5 MG/1
37.5 CAPSULE ORAL EVERY MORNING
Qty: 30 CAPSULE | Refills: 2 | Status: SHIPPED | OUTPATIENT
Start: 2024-09-19

## 2024-11-04 ENCOUNTER — TELEPHONE (OUTPATIENT)
Dept: FAMILY MEDICINE | Facility: CLINIC | Age: 69
End: 2024-11-04
Payer: COMMERCIAL

## 2024-11-04 NOTE — TELEPHONE ENCOUNTER
General Call    Contacts       Contact Date/Time Type Contact Phone/Fax    11/04/2024 12:28 PM CST Phone (Incoming) Edwina Zhou MARY (Self) [phone] ()          Reason for Call:  Patient was out with family this past weekend and found deer ticks on her. She's not experiencing any symptoms but she has some questions about it.     What are your questions or concerns:  She's concerned that there's a possibility she might get lyme's disease before her surgery on 12/03/2024    Date of last appointment with provider: 07/11/2024    Could we send this information to you in [employer] or would you prefer to receive a phone call?:   Patient would prefer a phone call   Okay to leave a detailed message?: Yes at Home number on file [phone] (home)

## 2024-11-04 NOTE — TELEPHONE ENCOUNTER
Called #   Telephone Information:   Mobile [phone]     Pt stated that she was out with family and they saw saw deer ticks on themselves but she did not see any ticks on her but she is very worried that she will get lyme's before her knee replacement in December 3rd     She is prone to lymes and I worried that she may get it before surgery and pt is looking for treatment for this as she is worried that it will affect her surgery  Denies: rash, bulls eye, fevers, chills     Please advised if antibiotic is okay to be sent     Pharmacy: [facility]  [place]        Please review and advise      Thank you     Cortney Call RN, BSN  [facility]

## 2024-11-05 ENCOUNTER — OFFICE VISIT (OUTPATIENT)
Dept: FAMILY MEDICINE | Facility: CLINIC | Age: 69
End: 2024-11-05
Payer: COMMERCIAL

## 2024-11-05 VITALS
WEIGHT: 172 LBS | BODY MASS INDEX: 32.5 KG/M2 | TEMPERATURE: 98 F | DIASTOLIC BLOOD PRESSURE: 76 MMHG | RESPIRATION RATE: 18 BRPM | SYSTOLIC BLOOD PRESSURE: 120 MMHG | OXYGEN SATURATION: 98 % | HEART RATE: 88 BPM

## 2024-11-05 DIAGNOSIS — S00.06XA TICK BITE OF SCALP, INITIAL ENCOUNTER: Primary | ICD-10-CM
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
## 2024-11-05 PROCEDURE — 99214 OFFICE O/P EST MOD 30 MIN: CPT | Performed by: NURSE PRACTITIONER

## 2024-11-05 RX ORDER — DOXYCYCLINE 100 MG/1
100 CAPSULE ORAL 2 TIMES DAILY
Qty: 28 CAPSULE | Refills: 0 | Status: SHIPPED | OUTPATIENT
Start: 2024-11-05 | End: 2024-11-19

## 2024-11-05 NOTE — TELEPHONE ENCOUNTER
Called and informed patient of providers note.     Patient states she is very nervous that she could develop lyme's and would have to cancel her knee surgery.   The patient wants writer to let her provider know that she is concerned,   reassured patient that information was in the first note that provider reviewed.     Patient denied any symptoms and having had a tick on her this past weekend   She states everyone else had a tick on them.   Patient states she has checked her skin    Patient will check her body again, advised someone to check in areas she can view well such has her hair and back.   Monitor for symptoms and call back if symptoms develop.     Patient wanted writer to send this back to the provider

## 2024-11-05 NOTE — TELEPHONE ENCOUNTER
We generally do not recommend treatment for lyme's if no attached tick or no puncture to suggest a tick bite, unless pt is having symptoms.  It pt starts having symptoms of lyme - new achiness of joints, new headache, new rash. New brain fog, etc.  Please call us right away.    Please inform patient.

## 2024-11-05 NOTE — PROGRESS NOTES
Assessment & Plan     Tick bite of scalp, initial encounter  Treat as below. Follow up if any concerning symptoms.   - doxycycline monohydrate (MONODOX) 100 MG capsule  Dispense: 28 capsule; Refill: 0              See Patient Instructions    Subjective   Edwina Parsons is a 69 year old, presenting for the following health issues:  Insect Bites        11/5/2024     2:36 PM   Additional Questions   Roomed by Paulette TREJO   Accompanied by self     History of Present Illness       Reason for visit:  Deer tick bite   She is taking medications regularly.     Patient found engorged tick on her scalp. Concern for lyme exposure. Has had before and concern for upcoming knee surgery next month.     Feeling ok at this time but fibromyalgia so chronically has joint aches and fatigue.          Constitutional, HEENT, cardiovascular, pulmonary, GI, , musculoskeletal, neuro, skin, endocrine and psych systems are negative, except as otherwise noted.        Objective    /76   Pulse 88   Temp 98  F (36.7  C) (Tympanic)   Resp 18   Wt 78 kg (172 lb)   LMP 06/03/2010 (Approximate)   SpO2 98%   BMI 32.50 kg/m    Body mass index is 32.5 kg/m .  Physical Exam   GENERAL: alert and no distress  NECK: no adenopathy, no asymmetry, masses, or scars  RESP: lungs clear to auscultation - no rales, rhonchi or wheezes  CV: regular rate and rhythm, normal S1 S2, no S3 or S4, no murmur, click or rub, no peripheral edema  ABDOMEN: soft, nontender, no hepatosplenomegaly, no masses and bowel sounds normal  MS: no gross musculoskeletal defects noted, no edema            Signed Electronically by: Leisa Garza, CNP

## 2024-11-19 ENCOUNTER — OFFICE VISIT (OUTPATIENT)
Dept: FAMILY MEDICINE | Facility: CLINIC | Age: 69
End: 2024-11-19
Payer: COMMERCIAL

## 2024-11-19 VITALS
HEART RATE: 83 BPM | WEIGHT: 174 LBS | BODY MASS INDEX: 32.85 KG/M2 | RESPIRATION RATE: 14 BRPM | DIASTOLIC BLOOD PRESSURE: 78 MMHG | SYSTOLIC BLOOD PRESSURE: 132 MMHG | OXYGEN SATURATION: 97 % | HEIGHT: 61 IN | TEMPERATURE: 97.6 F

## 2024-11-19 DIAGNOSIS — E03.9 HYPOTHYROIDISM, UNSPECIFIED TYPE: ICD-10-CM

## 2024-11-19 DIAGNOSIS — R73.01 ELEVATED FASTING GLUCOSE: ICD-10-CM

## 2024-11-19 DIAGNOSIS — Z86.39 HISTORY OF OBESITY: ICD-10-CM

## 2024-11-19 DIAGNOSIS — M17.12 PRIMARY OSTEOARTHRITIS OF LEFT KNEE: ICD-10-CM

## 2024-11-19 DIAGNOSIS — R06.83 SNORING: ICD-10-CM

## 2024-11-19 DIAGNOSIS — Z12.31 VISIT FOR SCREENING MAMMOGRAM: ICD-10-CM

## 2024-11-19 DIAGNOSIS — Z01.818 PREOP GENERAL PHYSICAL EXAM: Primary | ICD-10-CM

## 2024-11-19 DIAGNOSIS — E78.5 HYPERLIPIDEMIA WITH TARGET LDL LESS THAN 130: ICD-10-CM

## 2024-11-19 DIAGNOSIS — Z91.89 AT RISK FOR SLEEP APNEA: ICD-10-CM

## 2024-11-19 DIAGNOSIS — E66.09 CLASS 1 OBESITY DUE TO EXCESS CALORIES WITH SERIOUS COMORBIDITY AND BODY MASS INDEX (BMI) OF 32.0 TO 32.9 IN ADULT: ICD-10-CM

## 2024-11-19 DIAGNOSIS — R73.03 PREDIABETES: ICD-10-CM

## 2024-11-19 DIAGNOSIS — F50.810 MILD BINGE-EATING DISORDER: ICD-10-CM

## 2024-11-19 DIAGNOSIS — E66.811 CLASS 1 OBESITY DUE TO EXCESS CALORIES WITH SERIOUS COMORBIDITY AND BODY MASS INDEX (BMI) OF 32.0 TO 32.9 IN ADULT: ICD-10-CM

## 2024-11-19 LAB
ALBUMIN SERPL BCG-MCNC: 4.1 G/DL (ref 3.5–5.2)
ALP SERPL-CCNC: 65 U/L (ref 40–150)
ALT SERPL W P-5'-P-CCNC: 22 U/L (ref 0–50)
ANION GAP SERPL CALCULATED.3IONS-SCNC: 8 MMOL/L (ref 7–15)
AST SERPL W P-5'-P-CCNC: 30 U/L (ref 0–45)
BILIRUB SERPL-MCNC: 0.3 MG/DL
BUN SERPL-MCNC: 14.5 MG/DL (ref 8–23)
CALCIUM SERPL-MCNC: 9.4 MG/DL (ref 8.8–10.4)
CHLORIDE SERPL-SCNC: 104 MMOL/L (ref 98–107)
CREAT SERPL-MCNC: 0.76 MG/DL (ref 0.51–0.95)
EGFRCR SERPLBLD CKD-EPI 2021: 85 ML/MIN/1.73M2
ERYTHROCYTE [DISTWIDTH] IN BLOOD BY AUTOMATED COUNT: 12.6 % (ref 10–15)
EST. AVERAGE GLUCOSE BLD GHB EST-MCNC: 120 MG/DL
GLUCOSE SERPL-MCNC: 92 MG/DL (ref 70–99)
HBA1C MFR BLD: 5.8 % (ref 0–5.6)
HCO3 SERPL-SCNC: 28 MMOL/L (ref 22–29)
HCT VFR BLD AUTO: 41.9 % (ref 35–47)
HGB BLD-MCNC: 13.7 G/DL (ref 11.7–15.7)
MCH RBC QN AUTO: 30.5 PG (ref 26.5–33)
MCHC RBC AUTO-ENTMCNC: 32.7 G/DL (ref 31.5–36.5)
MCV RBC AUTO: 93 FL (ref 78–100)
PLATELET # BLD AUTO: 275 10E3/UL (ref 150–450)
POTASSIUM SERPL-SCNC: 4.5 MMOL/L (ref 3.4–5.3)
PROT SERPL-MCNC: 7 G/DL (ref 6.4–8.3)
RBC # BLD AUTO: 4.49 10E6/UL (ref 3.8–5.2)
SODIUM SERPL-SCNC: 140 MMOL/L (ref 135–145)
TSH SERPL DL<=0.005 MIU/L-ACNC: 2.01 UIU/ML (ref 0.3–4.2)
WBC # BLD AUTO: 7.6 10E3/UL (ref 4–11)

## 2024-11-19 PROCEDURE — 83036 HEMOGLOBIN GLYCOSYLATED A1C: CPT | Performed by: FAMILY MEDICINE

## 2024-11-19 PROCEDURE — 99215 OFFICE O/P EST HI 40 MIN: CPT | Performed by: FAMILY MEDICINE

## 2024-11-19 PROCEDURE — G0008 ADMIN INFLUENZA VIRUS VAC: HCPCS | Performed by: FAMILY MEDICINE

## 2024-11-19 PROCEDURE — 93000 ELECTROCARDIOGRAM COMPLETE: CPT | Performed by: FAMILY MEDICINE

## 2024-11-19 PROCEDURE — 36415 COLL VENOUS BLD VENIPUNCTURE: CPT | Performed by: FAMILY MEDICINE

## 2024-11-19 PROCEDURE — 80053 COMPREHEN METABOLIC PANEL: CPT | Performed by: FAMILY MEDICINE

## 2024-11-19 PROCEDURE — 85027 COMPLETE CBC AUTOMATED: CPT | Performed by: FAMILY MEDICINE

## 2024-11-19 PROCEDURE — 84443 ASSAY THYROID STIM HORMONE: CPT | Performed by: FAMILY MEDICINE

## 2024-11-19 PROCEDURE — 90662 IIV NO PRSV INCREASED AG IM: CPT | Performed by: FAMILY MEDICINE

## 2024-11-19 NOTE — PROGRESS NOTES
Preoperative Evaluation  [facility]  [address] [facility] [address]  Phone: [phone]  Primary Provider: Phuong Story MD  Pre-op Performing Provider: Phuong Story MD  Nov 19, 2024     Assessment & Plan     The proposed surgical procedure is considered INTERMEDIATE risk.      ICD-10-CM    1. Preop general physical exam  Z01.818 EKG 12-lead complete w/read - Clinics     CBC with platelets     TSH with free T4 reflex     CBC with platelets     CANCELED: TSH with free T4 reflex      2. Primary osteoarthritis of left knee  M17.12       3. Elevated fasting glucose  R73.01 Hemoglobin A1c     Hemoglobin A1c      4. Hypothyroidism, unspecified type  E03.9 TSH with free T4 reflex      5. Hyperlipidemia with target LDL less than 130  E78.5 Comprehensive metabolic panel (BMP + Alb, Alk Phos, ALT, AST, Total. Bili, TP)     Comprehensive metabolic panel (BMP + Alb, Alk Phos, ALT, AST, Total. Bili, TP)      6. History of obesity - pt's PCOS, GERD, fibromyalgia = much better since weight loss   Z86.39       7. Snoring  R06.83 Adult Sleep Eval & Management  Referral     Adult ENT  Referral      8. At risk for sleep apnea  Z91.89 Adult Sleep Eval & Management  Referral     Adult ENT  Referral      9. Mild binge-eating disorder  F50.810       10. Class 1 obesity due to excess calories with serious comorbidity and body mass index (BMI) of 32.0 to 32.9 in adult  E66.811     E66.09     Z68.32       11. Visit for screening mammogram  Z12.31 MA Screen Bilateral w/Demario      12. Prediabetes- new diagnosis 4/2024  R73.03 Adult Diabetes Education  Referral        Possible Sleep Apnea: [id number]}      11/19/2024    10:59 AM   STOP-Bang Total Score   Total Score 4   Risk Stratification 3 - 4: Moderate Risk for SHANE      Discussed above with pt today . She now is accepting of  a sleep medicine referral at this time.       - No  identified additional risk factors other than previously addressed    Antiplatelet or Anticoagulation Medication Instructions:    - Patient is on no antiplatelet or anticoagulation medications.    Additional Medication Instructions:   Take all scheduled medications on the day of surgery - levothyroxine with a sip of water.    - Statins: Continue taking on the day of surgery.    - Herbal medications and vitamins: DO NOT TAKE 14 days prior to surgery.   - ibuprofen (Advil, Motrin): DO NOT TAKE 10 day before surgery.    - naproxen (Aleve, Naprosyn): DO NOT TAKE 10 days before surgery.     Take tylenol only for pain until after surgery.     Recommendation:   Approval given to proceed with proposed procedure, without further diagnostic evaluation.    Subjective   Edwina Parsons is a 69 year old, presenting for the following:  Pre-Op Exam  With  the following other medical problems:      1. Preop general physical exam    2. Primary osteoarthritis of left knee    3. Elevated fasting glucose    4. Hypothyroidism, unspecified type    5. Hyperlipidemia with target LDL less than 130    6. History of obesity - pt's PCOS, GERD, fibromyalgia = much better since weight loss     7. Snoring    8. At risk for sleep apnea    9. Mild binge-eating disorder    10. Class 1 obesity due to excess calories with serious comorbidity and body mass index (BMI) of 32.0 to 32.9 in adult    11. Visit for screening mammogram    12. Prediabetes- new diagnosis 4/2024 11/19/2024     9:44 AM   Additional Questions   Roomed by Riki TREJO   Accompanied by Self       HPI related to upcoming procedure: 10 year hx of left knee pain secondary to osteoarthritis - pt has failed multiple steroid injections and physical therapy.      What procedure is being done? Knee Replacement - LEFT   Facility or Hospital where procedure/surgery will be performed: [facility]   Who is doing the procedure / surgery? Dr. Nathaniel Lawrence   Date  of surgery / procedure: Dec. 3, 2024   Time of surgery / procedure: 7:00am   Where do you plan to recover after surgery? at home with family     Fax pre-op to - [phone]        11/18/2024   Pre-Op Questionnaire   Have you ever had a heart attack or stroke? No    Have you ever had surgery on your heart or blood vessels, such as a stent placement, a coronary artery bypass, or surgery on an artery in your head, neck, heart, or legs? No    Do you have chest pain with activity? No    Do you have a history of heart failure? No    Do you currently have a cold, bronchitis or symptoms of other infection? No    Do you have a cough, shortness of breath, or wheezing? No    Do you or anyone in your family have previous history of blood clots? No    Do you or does anyone in your family have a serious bleeding problem such as prolonged bleeding following surgeries or cuts? No    Have you ever had problems with anemia or been told to take iron pills? (!) YES - in the past - pt can't recall how long ago or due to what     Have you had any abnormal blood loss such as black, tarry or bloody stools, or abnormal vaginal bleeding? No    Have you ever had a blood transfusion? No    Are you willing to have a blood transfusion if it is medically needed before, during, or after your surgery? Yes    Have you or any of your relatives ever had problems with anesthesia? No    Do you have sleep apnea, excessive snoring or daytime drowsiness? (!) UNKNOWN - see above.    Do you have any artifical heart valves or other implanted medical devices like a pacemaker, defibrillator, or continuous glucose monitor? No    Do you have artificial joints? No    Are you allergic to latex? No        Patient-reported     Health Care Directive  Patient has a Health Care Directive on file      Preoperative Review of :    reviewed - controlled substances reflected in medication list.      Status of Chronic Conditions:  See problem list for active medical  problems.  Problems all longstanding and stable, except as noted/documented.  See ROS for pertinent symptoms related to these conditions.    Patient Active Problem List    Diagnosis Date Noted    Hyperlipidemia with target LDL less than 130 09/23/2015     Priority: High     Diagnosis updated by automated process. Provider to review and confirm.      Pill dysphagia 08/28/2014     Priority: High    Snoring 11/19/2024     Priority: Medium    At risk for sleep apnea 11/19/2024     Priority: Medium    Prediabetes- new diagnosis 4/2024 11/19/2024     Priority: Medium    Encounter for completion of form with patient- for FM/a test for fibromyalgia through [facility] [place] by [facility] 09/28/2023     Priority: Medium    Fall on or from stairs or steps, initial encounter 2 weeks prior to 8/2/2023 =  myalgias and some mild joint pains - no bony pains 08/20/2023     Priority: Medium    Fibromyalgia 06/28/2023     Priority: Medium    Class 1 obesity due to excess calories with serious comorbidity and body mass index (BMI) of 32.0 to 32.9 in adult 06/28/2023     Priority: Medium    Exercise counseling 06/28/2023     Priority: Medium    Chronic pain of both knees 03/28/2023     Priority: Medium    Metatarsalgia of both feet 02/13/2023     Priority: Medium    Sun-damaged skin 02/13/2023     Priority: Medium    Bilateral foot pain 02/13/2023     Priority: Medium    Multiple pigmented nevi 02/13/2023     Priority: Medium    Primary osteoarthritis of both knees 02/13/2023     Priority: Medium    Chronic pain of both shoulders 02/13/2023     Priority: Medium    Screening, anemia, deficiency, iron 02/13/2023     Priority: Medium    Hypothyroidism, unspecified type- needs lab follow up and ? refills  01/12/2022     Priority: Medium    Subclinical hypothyroidism 01/12/2022     Priority: Medium    Traumatic rupture of supraspinatus tendon of left shoulder, subsequent encounter  08/14/2019     Priority: Medium    Fullness of supraclavicular fossa- soft, indistinct - left greater than right  08/14/2019     Priority: Medium    Traumatic tear of left rotator cuff, unspecified tear extent, subsequent encounter- seeing [facility] - after fall on ice 1/2019 06/26/2019     Priority: Medium    Cystocele with uterine descensus 01/04/2018     Priority: Medium    History of obesity - pt's PCOS, GERD, fibromyalgia = much better since weight loss  09/18/2017     Priority: Medium    Right rotator cuff tendonitis 09/18/2017     Priority: Medium    Eosinophilic esophagitis- per EGD biopsies - will treat with fluticasone oral inhaler 09/25/2014     Priority: Medium    Fatigue 07/30/2014     Priority: Medium    Gluten intolerance- went gluten-free 3/2014  07/30/2014     Priority: Medium    External hemorrhoids 03/27/2013     Priority: Medium    Asymptomatic menopausal state      Priority: Medium    Mixed incontinence urge and stress  (female) 11/11/2011     Priority: Medium    Vitamin D deficiency- mild  11/15/2010     Priority: Medium    Lyme disease      Priority: Medium    Shingles      Priority: Medium    Gastroesophageal reflux disease with esophagitis without hemorrhage 02/26/2010     Priority: Medium    h/o cervical dysplasia - s/p cryotherapy in 90's - normal paps since - YISEL 1 - ok for q3 yr paps starting 2012 02/01/2010     Priority: Medium    Knee sprain 12/04/2009     Priority: Medium    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 12/01/2009     Priority: Medium    Eating Disorder- overeating/binging  12/01/2009     Priority: Medium    Cervicalgia 10/20/2009     Priority: Medium    Lumbago 10/20/2009     Priority: Medium    Nonallopathic lesion of cervical region 10/20/2009     Priority: Medium     Problem list name updated by automated process. Provider to review      Nonallopathic lesion of thoracic region 10/20/2009     Priority: Medium     Problem list name updated by automated process.  "Provider to review      Dysphagia-had esophagram 12/07 - tertiary contractions of esoph with some retention, EGD showed gastritis, duodenitis and esophagitis ,pt didn't start a PPI   09/10/2008     Priority: Medium     had esophagram 12/07 - tertiary contractions of esoph with some retention, EGD showed gastritis, duodenitis and esophagitis ,pt didn't start a PPI        Gastritis, duodenitis, esophagitis  09/10/2008     Priority: Medium    Allergic rhinitis 08/08/2006     Priority: Medium     Problem list name updated by automated process. Provider to review      Allergic state 06/24/2005     Priority: Medium     Problem list name updated by automated process. Provider to review      Chronic fatigue 02/04/2005     Priority: Medium     fibromyalgia - doing an otc human growth  hormone   Problem list name updated by automated process. Provider to review        Past Medical History:   Diagnosis Date    Allergic rhinitis, cause unspecified     Arthritis     Dysphagia     had esophagram 12/07 - tertiary contractions of esoph with some retention, EGD showed gastritis, duodenitis and esophagitis ,pt didn't start a PPI      Fatigue     Fatigue     Gastroesophageal reflux disease     H/O YISEL I     s/p culpo with bx x 2 and cryotherapy - ok for paps q3yrs starting 2012    Helicobacter pylori (H. pylori) infection in 2009    Hyperlipidemia LDL goal < 130 02/04/2005    lipitor \"attacked my muscles\" (Hx fibromyalgia); [id number] failed to lower lipids enough&=myalgias also , lovastatin 20mg = myalgias     Hypothyroidism, unspecified type- needs lab follow up and ? refills  01/12/2022    Lyme disease 08/20/2010    Myalgia and myositis, unspecified     fibromyalgia - doing an otc human growth  hormone     OBESITY NOS- hx of      lost 30+ pounds since 2002 breast reduction     Obesity- worsening fibromyalgia, back pain, pcos  12/01/2009    Polycystic ovaries     Postmenopausal since age 52     Shingles 08/20/2010     Past " Surgical History:   Procedure Laterality Date    BREAST SURGERY  15+years ago    reduction    COLONOSCOPY  due again    COLONOSCOPY Left 08/02/2019    Procedure: COLONOSCOPY (FV);  Surgeon: Sung Shanks MD;  Location: [facility]    COMBINED CYSTOSCOPY, INSERT STENT URETER(S) Bilateral 02/13/2018    Procedure: COMBINED CYSTOSCOPY, INSERT STENT URETER(S);  cystoscopy with bilateral ureteral stent placement;  Surgeon: Stephen Muniz MD;  Location: [facility]    COSMETIC SURGERY      CYSTOSCOPY N/A 02/13/2018    Procedure: CYSTOSCOPY;   CYSTOSCOPY;  Surgeon: North Cai MD;  Location: [facility]    DAVINCI HYSTERECTOMY SUPRACERVICAL, SACROCOLPOPEXY, COMBINED N/A 02/13/2018    Procedure: COMBINED DAVINCI HYSTERECTOMY SUPRACERVICAL, SACROCOLPOPEXY;  Cystoscopy, bilateral ureteral stent placement,  Robotic supracervical hysterectomy, bilateral salpingo-oophorectomy and sacrocolpopexy,  cystoscopy, removal of ureteral stents;  Surgeon: North Cai MD;  Location: [facility]    EXAM UNDER ANESTHESIA PELVIC N/A 02/13/2018    Procedure: EXAM UNDER ANESTHESIA PELVIC;;  Surgeon: North Cai MD;  Location: [facility]    HYSTERECTOMY, PAP STILL INDICATED      KNEE SURGERY Right     Meniscal repair    left rotator cuff repair  08/2019    [facility]    ORTHOPEDIC SURGERY  ?    meniscus    SOFT TISSUE SURGERY  2019?    Shoulder - Rotator Cuff    SURGICAL HISTORY OF -   1990's    COLPO WITH BX X2 + CRYO    SURGICAL HISTORY OF -       WISDOM TEETH-SUBSEQ MINDY FXR=ORIF    SURGICAL HISTORY OF -   2002    breast reduction - [facility]     TONSILLECTOMY  at age 32     Current Outpatient Medications   Medication Sig Dispense Refill    Cholecalciferol (VITAMIN D3) 50 MCG (2000 UT) CAPS Take 1 capsule by mouth daily 1 capsule daily  0    doxycycline monohydrate (MONODOX) 100 MG capsule Take 1 capsule (100 mg) by mouth 2 times daily for 14 days. 28 capsule 0    hydrocortisone 2.5 % cream Apply very small amount to  "eyelids up to twice daily 20 g 3    levothyroxine (SYNTHROID/LEVOTHROID) 50 MCG tablet Take 1 tablet (50 mcg) by mouth daily 90 tablet 3    loratadine (CLARITIN) 10 MG tablet Take 1 tablet (10 mg) by mouth 2 times daily  1 YEAR    Omega-3 Fatty Acids (FISH OIL) 875 MG CHEW 1 tab chewable twice daily      rosuvastatin (CRESTOR) 5 MG tablet Take 1 tablet (5 mg) by mouth daily 90 tablet 3    Melatonin ER 5 MG TBCR  (Patient not taking: Reported on 11/19/2024)         Allergies   Allergen Reactions    Atorvastatin      Muscle cramps with lipitor     Cephalexin      Started acyclovir and keflex together.  Unsure if drug reaction or viral exanthem.         Social History     Tobacco Use    Smoking status: Never    Smokeless tobacco: Never   Substance Use Topics    Alcohol use: Yes     Comment: Occasional     Family History   Adopted: Yes   Problem Relation Age of Onset    Breast Cancer Mother         birth mother found through 23&me    Dementia Mother         happened after a hypoxic even    Unknown/Adopted Father         found through 23    Diabetes Father     Hypertension Father     Hyperlipidemia Father     Substance Abuse Father     Obesity Father     Family History Negative Other         pt is adopted - no knowledge of family hx      History   Drug Use No             Review of Systems  Constitutional, HEENT, cardiovascular, pulmonary, GI, , musculoskeletal, neuro, skin, endocrine and psych systems are negative, except as otherwise noted.    Objective    /78 (BP Location: Left arm, Patient Position: Chair, Cuff Size: Adult Large)   Pulse 83   Temp 97.6  F (36.4  C) (Tympanic)   Resp 14   Ht 1.549 m (5' 1\")   Wt 78.9 kg (174 lb)   LMP 06/03/2010 (Approximate)   SpO2 97%   BMI 32.88 kg/m     Estimated body mass index is 32.88 kg/m  as calculated from the following:    Height as of this encounter: 1.549 m (5' 1\").    Weight as of this encounter: 78.9 kg (174 lb).  Physical Exam  GENERAL: alert and no " distress  EYES: Eyes grossly normal to inspection, PERRL and conjunctivae and sclerae normal  HENT: ear canals and TM's normal, nose and mouth without ulcers or lesions  NECK: no adenopathy, no asymmetry, masses, or scars  RESP: lungs clear to auscultation - no rales, rhonchi or wheezes  CV: regular rate and rhythm, normal S1 S2, no S3 or S4, no murmur, click or rub, no peripheral edema  ABDOMEN: soft, nontender, no hepatosplenomegaly, no masses and bowel sounds normal  MS: no gross musculoskeletal defects noted, no edema  SKIN: no suspicious lesions or rashes  NEURO: Normal strength and tone, mentation intact and speech normal  PSYCH: mentation appears normal, affect normal/bright    Recent Labs   Lab Test 07/11/24  1136 04/30/24  0804 03/18/24  0956   HGB  --   --  13.2   PLT  --   --  282     --  141   POTASSIUM 4.6  --  4.4   CR 0.80  --  0.77   A1C 5.4 5.9*  --         Diagnostics  Labs pending at this time.  Results will be reviewed when available.   CBC RESULTS:   Recent Labs   Lab Test 11/19/24  1029   WBC 7.6   RBC 4.49   HGB 13.7   HCT 41.9   MCV 93   MCH 30.5   MCHC 32.7   RDW 12.6        Prediabetes:   Lab Results   Component Value Date    A1C 5.8 11/19/2024    A1C 5.4 07/11/2024    A1C 5.9 04/30/2024    A1C 5.7 10/29/2010       EKG: appears normal, NSR, normal axis, normal intervals, no acute ST/T changes c/w ischemia, no LVH by voltage criteria, unchanged from previous tracings    Revised Cardiac Risk Index (RCRI)  The patient has the following serious cardiovascular risks for perioperative complications:   - No serious cardiac risks = 0 points     RCRI Interpretation: 0 points: Class I (very low risk - 0.4% complication rate)         Signed Electronically by: Phuong Story MD  A copy of this evaluation report is provided to the requesting physician.

## 2024-11-19 NOTE — PATIENT INSTRUCTIONS
How to Take Your Medication Before Surgery  Preoperative Medication Instructions   Antiplatelet or Anticoagulation Medication Instructions:    - Patient is on no antiplatelet or anticoagulation medications.    Additional Medication Instructions:   Take all scheduled medications on the day of surgery - levothyroxine with a sip of water.    - Statins: Continue taking on the day of surgery.    - Herbal medications and vitamins: DO NOT TAKE 14 days prior to surgery.   - ibuprofen (Advil, Motrin): DO NOT TAKE 10 day before surgery.    - naproxen (Aleve, Naprosyn): DO NOT TAKE 10 days before surgery.     Take tylenol only for pain until after surgery.     Recommendation:   Approval given to proceed with proposed procedure, without further diagnostic evaluation.         Patient Education   Preparing for Your Surgery  For Adults  Getting started  In most cases, a nurse will call to review your health history and instructions. They will give you an arrival time based on your scheduled surgery time. Please be ready to share:  Your doctor's clinic name and phone number  Your medical, surgical, and anesthesia history  A list of allergies and sensitivities  A list of medicines, including herbal treatments and over-the-counter drugs  Whether the patient has a legal guardian (ask how to send us the papers in advance)  Note: You may not receive a call if you were seen at our PAC (Preoperative Assessment Center).  Please tell us if you're pregnant--or if there's any chance you might be pregnant. Some surgeries may injure a fetus (unborn baby), so they require a pregnancy test. Surgeries that are safe for a fetus don't always need a test, and you can choose whether to have one.   Preparing for surgery  Within 10 to 30 days of surgery: Have a pre-op exam (sometimes called an H&P, or History and Physical). This can be done at a clinic or pre-operative center.  If you're having a , you may not need this exam. Talk to your care  team.  At your pre-op exam, talk to your care team about all medicines you take. (This includes CBD oil and any drugs, such as THC, marijuana, and other forms of cannabis.) If you need to stop any medicine before surgery, ask when to start taking it again.  This is for your safety. Many medicines and drugs can make you bleed too much during surgery. Some change how well surgery (anesthesia) drugs work.  Call your insurance company to let them know you're having surgery. (If you don't have insurance, call [phone].)  Call your clinic if there's any change in your health. This includes a scrape or scratch near the surgery site, or any signs of a cold (sore throat, runny nose, cough, rash, fever).  Eating and drinking guidelines  For your safety: Unless your surgeon tells you otherwise, follow the guidelines below.  Eat and drink as normal until 8 hours before you arrive for surgery. After that, no food or milk. You can spit out gum when you arrive.  Drink clear liquids until 2 hours before you arrive. These are liquids you can see through, like water, Gatorade, and Propel Water. They also include plain black coffee and tea (no cream or milk).  No alcohol for 24 hours before you arrive. The night before surgery, stop any drinks that contain THC.  If your care team tells you to take medicine on the morning of surgery, it's okay to take it with a sip of water. No other medicines or drugs are allowed (including CBD oil)--follow your care team's instructions.  If you have questions the day of surgery, call your hospital or surgery center.   Preventing infection  Shower or bathe the night before and the morning of surgery. Follow the instructions your clinic gave you. (If no instructions, use regular soap.)  Don't shave or clip hair near your surgery site. We'll remove the hair if needed.  Don't smoke or vape the morning of surgery. No chewing tobacco for 6 hours before you arrive. A nicotine patch is okay. You may spit  out nicotine gum when you arrive.  For some surgeries, the surgeon will tell you to fully quit smoking and nicotine.  We will make every effort to keep you safe from infection. We will:  Clean our hands often with soap and water (or an alcohol-based hand rub).  Clean the skin at your surgery site with a special soap that kills germs.  Give you a special gown to keep you warm. (Cold raises the risk of infection.)  Wear hair covers, masks, gowns, and gloves during surgery.  Give antibiotic medicine, if prescribed. Not all surgeries need this medicine.  What to bring on the day of surgery  Photo ID and insurance card  Copy of your health care directive, if you have one  Glasses and hearing aids (bring cases)  You can't wear contacts during surgery  Inhaler and eye drops, if you use them (tell us about these when you arrive)  CPAP machine or breathing device, if you use them  A few personal items, if spending the night  If you have . . .  A pacemaker, ICD (cardiac defibrillator), or other implant: Bring the ID card.  An implanted stimulator: Bring the remote control.  A legal guardian: Bring a copy of the certified (court-stamped) guardianship papers.  Please remove any jewelry, including body piercings. Leave jewelry and other valuables at home.  If you're going home the day of surgery  You must have a responsible adult drive you home. They should stay with you overnight as well.  If you don't have someone to stay with you, and you aren't safe to go home alone, we may keep you overnight. Insurance often won't pay for this.  After surgery  If it's hard to control your pain or you need more pain medicine, please call your surgeon's office.  Questions?   If you have any questions for your care team, list them here:    ____________________________________________________________________________________________________________________________________________________________________________________________________________________________________________________________  For informational purposes only. Not to replace the advice of your health care provider. Copyright   2003, 2019 [facility]. All rights reserved. Clinically reviewed by Branden Rodgers MD. [employer] [id number] - REV 08/24.

## 2024-11-20 ENCOUNTER — HOSPITAL ENCOUNTER (OUTPATIENT)
Dept: MAMMOGRAPHY | Facility: CLINIC | Age: 69
Discharge: HOME | End: 2024-11-20
Attending: FAMILY MEDICINE
Payer: COMMERCIAL

## 2024-11-20 DIAGNOSIS — Z12.31 VISIT FOR SCREENING MAMMOGRAM: ICD-10-CM

## 2024-11-20 PROCEDURE — 77067 SCR MAMMO BI INCL CAD: CPT

## 2024-11-20 PROCEDURE — 77063 BREAST TOMOSYNTHESIS BI: CPT

## 2024-12-13 ENCOUNTER — TRANSFERRED RECORDS (OUTPATIENT)
Dept: HEALTH INFORMATION MANAGEMENT | Facility: CLINIC | Age: 69
End: 2024-12-13
Payer: COMMERCIAL

## 2025-01-16 ENCOUNTER — TRANSFERRED RECORDS (OUTPATIENT)
Dept: HEALTH INFORMATION MANAGEMENT | Facility: CLINIC | Age: 70
End: 2025-01-16
Payer: COMMERCIAL

## 2025-01-28 ENCOUNTER — TELEPHONE (OUTPATIENT)
Dept: FAMILY MEDICINE | Facility: CLINIC | Age: 70
End: 2025-01-28
Payer: COMMERCIAL

## 2025-01-28 NOTE — TELEPHONE ENCOUNTER
Do you recommend she get RSV vaccine?   She doesn't qualify but she talked to her insurance and they said she can get it at no cost.     Routing to provider to review and advise.     SRIDHAR BURTON RN on 1/28/2025 at 1:16 PM   [facility]

## 2025-02-03 NOTE — TELEPHONE ENCOUNTER
"Yes, recommend RSV vaccine for this patient.  She can get it at our pharmacy.     You can do your vaccinations/immunizations   in any [facility] pharmacy:     To make a pharmacy only appointment online, please go to [email or web address] and select \"Click here to schedule a Vaccination or Blood Pressure Check at available [facility] \" at the bottom of the first page.  You can then select a location, then date and time bubbles that best fits your schedule.        Sent [employer] message to pt.   "

## 2025-02-25 ENCOUNTER — OFFICE VISIT (OUTPATIENT)
Dept: FAMILY MEDICINE | Facility: CLINIC | Age: 70
End: 2025-02-25
Payer: COMMERCIAL

## 2025-02-25 VITALS
TEMPERATURE: 98.2 F | DIASTOLIC BLOOD PRESSURE: 78 MMHG | HEIGHT: 61 IN | WEIGHT: 174.3 LBS | HEART RATE: 73 BPM | RESPIRATION RATE: 20 BRPM | SYSTOLIC BLOOD PRESSURE: 128 MMHG | OXYGEN SATURATION: 99 % | BODY MASS INDEX: 32.91 KG/M2

## 2025-02-25 DIAGNOSIS — Z01.818 PREOP GENERAL PHYSICAL EXAM: Primary | ICD-10-CM

## 2025-02-25 DIAGNOSIS — M79.7 FIBROMYALGIA: ICD-10-CM

## 2025-02-25 DIAGNOSIS — K20.0 EOSINOPHILIC ESOPHAGITIS: ICD-10-CM

## 2025-02-25 DIAGNOSIS — S46.002A ROTATOR CUFF INJURY, LEFT, INITIAL ENCOUNTER: ICD-10-CM

## 2025-02-25 DIAGNOSIS — R73.03 PREDIABETES: ICD-10-CM

## 2025-02-25 DIAGNOSIS — E66.811 CLASS 1 OBESITY DUE TO EXCESS CALORIES WITH SERIOUS COMORBIDITY AND BODY MASS INDEX (BMI) OF 32.0 TO 32.9 IN ADULT: ICD-10-CM

## 2025-02-25 DIAGNOSIS — E55.9 VITAMIN D DEFICIENCY: ICD-10-CM

## 2025-02-25 DIAGNOSIS — E66.09 CLASS 1 OBESITY DUE TO EXCESS CALORIES WITH SERIOUS COMORBIDITY AND BODY MASS INDEX (BMI) OF 32.0 TO 32.9 IN ADULT: ICD-10-CM

## 2025-02-25 DIAGNOSIS — E78.5 HYPERLIPIDEMIA WITH TARGET LDL LESS THAN 130: ICD-10-CM

## 2025-02-25 DIAGNOSIS — E03.9 HYPOTHYROIDISM, UNSPECIFIED TYPE: ICD-10-CM

## 2025-02-25 DIAGNOSIS — Z78.0 ASYMPTOMATIC MENOPAUSAL STATE: ICD-10-CM

## 2025-02-25 DIAGNOSIS — M17.11 PRIMARY OSTEOARTHRITIS OF RIGHT KNEE: ICD-10-CM

## 2025-02-25 PROBLEM — S46.012D TRAUMATIC TEAR OF LEFT ROTATOR CUFF, UNSPECIFIED TEAR EXTENT, SUBSEQUENT ENCOUNTER: Status: ACTIVE | Noted: 2019-06-26

## 2025-02-25 PROBLEM — S46.812D: Status: ACTIVE | Noted: 2019-08-14

## 2025-02-25 PROBLEM — E03.8 SUBCLINICAL HYPOTHYROIDISM: Status: RESOLVED | Noted: 2022-01-12 | Resolved: 2025-02-25

## 2025-02-25 LAB
ERYTHROCYTE [DISTWIDTH] IN BLOOD BY AUTOMATED COUNT: 12.9 % (ref 10–15)
EST. AVERAGE GLUCOSE BLD GHB EST-MCNC: 111 MG/DL
HBA1C MFR BLD: 5.5 % (ref 0–5.6)
HCT VFR BLD AUTO: 39.1 % (ref 35–47)
HGB BLD-MCNC: 12.9 G/DL (ref 11.7–15.7)
MCH RBC QN AUTO: 31 PG (ref 26.5–33)
MCHC RBC AUTO-ENTMCNC: 33 G/DL (ref 31.5–36.5)
MCV RBC AUTO: 94 FL (ref 78–100)
PLATELET # BLD AUTO: 327 10E3/UL (ref 150–450)
RBC # BLD AUTO: 4.16 10E6/UL (ref 3.8–5.2)
WBC # BLD AUTO: 9 10E3/UL (ref 4–11)

## 2025-02-25 PROCEDURE — 84480 ASSAY TRIIODOTHYRONINE (T3): CPT | Performed by: FAMILY MEDICINE

## 2025-02-25 PROCEDURE — 83036 HEMOGLOBIN GLYCOSYLATED A1C: CPT | Performed by: FAMILY MEDICINE

## 2025-02-25 PROCEDURE — 80053 COMPREHEN METABOLIC PANEL: CPT | Performed by: FAMILY MEDICINE

## 2025-02-25 PROCEDURE — 84443 ASSAY THYROID STIM HORMONE: CPT | Performed by: FAMILY MEDICINE

## 2025-02-25 PROCEDURE — 85027 COMPLETE CBC AUTOMATED: CPT | Performed by: FAMILY MEDICINE

## 2025-02-25 PROCEDURE — 99214 OFFICE O/P EST MOD 30 MIN: CPT | Performed by: FAMILY MEDICINE

## 2025-02-25 PROCEDURE — 3074F SYST BP LT 130 MM HG: CPT | Performed by: FAMILY MEDICINE

## 2025-02-25 PROCEDURE — 3078F DIAST BP <80 MM HG: CPT | Performed by: FAMILY MEDICINE

## 2025-02-25 PROCEDURE — 36415 COLL VENOUS BLD VENIPUNCTURE: CPT | Performed by: FAMILY MEDICINE

## 2025-02-25 PROCEDURE — 84439 ASSAY OF FREE THYROXINE: CPT | Performed by: FAMILY MEDICINE

## 2025-02-25 NOTE — PROGRESS NOTES
Preoperative Evaluation  [address] [address]  Phone: [phone]  Primary Provider: Phuong Story MD  Pre-op Performing Provider: Phuong Story MD  Feb 25, 2025 2/20/2025   Surgical Information   What procedure is being done? Right total knee replacement   Facility or Hospital where procedure/surgery will be performed: [facility] [place], [place]   Who is doing the procedure / surgery? Dr. Nathaniel Lawrence   Date of surgery / procedure: 03/04/2025   Time of surgery / procedure: 7:00am   Where do you plan to recover after surgery? at home with family-  [place]      Fax number for surgical facility: [phone]    Assessment & Plan :     The proposed surgical procedure is considered INTERMEDIATE risk.      ICD-10-CM    1. Preop general physical exam  Z01.818 TSH     T4 free     T3 total     Hemoglobin A1c     Comprehensive metabolic panel     CBC with platelets     25 Hydroxyvitamin D2 and D3      2. Primary osteoarthritis of right knee  M17.11       3. Rotator cuff injury, left, initial encounter- new early 2/2025- after doing pulling exercise s/p left TKA - s/p left rotator cuff repair 8/2019  S46.002A       4. Prediabetes- new diagnosis 4/2024  R73.03 REVIEW OF HEALTH MAINTENANCE PROTOCOL ORDERS     Hemoglobin A1c     Comprehensive metabolic panel     CBC with platelets      5. Hypothyroidism, unspecified type- needs lab follow up and ? refills   E03.9 TSH     T4 free     T3 total      6. Hyperlipidemia with target LDL less than 130  E78.5 Comprehensive metabolic panel      7. Eosinophilic esophagitis- per EGD biopsies - will treat with fluticasone oral inhaler  K20.0       8. Class 1 obesity due to excess calories with serious comorbidity and body mass index (BMI) of 32.0 to 32.9 in adult  E66.811     E66.09     Z68.32       9. Asymptomatic menopausal state  Z78.0       10. Fibromyalgia  M79.7        11. Vitamin D deficiency- mild   E55.9 25 Hydroxyvitamin D2 and D3              Possible Sleep Apnea: snores  Pt declines sleep study at this time.  She will consider it for after her knee surgery.          Risks and Recommendations  The patient has the following additional risks and recommendations for perioperative complications:   - No identified additional risk factors other than previously addressed    Antiplatelet or Anticoagulation Medication Instructions   - We reviewed the medication list and the patient is not on an antiplatelet or anticoagulation medications.    Additional Medication Instructions   - Herbal medications and vitamins: DO NOT TAKE 14 days prior to surgery. Pt was taking Airborne supplement - her last dose was this morning.  Has high doses of vitamin C and multiple herbal meds in it.    - Statins (rosuvastatin) : Continue taking on the day of surgery.   - levothyroxine - Continue taking on the day of surgery.   Naproxen - stop 7-10 days prior to surgery.      Ok for tylenol only for pain until after surgery.     Recommendation:   Approval given to proceed with proposed procedure, without further diagnostic evaluation.    Subjective   Edwina Parsons is a 69 year old, presenting for the following:  Pre-Op Exam  and the following other medical problems:      1. Preop general physical exam    2. Primary osteoarthritis of right knee    3. Rotator cuff injury, left, initial encounter- new early 2/2025- after doing pulling exercise s/p left TKA - s/p left rotator cuff repair 8/2019    4. Prediabetes- new diagnosis 4/2024    5. Hypothyroidism, unspecified type- needs lab follow up and ? refills     6. Hyperlipidemia with target LDL less than 130    7. Eosinophilic esophagitis- per EGD biopsies - will treat with fluticasone oral inhaler    8. Class 1 obesity due to excess calories with serious comorbidity and body mass index (BMI) of 32.0 to 32.9 in adult    9. Asymptomatic menopausal state    10.  Fibromyalgia    11. Vitamin D deficiency- mild               2/25/2025     1:00 PM   Additional Questions   Roomed by bam morales   Accompanied by self     HPI related to upcoming procedure: bilateral severe knee osteoarthritis for years = increasing pain and limiting of activities.  S/p left TKA on 12/3/2024 without complication.  Is now ready for the right TKA.             2/20/2025   Pre-Op Questionnaire   Have you ever had a heart attack or stroke? No   Have you ever had surgery on your heart or blood vessels, such as a stent placement, a coronary artery bypass, or surgery on an artery in your head, neck, heart, or legs? No   Do you have chest pain with activity? No   Do you have a history of heart failure? No   Do you currently have a cold, bronchitis or symptoms of other infection? No   Do you have a cough, shortness of breath, or wheezing? No   Do you or anyone in your family have previous history of blood clots? No   Do you or does anyone in your family have a serious bleeding problem such as prolonged bleeding following surgeries or cuts? No   Have you ever had problems with anemia or been told to take iron pills? (!) YES after vaginal deliveries.    Have you had any abnormal blood loss such as black, tarry or bloody stools, or abnormal vaginal bleeding? No   Have you ever had a blood transfusion? No   Are you willing to have a blood transfusion if it is medically needed before, during, or after your surgery? Yes   Have you or any of your relatives ever had problems with anesthesia? No   Do you have sleep apnea, excessive snoring or daytime drowsiness? (!) UNKNOWN- snores.    Do you have any artifical heart valves or other implanted medical devices like a pacemaker, defibrillator, or continuous glucose monitor? No   Do you have artificial joints? (!) YES- left knee    Are you allergic to latex? No     Health Care Directive  Patient has a Health Care Directive on file      Preoperative Review of     reviewed - no record of controlled substances prescribed.      Status of Chronic Conditions:  See problem list for active medical problems.  Problems all longstanding and stable, except as noted/documented.  See ROS for pertinent symptoms related to these conditions.    Immunization History   Administered Date(s) Administered    COVID-19 12+ (Pfizer) 01/28/2025    COVID-19 Bivalent 18+ (Moderna) 12/07/2022, 08/15/2023    COVID-19 Monovalent 18+ (Moderna) 01/23/2021, 02/19/2021, 11/29/2021    Flu, Unspecified 09/29/2013    HEPA 10/22/2002, 03/16/2007    HepB 11/02/2001, 12/18/2001, 10/22/2002    Influenza (High Dose) Trivalent,PF (Fluzone) 11/19/2024    Influenza (IIV3) PF 01/26/2005, 10/29/2010, 11/11/2011, 10/10/2012, 12/05/2014    Influenza Vaccine 18-64 (Flublok) 12/16/2020    Influenza Vaccine 65+ (Fluzone HD) 11/07/2022, 09/28/2023    Influenza Vaccine >6 months,quad, PF 09/18/2017, 11/05/2018, 10/02/2019, 11/29/2021    MMR 11/02/2001, 12/18/2001    Mantoux Tuberculin Skin Test 06/06/2001    Pneumococcal 20 valent Conjugate (Prevnar 20) 02/13/2023    RSV Vaccine (Arexvy) 02/18/2025    TD,PF 7+ (Tenivac) 10/22/2002, 02/04/2005    TDAP (Adacel,Boostrix) 08/07/2024    TDAP Vaccine (Adacel) 11/05/2018    TDAP Vaccine (Boostrix) 03/27/2013    Typhoid IM 06/24/2015    Typhoid Oral 02/04/2005    Yellow Fever 01/28/2003, 02/04/2005    Zoster recombinant adjuvanted (SHINGRIX) 06/29/2019, 11/19/2019        Patient Active Problem List    Diagnosis Date Noted    Hyperlipidemia with target LDL less than 130 09/23/2015     Priority: High     Diagnosis updated by automated process. Provider to review and confirm.      Pill dysphagia 08/28/2014     Priority: High    Snoring 11/19/2024     Priority: Medium    At risk for sleep apnea 11/19/2024     Priority: Medium    Prediabetes- new diagnosis 4/2024 11/19/2024     Priority: Medium    Encounter for completion of form with patient- for FM/a test for fibromyalgia through Epic  [facility] 09/28/2023     Priority: Medium    Fall on or from stairs or steps, initial encounter 2 weeks prior to 8/2/2023 =  myalgias and some mild joint pains - no bony pains 08/20/2023     Priority: Medium    Fibromyalgia 06/28/2023     Priority: Medium    Class 1 obesity due to excess calories with serious comorbidity and body mass index (BMI) of 32.0 to 32.9 in adult 06/28/2023     Priority: Medium    Exercise counseling 06/28/2023     Priority: Medium    Chronic pain of both knees 03/28/2023     Priority: Medium    Metatarsalgia of both feet 02/13/2023     Priority: Medium    Sun-damaged skin 02/13/2023     Priority: Medium    Bilateral foot pain 02/13/2023     Priority: Medium    Multiple pigmented nevi 02/13/2023     Priority: Medium    Primary osteoarthritis of both knees 02/13/2023     Priority: Medium    Chronic pain of both shoulders 02/13/2023     Priority: Medium    Screening, anemia, deficiency, iron 02/13/2023     Priority: Medium    Hypothyroidism, unspecified type- needs lab follow up and ? refills  01/12/2022     Priority: Medium    Traumatic rupture of supraspinatus tendon of left shoulder, subsequent encounter- s/p repair 8/2019 08/14/2019     Priority: Medium    Fullness of supraclavicular fossa- soft, indistinct - left greater than right  08/14/2019     Priority: Medium    Traumatic tear of left rotator cuff, unspecified tear extent, subsequent encounter- seeing [facility] - after fall on ice 1/2019 - now s/p repair 8/2019 06/26/2019     Priority: Medium    Cystocele with uterine descensus 01/04/2018     Priority: Medium    History of obesity - pt's PCOS, GERD, fibromyalgia = much better since weight loss  09/18/2017     Priority: Medium    Right rotator cuff tendonitis 09/18/2017     Priority: Medium    Eosinophilic esophagitis- per EGD biopsies - will treat with fluticasone oral inhaler 09/25/2014      Priority: Medium    Fatigue 07/30/2014     Priority: Medium    Gluten intolerance- went gluten-free 3/2014  07/30/2014     Priority: Medium    External hemorrhoids 03/27/2013     Priority: Medium    Asymptomatic menopausal state      Priority: Medium    Mixed incontinence urge and stress  (female) 11/11/2011     Priority: Medium    Vitamin D deficiency- mild  11/15/2010     Priority: Medium    Lyme disease      Priority: Medium    Shingles      Priority: Medium    Gastroesophageal reflux disease with esophagitis without hemorrhage 02/26/2010     Priority: Medium    h/o cervical dysplasia - s/p cryotherapy in 90's - normal paps since - YISEL 1 - ok for q3 yr paps starting 2012 02/01/2010     Priority: Medium    Knee sprain 12/04/2009     Priority: Medium    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 12/01/2009     Priority: Medium    Eating Disorder- overeating/binging  12/01/2009     Priority: Medium    Cervicalgia 10/20/2009     Priority: Medium    Lumbago 10/20/2009     Priority: Medium    Nonallopathic lesion of cervical region 10/20/2009     Priority: Medium     Problem list name updated by automated process. Provider to review      Nonallopathic lesion of thoracic region 10/20/2009     Priority: Medium     Problem list name updated by automated process. Provider to review      Dysphagia-had esophagram 12/07 - tertiary contractions of esoph with some retention, EGD showed gastritis, duodenitis and esophagitis ,pt didn't start a PPI   09/10/2008     Priority: Medium     had esophagram 12/07 - tertiary contractions of esoph with some retention, EGD showed gastritis, duodenitis and esophagitis ,pt didn't start a PPI        Gastritis, duodenitis, esophagitis  09/10/2008     Priority: Medium    Allergic rhinitis 08/08/2006     Priority: Medium     Problem list name updated by automated process. Provider to review      Allergic state 06/24/2005     Priority: Medium     Problem list name updated by automated process. Provider  "to review      Chronic fatigue 02/04/2005     Priority: Medium     fibromyalgia - doing an otc human growth  hormone   Problem list name updated by automated process. Provider to review        Past Medical History:   Diagnosis Date    Allergic rhinitis, cause unspecified     Arthritis     Dysphagia     had esophagram 12/07 - tertiary contractions of esoph with some retention, EGD showed gastritis, duodenitis and esophagitis ,pt didn't start a PPI      Fatigue     Fatigue     Gastroesophageal reflux disease     H/O YISEL I     s/p culpo with bx x 2 and cryotherapy - ok for paps q3yrs starting 2012    Helicobacter pylori (H. pylori) infection in 2009    Hyperlipidemia LDL goal < 130 02/04/2005    lipitor \"attacked my muscles\" (Hx fibromyalgia); [id number] failed to lower lipids enough&=myalgias also , lovastatin 20mg = myalgias     Hypothyroidism, unspecified type- needs lab follow up and ? refills  01/12/2022    Lyme disease 08/20/2010    Myalgia and myositis, unspecified     fibromyalgia - doing an otc human growth  hormone     OBESITY NOS- hx of      lost 30+ pounds since 2002 breast reduction     Obesity- worsening fibromyalgia, back pain, pcos  12/01/2009    Polycystic ovaries     Postmenopausal since age 52     Shingles 08/20/2010     Past Surgical History:   Procedure Laterality Date    BREAST SURGERY  15+years ago    reduction    COLONOSCOPY  due again    COLONOSCOPY Left 08/02/2019    Procedure: COLONOSCOPY (FV);  Surgeon: Sung Shanks MD;  Location:  GI    COMBINED CYSTOSCOPY, INSERT STENT URETER(S) Bilateral 02/13/2018    Procedure: COMBINED CYSTOSCOPY, INSERT STENT URETER(S);  cystoscopy with bilateral ureteral stent placement;  Surgeon: Stephen Muniz MD;  Location:  OR    COSMETIC SURGERY      CYSTOSCOPY N/A 02/13/2018    Procedure: CYSTOSCOPY;   CYSTOSCOPY;  Surgeon: North Cai MD;  Location: [facility] OR    DAVINCI HYSTERECTOMY SUPRACERVICAL, SACROCOLPOPEXY, COMBINED N/A " 02/13/2018    Procedure: COMBINED DAVINCI HYSTERECTOMY SUPRACERVICAL, SACROCOLPOPEXY;  Cystoscopy, bilateral ureteral stent placement,  Robotic supracervical hysterectomy, bilateral salpingo-oophorectomy and sacrocolpopexy,  cystoscopy, removal of ureteral stents;  Surgeon: North Cai MD;  Location: [facility] OR    EXAM UNDER ANESTHESIA PELVIC N/A 02/13/2018    Procedure: EXAM UNDER ANESTHESIA PELVIC;;  Surgeon: North Cai MD;  Location: [facility] OR    HYSTERECTOMY, PAP STILL INDICATED      KNEE SURGERY Right     Meniscal repair    left rotator cuff repair  08/2019    [facility]    left total knee arthroplasty Left 12/03/2024    Dr. Nathaniel Lawrence - [facility]    SOFT TISSUE SURGERY  2019?    Shoulder - Rotator Cuff    SURGICAL HISTORY OF -   1990's    COLPO WITH BX X2 + CRYO    SURGICAL HISTORY OF -       WISDOM TEETH-SUBSEQ MINDY FXR=ORIF    SURGICAL HISTORY OF -   2002    breast reduction - [facility]     TONSILLECTOMY  at age 32     Current Outpatient Medications   Medication Sig Dispense Refill    Cholecalciferol (VITAMIN D3) 50 MCG (2000 UT) CAPS Take 1 capsule by mouth daily 1 capsule daily  0    hydrocortisone 2.5 % cream Apply very small amount to eyelids up to twice daily 20 g 3    levothyroxine (SYNTHROID/LEVOTHROID) 50 MCG tablet Take 1 tablet (50 mcg) by mouth daily 90 tablet 3    loratadine (CLARITIN) 10 MG tablet Take 1 tablet (10 mg) by mouth 2 times daily  1 YEAR    Omega-3 Fatty Acids (FISH OIL) 875 MG CHEW 1 tab chewable twice daily      rosuvastatin (CRESTOR) 5 MG tablet Take 1 tablet (5 mg) by mouth daily 90 tablet 3       Allergies   Allergen Reactions    Atorvastatin      Muscle cramps with lipitor     Cephalexin      Started acyclovir and keflex together.  Unsure if drug reaction or viral exanthem.         Social History     Tobacco Use    Smoking status: Never    Smokeless tobacco: Never   Substance Use Topics    Alcohol use: Yes     Comment: Occasional     Family  "History   Adopted: Yes   Problem Relation Age of Onset    Breast Cancer Mother         birth mother found through 23&me    Dementia Mother         happened after a hypoxic even    Unknown/Adopted Father         found through 23    Diabetes Father     Hypertension Father     Hyperlipidemia Father     Substance Abuse Father     Obesity Father     Family History Negative Other         pt is adopted - no knowledge of family hx      History   Drug Use No         Review of Systems  Constitutional, HEENT, cardiovascular, pulmonary, GI, , musculoskeletal, neuro, skin, endocrine and psych systems are negative, except as otherwise noted.    Objective    /78   Pulse 73   Temp 98.2  F (36.8  C) (Tympanic)   Resp 20   Ht 1.549 m (5' 0.98\")   Wt 79.1 kg (174 lb 4.8 oz)   LMP 06/03/2010 (Approximate)   SpO2 99%   BMI 32.95 kg/m     Estimated body mass index is 32.95 kg/m  as calculated from the following:    Height as of this encounter: 1.549 m (5' 0.98\").    Weight as of this encounter: 79.1 kg (174 lb 4.8 oz).  Physical Exam  GENERAL: alert and no distress  EYES: Eyes grossly normal to inspection, PERRL and conjunctivae and sclerae normal  HENT: ear canals and TM's normal, nose and mouth without ulcers or lesions  NECK: no adenopathy, no asymmetry, masses, or scars  RESP: lungs clear to auscultation - no rales, rhonchi or wheezes  CV: regular rate and rhythm, normal S1 S2, no S3 or S4, no murmur, click or rub, no peripheral edema  ABDOMEN: soft, nontender, no hepatosplenomegaly, no masses and bowel sounds normal  MS: no gross musculoskeletal defects noted, no edema  SKIN: no suspicious lesions or rashes  NEURO: Normal strength and tone, mentation intact and speech normal  PSYCH: mentation appears normal, affect normal/bright    Recent Labs   Lab Test 11/19/24  1029 07/11/24  1136 04/30/24  0804 03/18/24  0956   HGB 13.7  --   --  13.2     --   --  282    141  --  141   POTASSIUM 4.5 4.6  --  4.4   CR " 0.76 0.80  --  0.77   A1C 5.8* 5.4   < >  --     < > = values in this interval not displayed.        Diagnostics  Labs pending at this time.  Results will be reviewed when available.   EKG from 11/19/2024:   EKG: appears normal, NSR, normal axis, normal intervals, no acute ST/T changes c/w ischemia, no LVH by voltage criteria, unchanged from previous tracings    Revised Cardiac Risk Index (RCRI)  The patient has the following serious cardiovascular risks for perioperative complications:   - No serious cardiac risks = 0 points     RCRI Interpretation: 0 points: Class I (very low risk - 0.4% complication rate)         Signed Electronically by:        Phuong Story MD

## 2025-02-25 NOTE — PATIENT INSTRUCTIONS
How to Take Your Medication Before Surgery  Preoperative Medication Instructions     Antiplatelet or Anticoagulation Medication Instructions   - We reviewed the medication list and the patient is not on an antiplatelet or anticoagulation medications.    Additional Medication Instructions   - Herbal medications and vitamins: DO NOT TAKE 14 days prior to surgery. Pt was taking Airborne supplement - her last dose was this morning.  Has high doses of vitamin C and multiple herbal meds in it.    - Statins (rosuvastatin) : Continue taking on the day of surgery.   - levothyroxine - Continue taking on the day of surgery.   Naproxen - stop 7-10 days prior to surgery.      Ok for tylenol only for pain until after surgery.          Patient Education   Preparing for Your Surgery  For Adults  Getting started  In most cases, a nurse will call to review your health history and instructions. They will give you an arrival time based on your scheduled surgery time. Please be ready to share:  Your doctor's clinic name and phone number  Your medical, surgical, and anesthesia history  A list of allergies and sensitivities  A list of medicines, including herbal treatments and over-the-counter drugs  Whether the patient has a legal guardian (ask how to send us the papers in advance)  Note: You may not receive a call if you were seen at our PAC (Preoperative Assessment Center).  Please tell us if you're pregnant--or if there's any chance you might be pregnant. Some surgeries may injure a fetus (unborn baby), so they require a pregnancy test. Surgeries that are safe for a fetus don't always need a test, and you can choose whether to have one.   Preparing for surgery  Within 10 to 30 days of surgery: Have a pre-op exam (sometimes called an H&P, or History and Physical). This can be done at a clinic or pre-operative center.  If you're having a , you may not need this exam. Talk to your care team.  At your pre-op exam, talk to your  care team about all medicines you take. (This includes CBD oil and any drugs, such as THC, marijuana, and other forms of cannabis.) If you need to stop any medicine before surgery, ask when to start taking it again.  This is for your safety. Many medicines and drugs can make you bleed too much during surgery. Some change how well surgery (anesthesia) drugs work.  Call your insurance company to let them know you're having surgery. (If you don't have insurance, call [phone].)  Call your clinic if there's any change in your health. This includes a scrape or scratch near the surgery site, or any signs of a cold (sore throat, runny nose, cough, rash, fever).  Eating and drinking guidelines  For your safety: Unless your surgeon tells you otherwise, follow the guidelines below.  Eat and drink as normal until 8 hours before you arrive for surgery. After that, no food or milk. You can spit out gum when you arrive.  Drink clear liquids until 2 hours before you arrive. These are liquids you can see through, like water, Gatorade, and Propel Water. They also include plain black coffee and tea (no cream or milk).  No alcohol for 24 hours before you arrive. The night before surgery, stop any drinks that contain THC.  If your care team tells you to take medicine on the morning of surgery, it's okay to take it with a sip of water. No other medicines or drugs are allowed (including CBD oil)--follow your care team's instructions.  If you have questions the day of surgery, call your hospital or surgery center.   Preventing infection  Shower or bathe the night before and the morning of surgery. Follow the instructions your clinic gave you. (If no instructions, use regular soap.)  Don't shave or clip hair near your surgery site. We'll remove the hair if needed.  Don't smoke or vape the morning of surgery. No chewing tobacco for 6 hours before you arrive. A nicotine patch is okay. You may spit out nicotine gum when you arrive.  For  some surgeries, the surgeon will tell you to fully quit smoking and nicotine.  We will make every effort to keep you safe from infection. We will:  Clean our hands often with soap and water (or an alcohol-based hand rub).  Clean the skin at your surgery site with a special soap that kills germs.  Give you a special gown to keep you warm. (Cold raises the risk of infection.)  Wear hair covers, masks, gowns, and gloves during surgery.  Give antibiotic medicine, if prescribed. Not all surgeries need this medicine.  What to bring on the day of surgery  Photo ID and insurance card  Copy of your health care directive, if you have one  Glasses and hearing aids (bring cases)  You can't wear contacts during surgery  Inhaler and eye drops, if you use them (tell us about these when you arrive)  CPAP machine or breathing device, if you use them  A few personal items, if spending the night  If you have . . .  A pacemaker, ICD (cardiac defibrillator), or other implant: Bring the ID card.  An implanted stimulator: Bring the remote control.  A legal guardian: Bring a copy of the certified (court-stamped) guardianship papers.  Please remove any jewelry, including body piercings. Leave jewelry and other valuables at home.  If you're going home the day of surgery  You must have a responsible adult drive you home. They should stay with you overnight as well.  If you don't have someone to stay with you, and you aren't safe to go home alone, we may keep you overnight. Insurance often won't pay for this.  After surgery  If it's hard to control your pain or you need more pain medicine, please call your surgeon's office.  Questions?   If you have any questions for your care team, list them here:    ____________________________________________________________________________________________________________________________________________________________________________________________________________________________________________________________  For informational purposes only. Not to replace the advice of your health care provider. Copyright   2003, 2019 [facility]. All rights reserved. Clinically reviewed by Branden Rodgers MD. [employer] 518504 - REV 08/24.

## 2025-02-26 LAB
ALBUMIN SERPL BCG-MCNC: 4.4 G/DL (ref 3.5–5.2)
ALP SERPL-CCNC: 60 U/L (ref 40–150)
ALT SERPL W P-5'-P-CCNC: 24 U/L (ref 0–50)
ANION GAP SERPL CALCULATED.3IONS-SCNC: 13 MMOL/L (ref 7–15)
AST SERPL W P-5'-P-CCNC: 30 U/L (ref 0–45)
BILIRUB SERPL-MCNC: 0.4 MG/DL
BUN SERPL-MCNC: 11.4 MG/DL (ref 8–23)
CALCIUM SERPL-MCNC: 9.5 MG/DL (ref 8.8–10.4)
CHLORIDE SERPL-SCNC: 101 MMOL/L (ref 98–107)
CREAT SERPL-MCNC: 0.73 MG/DL (ref 0.51–0.95)
EGFRCR SERPLBLD CKD-EPI 2021: 90 ML/MIN/1.73M2
GLUCOSE SERPL-MCNC: 82 MG/DL (ref 70–99)
HCO3 SERPL-SCNC: 25 MMOL/L (ref 22–29)
POTASSIUM SERPL-SCNC: 4.1 MMOL/L (ref 3.4–5.3)
PROT SERPL-MCNC: 7.3 G/DL (ref 6.4–8.3)
SODIUM SERPL-SCNC: 139 MMOL/L (ref 135–145)
T3 SERPL-MCNC: 131 NG/DL (ref 85–202)
T4 FREE SERPL-MCNC: 1.24 NG/DL (ref 0.9–1.7)
TSH SERPL DL<=0.005 MIU/L-ACNC: 1.35 UIU/ML (ref 0.3–4.2)

## 2025-03-13 LAB
DEPRECATED CALCIDIOL+CALCIFEROL SERPL-MC: <46 UG/L (ref 20–75)
VITAMIN D2 SERPL-MCNC: <5 UG/L
VITAMIN D3 SERPL-MCNC: 41 UG/L

## 2025-04-17 ENCOUNTER — TRANSFERRED RECORDS (OUTPATIENT)
Dept: HEALTH INFORMATION MANAGEMENT | Facility: CLINIC | Age: 70
End: 2025-04-17
Payer: COMMERCIAL

## 2025-04-17 DIAGNOSIS — E78.5 HYPERLIPIDEMIA WITH TARGET LDL LESS THAN 130: ICD-10-CM

## 2025-04-17 DIAGNOSIS — E03.9 HYPOTHYROIDISM, UNSPECIFIED TYPE: ICD-10-CM

## 2025-04-17 RX ORDER — LEVOTHYROXINE SODIUM 50 UG/1
50 TABLET ORAL DAILY
Qty: 90 TABLET | Refills: 2 | Status: SHIPPED | OUTPATIENT
Start: 2025-04-17

## 2025-04-20 RX ORDER — ROSUVASTATIN CALCIUM 5 MG/1
5 TABLET, COATED ORAL DAILY
Qty: 90 TABLET | Refills: 3 | Status: SHIPPED | OUTPATIENT
Start: 2025-04-20

## 2025-04-21 NOTE — TELEPHONE ENCOUNTER
Recent Labs   Lab Test 03/18/24  0956 10/24/23  0748   CHOL 184 194   HDL 58 52   LDL 90 104*   TRIG 179* 190*     Unfortunately, at pt's last lab visit, lipids were not ordered.    Recommend fasting lipids check in the next month.   Future orders placed in Epic (our computer record)  for labs.     We've entered future orders for this and you can do this as a lab only appointment at our clinic.  Please call [phone] and press #2 to speak with your care team to schedule this or you can schedule it on artandseek at your convenience.  You can also have this done at any [facility] without appointment during regular outpatient lab hours.   [facility] outpatient lab hours 7am-7pm Monday-Friday, and 9am -noon Saturdays and Sundays .  They close promptly at 7pm weekdays and 12 noon on the weekends.     Sent pt Highlighter message.

## 2025-04-29 ENCOUNTER — PATIENT OUTREACH (OUTPATIENT)
Dept: CARE COORDINATION | Facility: CLINIC | Age: 70
End: 2025-04-29
Payer: COMMERCIAL

## 2025-06-14 ENCOUNTER — HEALTH MAINTENANCE LETTER (OUTPATIENT)
Age: 70
End: 2025-06-14

## 2025-06-16 ENCOUNTER — TELEPHONE (OUTPATIENT)
Dept: FAMILY MEDICINE | Facility: CLINIC | Age: 70
End: 2025-06-16
Payer: COMMERCIAL

## 2025-06-16 NOTE — TELEPHONE ENCOUNTER
Reason for Call:  Appointment Request    Patient requesting this type of appt:  Preventive     Requested provider: Phuong Story    Reason patient unable to be scheduled: She has an appt scheduled on 6/27, but would like to see if she can reschedule it to a later time    When does patient want to be seen/preferred time: Anytime after the 4th of July, but before the end of July.    Comments: She has a appt scheduled currently for 6/27 but has a scheduling issue that day. She said is ok with a later appt, like in July. She wants to keep the 6/27  in case there is nothing later.    Could we send this information to you in [employer] or would you prefer to receive a phone call?:   No preference   Okay to leave a detailed message?: Yes at Cell number on file:    Telephone Information:   Mobile [phone]       Call taken on 6/16/2025 at 10:36 AM by Laura Massey

## 2025-06-24 ENCOUNTER — TRANSFERRED RECORDS (OUTPATIENT)
Dept: HEALTH INFORMATION MANAGEMENT | Facility: CLINIC | Age: 70
End: 2025-06-24
Payer: COMMERCIAL

## 2025-07-07 ENCOUNTER — MYC MEDICAL ADVICE (OUTPATIENT)
Dept: FAMILY MEDICINE | Facility: CLINIC | Age: 70
End: 2025-07-07
Payer: COMMERCIAL

## 2025-07-10 ENCOUNTER — MYC MEDICAL ADVICE (OUTPATIENT)
Dept: FAMILY MEDICINE | Facility: CLINIC | Age: 70
End: 2025-07-10
Payer: COMMERCIAL

## 2025-07-10 NOTE — TELEPHONE ENCOUNTER
Robyn sent     Future Appointments 7/10/2025 - 1/6/2026        Date Visit Type Length Department Provider     7/11/2025 10:00 AM PREVENTATIVE ADULT [facility] min [facility] Phuong Story MD    Location Instructions:     [facility] is located at [address] [place] [address], along [address]. Free parking is available; access the lot by turning north from [address] onto [facility] [address] [facility], then west onto [facility] [address].

## 2025-07-11 PROBLEM — M67.441 GANGLION CYST OF FINGER OF RIGHT HAND: Status: ACTIVE | Noted: 2025-07-11

## 2025-07-14 ENCOUNTER — PATIENT OUTREACH (OUTPATIENT)
Dept: CARE COORDINATION | Facility: CLINIC | Age: 70
End: 2025-07-14
Payer: COMMERCIAL

## 2025-07-16 ENCOUNTER — PATIENT OUTREACH (OUTPATIENT)
Dept: CARE COORDINATION | Facility: CLINIC | Age: 70
End: 2025-07-16
Payer: COMMERCIAL

## 2025-07-26 ENCOUNTER — HEALTH MAINTENANCE LETTER (OUTPATIENT)
Age: 70
End: 2025-07-26

## (undated) DEVICE — CATH FOLEY 16FR 5ML LUBRICATH LATEX 0165L16

## (undated) DEVICE — SU VICRYL 0 TIE 12X18" J906G

## (undated) DEVICE — GUIDEWIRE SENSOR DUAL FLEX STR 0.035"X150CM M0066703080

## (undated) DEVICE — DAVINCI S FORCEPS PK DISSECTING 420227

## (undated) DEVICE — DAVINCI S NDL DRIVER LARGE 420006

## (undated) DEVICE — SU DERMABOND MINI DHVM12

## (undated) DEVICE — GLOVE ESTEEM POWDER FREE SMT 7.5  2D72PT75

## (undated) DEVICE — PREP POVIDONE IODINE SOLUTION 10% 120ML

## (undated) DEVICE — SU PDS II 0 CT-2 27" Z334H

## (undated) DEVICE — GLOVE PROTEXIS MICRO 7.5  2D73PM75

## (undated) DEVICE — SU DERMABOND ADVANCED .7ML DNX12

## (undated) DEVICE — DAVINCI OBTURATOR 8MM BLADELESS 420023

## (undated) DEVICE — ESU CORD MONOPOLAR 10'  E0510

## (undated) DEVICE — SU WND CLOSURE VLOC 180 ABS 2-0 9" GS-22 VLOCL2145

## (undated) DEVICE — COVER FOOTSWITCH W/CINCH 20X24" 923267

## (undated) DEVICE — GLOVE PROTEXIS BLUE W/NEU-THERA 6.0  2D73EB60

## (undated) DEVICE — TUBING CONMED AIRSEAL SMOKE EVAC INSUFFLATION ASM-EVAC

## (undated) DEVICE — GLOVE PROTEXIS MICRO 7.0  2D73PM70

## (undated) DEVICE — Device

## (undated) DEVICE — DRAPE IOBAN INCISE 23X17" 6650EZ

## (undated) DEVICE — SU MONOCRYL 4-0 PS-2 18" UND Y496G

## (undated) DEVICE — SU MONOCRYL 2-0 SH 27" UND Y417H

## (undated) DEVICE — DECANTER BAG 2002S

## (undated) DEVICE — CATH URETERAL FLEX TIP TIGERTAIL 06FRX70CM 139006

## (undated) DEVICE — DAVINCI HOT SHEARS TIP COVER  400180

## (undated) DEVICE — TUBING IRRIG TUR Y TYPE 96" LF 6543-01

## (undated) DEVICE — SU PDS II 0 CTX 60" Z990G

## (undated) DEVICE — KIT PATIENT POSITIONING PIGAZZI LATEX FREE 40580

## (undated) DEVICE — SOL WATER IRRIG 3000ML BAG 2B7117

## (undated) DEVICE — SUCTION MANIFOLD NEPTUNE 2 SYS 4 PORT 0702-020-000

## (undated) DEVICE — SU VICRYL 0 UR-6 27" J603H

## (undated) DEVICE — ESU GROUND PAD ADULT W/CORD E7507

## (undated) DEVICE — PREP CHLORAPREP 26ML TINTED ORANGE  260815

## (undated) DEVICE — SUCTION IRR STRYKERFLOW II W/TIP 250-070-520

## (undated) DEVICE — PROTECTOR ARM ONE-STEP TRENDELENBURG 40418

## (undated) DEVICE — ESU HOLDER LAP INST DISP PURPLE LONG 330MM H-PRO-330

## (undated) DEVICE — DRSG KERLIX FLUFFS X5

## (undated) DEVICE — LINEN TOWEL PACK X10 5473

## (undated) DEVICE — LINEN ORTHO PACK 5446

## (undated) DEVICE — BLADE CLIPPER 3M 9670

## (undated) DEVICE — GLOVE PROTEXIS MICRO 8.0  2D73PM80

## (undated) DEVICE — SUCTION CANISTER MEDIVAC LINER 3000ML W/LID 65651-530

## (undated) DEVICE — DEVICE SUTURE GRASPER TROCAR CLOSURE 14GA PMITCSG

## (undated) DEVICE — DRAPE CV SPLIT TIBURON 87"X136.5" 9155

## (undated) DEVICE — ENDO POUCH GOLD 10MM ECATCH 173050G

## (undated) DEVICE — DAVINCI S CANNULA SEAL 8.5-13MM 420206

## (undated) DEVICE — CONNECTOR URETERAL CATH 14FR GOLDBERG 050049

## (undated) DEVICE — DAVINCI S GRASPER ENDOWRIST PROGRASP 420093

## (undated) DEVICE — DRAPE LEGGINGS 8421

## (undated) DEVICE — GOWN IMPERVIOUS SPECIALTY XLG/XLONG 32474

## (undated) DEVICE — SOL NACL 0.9% INJ 250ML BAG 2B1322Q

## (undated) DEVICE — SOL NACL 0.9% INJ 1000ML BAG 2B1324X

## (undated) DEVICE — BASIN SET MINOR DISP

## (undated) DEVICE — PACK DAVINCI GYN SMA15GDFS1

## (undated) DEVICE — PACK SET-UP STD 9102

## (undated) DEVICE — SYR 30ML LL W/O NDL 302832

## (undated) DEVICE — GOWN XLG DISP 9545

## (undated) DEVICE — NDL SPINAL 22GA 3.5" QUINCKE 405181

## (undated) DEVICE — SU SILK 2-0 TIE 24" SA75H

## (undated) DEVICE — NDL INSUFFLATION 14GA STEP S100000

## (undated) DEVICE — DAVINCI S MONOPOLAR SCISSORS PRECURVED HOT SHEARS 420179

## (undated) DEVICE — KIT ENDO TURNOVER/PROCEDURE W/CLEAN A SCOPE LINERS 103888

## (undated) DEVICE — CATH TRAY FOLEY 18FR DRAINAGE BAG STATLOCK 899918

## (undated) DEVICE — DAVINCI DRAPE KIT 4-ARM 420291

## (undated) DEVICE — PACK SLING CUSTOM RIDGES

## (undated) DEVICE — UTERINE MANIPULATOR RUMI TIP SACROCOLPOPEXY LG SACRO-1

## (undated) DEVICE — TUBING SUCTION 12"X1/4" N612

## (undated) DEVICE — MESH SLING Y SHAPE RESTORELLE 24X4CM 501420: Type: IMPLANTABLE DEVICE | Site: PELVIS | Status: NON-FUNCTIONAL

## (undated) DEVICE — SYR 03ML LL W/O NDL 309657

## (undated) DEVICE — ENDO TROCAR CONMED AIRSEAL BLADELESS 12X120MM IAS12-120LP

## (undated) RX ORDER — DEXAMETHASONE SODIUM PHOSPHATE 4 MG/ML
INJECTION, SOLUTION INTRA-ARTICULAR; INTRALESIONAL; INTRAMUSCULAR; INTRAVENOUS; SOFT TISSUE
Status: DISPENSED
Start: 2018-02-13

## (undated) RX ORDER — FENTANYL CITRATE 50 UG/ML
INJECTION, SOLUTION INTRAMUSCULAR; INTRAVENOUS
Status: DISPENSED
Start: 2018-02-13

## (undated) RX ORDER — BUPIVACAINE HYDROCHLORIDE AND EPINEPHRINE 5; 5 MG/ML; UG/ML
INJECTION, SOLUTION EPIDURAL; INTRACAUDAL; PERINEURAL
Status: DISPENSED
Start: 2018-02-13

## (undated) RX ORDER — BUPIVACAINE HYDROCHLORIDE 2.5 MG/ML
INJECTION, SOLUTION EPIDURAL; INFILTRATION; INTRACAUDAL
Status: DISPENSED
Start: 2018-02-13

## (undated) RX ORDER — EPHEDRINE SULFATE 50 MG/ML
INJECTION, SOLUTION INTRAMUSCULAR; INTRAVENOUS; SUBCUTANEOUS
Status: DISPENSED
Start: 2018-02-13

## (undated) RX ORDER — PHENYLEPHRINE HCL IN 0.9% NACL 1 MG/10 ML
SYRINGE (ML) INTRAVENOUS
Status: DISPENSED
Start: 2018-02-13

## (undated) RX ORDER — LIDOCAINE HYDROCHLORIDE 10 MG/ML
INJECTION, SOLUTION EPIDURAL; INFILTRATION; INTRACAUDAL; PERINEURAL
Status: DISPENSED
Start: 2018-02-13

## (undated) RX ORDER — SCOLOPAMINE TRANSDERMAL SYSTEM 1 MG/1
PATCH, EXTENDED RELEASE TRANSDERMAL
Status: DISPENSED
Start: 2018-02-13

## (undated) RX ORDER — PROPOFOL 10 MG/ML
INJECTION, EMULSION INTRAVENOUS
Status: DISPENSED
Start: 2018-02-13

## (undated) RX ORDER — FENTANYL CITRATE 50 UG/ML
INJECTION, SOLUTION INTRAMUSCULAR; INTRAVENOUS
Status: DISPENSED
Start: 2019-08-02

## (undated) RX ORDER — KETOROLAC TROMETHAMINE 30 MG/ML
INJECTION, SOLUTION INTRAMUSCULAR; INTRAVENOUS
Status: DISPENSED
Start: 2018-02-13

## (undated) RX ORDER — NEOSTIGMINE METHYLSULFATE 1 MG/ML
VIAL (ML) INJECTION
Status: DISPENSED
Start: 2018-02-13

## (undated) RX ORDER — FUROSEMIDE 10 MG/ML
INJECTION INTRAMUSCULAR; INTRAVENOUS
Status: DISPENSED
Start: 2018-02-13

## (undated) RX ORDER — SIMETHICONE 40MG/0.6ML
SUSPENSION, DROPS(FINAL DOSAGE FORM)(ML) ORAL
Status: DISPENSED
Start: 2019-08-02

## (undated) RX ORDER — PHENAZOPYRIDINE HYDROCHLORIDE 200 MG/1
TABLET, FILM COATED ORAL
Status: DISPENSED
Start: 2018-02-13

## (undated) RX ORDER — GLYCOPYRROLATE 0.2 MG/ML
INJECTION INTRAMUSCULAR; INTRAVENOUS
Status: DISPENSED
Start: 2018-02-13

## (undated) RX ORDER — ONDANSETRON 2 MG/ML
INJECTION INTRAMUSCULAR; INTRAVENOUS
Status: DISPENSED
Start: 2018-02-13

## (undated) RX ORDER — CLINDAMYCIN PHOSPHATE 900 MG/50ML
INJECTION, SOLUTION INTRAVENOUS
Status: DISPENSED
Start: 2018-02-13

## (undated) RX ORDER — BUPIVACAINE HYDROCHLORIDE AND EPINEPHRINE 5; 5 MG/ML; UG/ML
INJECTION, SOLUTION PERINEURAL
Status: DISPENSED
Start: 2018-02-13